# Patient Record
Sex: MALE | Race: WHITE
[De-identification: names, ages, dates, MRNs, and addresses within clinical notes are randomized per-mention and may not be internally consistent; named-entity substitution may affect disease eponyms.]

---

## 2017-04-30 ENCOUNTER — HOSPITAL ENCOUNTER (INPATIENT)
Dept: HOSPITAL 25 - ED | Age: 67
LOS: 4 days | Discharge: NURSING, REHAB OR LONG-TERM CARE | DRG: 65 | End: 2017-05-04
Attending: HOSPITALIST | Admitting: HOSPITALIST
Payer: MEDICARE

## 2017-04-30 DIAGNOSIS — G81.91: ICD-10-CM

## 2017-04-30 DIAGNOSIS — R33.8: ICD-10-CM

## 2017-04-30 DIAGNOSIS — Z79.01: ICD-10-CM

## 2017-04-30 DIAGNOSIS — J90: ICD-10-CM

## 2017-04-30 DIAGNOSIS — Z86.73: ICD-10-CM

## 2017-04-30 DIAGNOSIS — Z79.82: ICD-10-CM

## 2017-04-30 DIAGNOSIS — I65.23: ICD-10-CM

## 2017-04-30 DIAGNOSIS — N18.9: ICD-10-CM

## 2017-04-30 DIAGNOSIS — I50.42: ICD-10-CM

## 2017-04-30 DIAGNOSIS — Z86.14: ICD-10-CM

## 2017-04-30 DIAGNOSIS — Z80.9: ICD-10-CM

## 2017-04-30 DIAGNOSIS — I27.2: ICD-10-CM

## 2017-04-30 DIAGNOSIS — R20.0: ICD-10-CM

## 2017-04-30 DIAGNOSIS — J98.11: ICD-10-CM

## 2017-04-30 DIAGNOSIS — T45.515A: ICD-10-CM

## 2017-04-30 DIAGNOSIS — I48.2: ICD-10-CM

## 2017-04-30 DIAGNOSIS — Z95.810: ICD-10-CM

## 2017-04-30 DIAGNOSIS — N40.1: ICD-10-CM

## 2017-04-30 DIAGNOSIS — I34.0: ICD-10-CM

## 2017-04-30 DIAGNOSIS — N13.4: ICD-10-CM

## 2017-04-30 DIAGNOSIS — N39.0: ICD-10-CM

## 2017-04-30 DIAGNOSIS — R29.704: ICD-10-CM

## 2017-04-30 DIAGNOSIS — R29.810: ICD-10-CM

## 2017-04-30 DIAGNOSIS — R47.81: ICD-10-CM

## 2017-04-30 DIAGNOSIS — N26.1: ICD-10-CM

## 2017-04-30 DIAGNOSIS — N13.30: ICD-10-CM

## 2017-04-30 DIAGNOSIS — I13.0: ICD-10-CM

## 2017-04-30 DIAGNOSIS — K57.90: ICD-10-CM

## 2017-04-30 DIAGNOSIS — B96.1: ICD-10-CM

## 2017-04-30 DIAGNOSIS — I63.9: Primary | ICD-10-CM

## 2017-04-30 DIAGNOSIS — G47.33: ICD-10-CM

## 2017-04-30 DIAGNOSIS — R79.1: ICD-10-CM

## 2017-04-30 DIAGNOSIS — Z91.14: ICD-10-CM

## 2017-04-30 DIAGNOSIS — Z87.891: ICD-10-CM

## 2017-04-30 DIAGNOSIS — E66.2: ICD-10-CM

## 2017-04-30 DIAGNOSIS — B96.4: ICD-10-CM

## 2017-04-30 DIAGNOSIS — D63.1: ICD-10-CM

## 2017-04-30 DIAGNOSIS — M19.90: ICD-10-CM

## 2017-04-30 LAB
ALBUMIN SERPL BCG-MCNC: 3.3 G/DL (ref 3.2–5.2)
ALP SERPL-CCNC: 130 U/L (ref 34–104)
ALT SERPL W P-5'-P-CCNC: 22 U/L (ref 7–52)
ANION GAP SERPL CALC-SCNC: 7 MMOL/L (ref 2–11)
AST SERPL-CCNC: 16 U/L (ref 13–39)
BUN SERPL-MCNC: 86 MG/DL (ref 6–24)
BUN/CREAT SERPL: 15.3 (ref 8–20)
CALCIUM SERPL-MCNC: 8.6 MG/DL (ref 8.6–10.3)
CHLORIDE SERPL-SCNC: 105 MMOL/L (ref 101–111)
CHOLEST SERPL-MCNC: 118 MG/DL
GLOBULIN SER CALC-MCNC: 3.8 G/DL (ref 2–4)
GLUCOSE SERPL-MCNC: 84 MG/DL (ref 70–100)
HCO3 SERPL-SCNC: 27 MMOL/L (ref 22–32)
HCT VFR BLD AUTO: 36 % (ref 42–52)
HDLC SERPL-MCNC: 31.5 MG/DL
HGB BLD-MCNC: 11.5 G/DL (ref 14–18)
MCH RBC QN AUTO: 27 PG (ref 27–31)
MCHC RBC AUTO-ENTMCNC: 32 G/DL (ref 31–36)
MCV RBC AUTO: 85 FL (ref 80–94)
POTASSIUM SERPL-SCNC: 4.8 MMOL/L (ref 3.5–5)
PROT SERPL-MCNC: 7.1 G/DL (ref 6.4–8.9)
RBC # BLD AUTO: 4.23 10^6/UL (ref 4–5.4)
SODIUM SERPL-SCNC: 139 MMOL/L (ref 133–145)
TRIGL SERPL-MCNC: 36 MG/DL
TROPONIN I SERPL-MCNC: 0.01 NG/ML (ref ?–0.04)
WBC # BLD AUTO: 6.7 10^3/UL (ref 3.5–10.8)

## 2017-04-30 PROCEDURE — 81015 MICROSCOPIC EXAM OF URINE: CPT

## 2017-04-30 PROCEDURE — 74176 CT ABD & PELVIS W/O CONTRAST: CPT

## 2017-04-30 PROCEDURE — 93005 ELECTROCARDIOGRAM TRACING: CPT

## 2017-04-30 PROCEDURE — 80048 BASIC METABOLIC PNL TOTAL CA: CPT

## 2017-04-30 PROCEDURE — 85025 COMPLETE CBC W/AUTO DIFF WBC: CPT

## 2017-04-30 PROCEDURE — 85014 HEMATOCRIT: CPT

## 2017-04-30 PROCEDURE — 94660 CPAP INITIATION&MGMT: CPT

## 2017-04-30 PROCEDURE — 84484 ASSAY OF TROPONIN QUANT: CPT

## 2017-04-30 PROCEDURE — 83605 ASSAY OF LACTIC ACID: CPT

## 2017-04-30 PROCEDURE — 85018 HEMOGLOBIN: CPT

## 2017-04-30 PROCEDURE — 86900 BLOOD TYPING SEROLOGIC ABO: CPT

## 2017-04-30 PROCEDURE — 81003 URINALYSIS AUTO W/O SCOPE: CPT

## 2017-04-30 PROCEDURE — 36415 COLL VENOUS BLD VENIPUNCTURE: CPT

## 2017-04-30 PROCEDURE — 86901 BLOOD TYPING SEROLOGIC RH(D): CPT

## 2017-04-30 PROCEDURE — 86850 RBC ANTIBODY SCREEN: CPT

## 2017-04-30 PROCEDURE — 86803 HEPATITIS C AB TEST: CPT

## 2017-04-30 PROCEDURE — 93880 EXTRACRANIAL BILAT STUDY: CPT

## 2017-04-30 PROCEDURE — 71010: CPT

## 2017-04-30 PROCEDURE — 80061 LIPID PANEL: CPT

## 2017-04-30 PROCEDURE — 87186 SC STD MICRODIL/AGAR DIL: CPT

## 2017-04-30 PROCEDURE — 85730 THROMBOPLASTIN TIME PARTIAL: CPT

## 2017-04-30 PROCEDURE — 87641 MR-STAPH DNA AMP PROBE: CPT

## 2017-04-30 PROCEDURE — C8929 TTE W OR WO FOL WCON,DOPPLER: HCPCS

## 2017-04-30 PROCEDURE — 76770 US EXAM ABDO BACK WALL COMP: CPT

## 2017-04-30 PROCEDURE — 70450 CT HEAD/BRAIN W/O DYE: CPT

## 2017-04-30 PROCEDURE — 87086 URINE CULTURE/COLONY COUNT: CPT

## 2017-04-30 PROCEDURE — 85049 AUTOMATED PLATELET COUNT: CPT

## 2017-04-30 PROCEDURE — 85610 PROTHROMBIN TIME: CPT

## 2017-04-30 PROCEDURE — 80053 COMPREHEN METABOLIC PANEL: CPT

## 2017-04-30 PROCEDURE — 82570 ASSAY OF URINE CREATININE: CPT

## 2017-04-30 PROCEDURE — 84300 ASSAY OF URINE SODIUM: CPT

## 2017-04-30 PROCEDURE — 93306 TTE W/DOPPLER COMPLETE: CPT

## 2017-04-30 PROCEDURE — G0378 HOSPITAL OBSERVATION PER HR: HCPCS

## 2017-04-30 PROCEDURE — 94760 N-INVAS EAR/PLS OXIMETRY 1: CPT

## 2017-04-30 PROCEDURE — 94640 AIRWAY INHALATION TREATMENT: CPT

## 2017-04-30 PROCEDURE — 87077 CULTURE AEROBIC IDENTIFY: CPT

## 2017-04-30 RX ADMIN — ASPIRIN SCH MG: 81 TABLET, COATED ORAL at 15:56

## 2017-04-30 RX ADMIN — MOMETASONE FUROATE AND FORMOTEROL FUMARATE DIHYDRATE SCH PUFF: 200; 5 AEROSOL RESPIRATORY (INHALATION) at 20:24

## 2017-04-30 RX ADMIN — IPRATROPIUM BROMIDE AND ALBUTEROL SULFATE SCH NEB: .5; 3 SOLUTION RESPIRATORY (INHALATION) at 20:21

## 2017-04-30 RX ADMIN — TAMSULOSIN HYDROCHLORIDE SCH MG: 0.4 CAPSULE ORAL at 22:41

## 2017-04-30 NOTE — RAD
Indication: CVA. History of cardiac and respiratory disease. Morbid obesity.



Comparison: November 17, 2016 chest radiograph and July 22, 2016 CT.



Technique: Semiupright AP chest 0912 hours



Report: Leftward rotation. Cardiomegaly. Moderate LEFT pleural effusion and significant

atelectasis of the LEFT lung similar to the prior exams. Moderate prominence of

interstitial markings in the RIGHT lung without change. No pneumothorax evident. Prominent

ill-defined central pulmonary vasculature.



IMPRESSION: Interstitial pulmonary edema not excluded. Chronic LEFT pleural effusion and

significant atelectasis of the LEFT lung.

## 2017-04-30 NOTE — RAD
Indication: Acute RIGHT hemiplegia.



Comparison: November 18, 2016 CT



Technique: Noncontrast CT vertex of skull through foramen magnum.



Report: Obliquity and motion artifact degrades image quality. Mild prominence of the

cerebral sulci reflecting atrophy. Unremarkable ventricles and basal cisterns. Negative

for gray matter white matter obscuration, intra or extra-axial hemorrhage, or mass effect.

Unremarkable orbital contents. No suspicious calvarial or skull base lesion. 



Mucous retention cyst or polyp at the floor of the LEFT maxillary sinus. Negative for

paranasal sinus fluid levels. Partial opacification of the mastoid air spaces. Cerumen in

the external auditory canals. Negative for scalp hematoma.



IMPRESSION: No evidence for intracranial hemorrhage or gross stigmata of ischemic infarct.

Mild involutional change.



Results discussed with Dr. Maurice 4/30/2017 9:32 AM EDT

## 2017-04-30 NOTE — RAD
INDICATION: RIGHT flank pain, hematuria. Morbid obesity.



COMPARISON:  October 24, 2013 CT.



TECHNIQUE: Multidetector CT images were obtained from the lung bases to the ischial

tuberosities. Evaluation of the viscera is limited without IV contrast. Multiplanar

reformation.



REPORT: Morbid obesity limits image quality. Cardiomegaly with associated partial

atelectasis of the LEFT lung without change.



Small calcified granuloma at the unenhanced liver. The gallbladder is not visualized.

Moderately severe fatty replacement of the pancreas. Unremarkable spleen.



No CT abnormality of the upper GI or small bowel. Unremarkable infra cecal appendix. Mild

colonic diverticulosis without findings of diverticulitis. Negative for ascites or free

air. Laxity of the ventral abdominal wall. No discrete hernia evident.



Normal adrenal glands. Postsurgical change of probable prostatectomy. Mildly prominent

urinary bladder wall and surrounding fat reticulation/inflammation. Severe LEFT and

moderate RIGHT hydroureter. Moderate RIGHT and moderately severe LEFT renal cortical

atrophy. Negative for dilatation of the calyces. No obstructing ureteral stones. A few

punctate nonobstructing LEFT renal stones. Coarse calcification at the normal volume

prostate. Unremarkable seminal vesicles.



Negative for lymphadenopathy. Mild peripheral atherosclerotic plaque without aneurysm of

the abdominal aorta or iliac arteries. Physiologic distention of the IVC.



No significant change in ring and arc pattern matrix calcification lesion at the RIGHT

femoral neck to intratrochanteric region compared with the October 24, 2013 CT consistent

with a benign enchondroma. Negative for suspicious focal osseous lesions. Negative for

fracture. Lumbar sacral spine degenerative spondylosis and facet joint osteoarthritis.



IMPRESSION: 

1.Cardiomegaly with associated partial atelectasis of the LEFT lung without change.

2. Mild colonic diverticulosis without findings of diverticulitis. Normal appendix

documented.

3. Mild bladder wall thickening and reticulation of the fat surrounding the urinary

bladder; correlate for potential cystitis. And bilateral hydroureter without dilatation of

the renal calyces. Moderate RIGHT and moderately severe LEFT renal cortical atrophy.

## 2017-04-30 NOTE — HP
HISTORY AND PHYSICAL:

 

DATE OF ADMISSION:  04/30/17

 

CHIEF COMPLAINT:  Right-sided weakness.

 

HISTORY OF PRESENT ILLNESS:  The patient is a 67-year-old gentleman who resides 
at [facility], who said last night his whole right side 
felt swollen.  Then, this morning when he woke up at 7:30, he could not lift up 
his right leg or his right arm.  He thinks he had some slurred speech as well.  
He denied any facial droop, but he said he had some trouble finding words.  
Currently, he said the symptoms have resolved.  He had no associated headache 
or blurry vision.  He does ambulate with a walker, but says he did not notice 
any difference in ambulation.  He had no chest pain, no shortness of breath, 
and no palpitations. He said he noticed a week ago, he has an episode where he 
felt like his right arm was superglued to his chest.  He also notes that his 
blood pressure normally runs low, but today it was 148/70, which is actually 
quite high for him.  He had a workup for slurred speech back this November in 
2016.

 

PAST MEDICAL HISTORY:  Significant for obstructive sleep apnea with 
noncompliance, cerebrovascular accident, atrial fibrillation, hypertension, MRSA
, BPH, morbid obesity, history of congestive heart failure.

 

PAST SURGICAL HISTORY:  Significant for defibrillator/pacemaker placement and 
hernia repair.

 

CURRENT MEDICATIONS:  Are as follows:

1.  Dulera 200/5 two puffs inhaled twice daily.

2.  Furosemide 40 mg twice daily.

3.  Ventolin nebulizer 2.5 mg inhaled 3 times a day.

4.  Atrovent nebulizer 0.5 mg 3 times a day.

5.  Cholecalciferol 2000 units daily.

6.  Flomax 0.4 mg at bedtime.

7.  Coumadin 3.5 mg daily.

8.  Tylenol 650 mg every 6 hours as needed.

 

ALLERGIES:  He has no known drug allergies.

 

FAMILY HISTORY:  Mother and father had colon cancer.  Father had lung cancer.

 

SOCIAL HISTORY:  Ex-tobacco, quit _____.  Surrogate decision maker is his 
brother, Nicolas Culver.

 

REVIEW OF SYSTEMS:  A 14-point review of systems was completed with the 
patient. All pertinent positives and negatives are in the history of present 
illness, otherwise it is negative.

 

                               PHYSICAL EXAMINATION

 

GENERAL:  Pleasant gentleman, lying in bed, in no acute distress.

 

VITAL SIGNS:  Temperature 97.9 degrees, heart rate 70 beats per minute, 
respiratory rate 20 breaths per minute, pulse ox 97%, blood pressure 125/45.

 

HEENT:  Normocephalic, atraumatic.  Pupils are equal, round, and reactive to 
light. Moist mucous membranes.

 

NECK:  Supple.  No JVD, bruits, palpable thyroid, or lymphadenopathy.

 

CHEST:  Clear to auscultation and percussion bilaterally.

 

CARDIOVASCULAR:  S1, S2 appreciated.

 

ABDOMEN:  Morbidly obese.  Positive bowel sounds in all 4 quadrants except some 
erythema on his left side of his abdomen, but no warmth or tenderness.

 

EXTREMITIES:  No cyanosis or clubbing.  He has got bilateral edema.

 

NEURO:  Alert and oriented x3.  Moves all extremities.

 

SKIN:  Other than the aforementioned erythema, no other distinct rashes or 
abnormalities.

 

 DIAGNOSTIC STUDIES/LAB DATA:  White count 6.7, hemoglobin 11.5, hematocrit 36, 
his platelets are 223.  Sodium is 139, potassium 4.8, chloride 105, CO2 27, BUN 
86, creatinine 5.61, glucose 84.  His INR is 3.26.  Urinalysis has +1 leukocyte 
esterase.

 

Brain CT was interpreted by Radiology as no evidence for intracranial 
hemorrhage or gross stigmata of ischemic infarct, mild involutional change.

 

Chest x-ray was interpreted by Radiology as interstitial pulmonary edema not 
excluded.  Chronic left pleural effusion and significant atelectasis of the 
left lung.

 

Abdominal pelvic CT:  Cardiomegaly with associated partial atelectasis of the 
left lung without change.  Mild colonic diverticulosis without findings of 
diverticulitis.  Normal appendix documented.  Mild bladder wall thickening and 
reticulation of the fat surrounding the urinary bladder _____ cystitis and 
bilateral hydroureter without dilatation of the renal calyces.  Moderate right 
and severe left renal cortical atrophy.

 

EKG shows V-paced rhythm.

 

ASSESSMENT AND PLAN:

1.  Transient ischemic attack.  It is possible from his symptoms.  I did notice 
on the neurologic exam, he may have a slight pronator drift, but otherwise was 
intact. I will order an MRI of his head and neck and a transthoracic 
echocardiogram with bubble study and do neurological checks q.4 hours.  He is 
already on Coumadin and he is actually supratherapeutic, but I will add an 
aspirin a day to his regimen.  I will check a lipid profile in the morning.  I 
will hold off on a neurological evaluation unless he rules out for a 
cerebrovascular accident.

2.  Acute kidney injury.  Creatinine is normally elevated, but this is 
unusually high.  He seems to be dehydrated.  We will hold his furosemide and 
place him on IV fluids and recheck in the a.m.

3.  Atrial fibrillation.  Hold Coumadin with an elevated INR.  He has a 
pacemaker and his heart rate is adequately controlled.

4.  Chronic obstructive pulmonary disease.  Continue inhaler.

5.  Hydroureter, etc., on CT.  The patient should follow up with Urology.  Does 
not need to be seen by them through this admission.

6.  Benign prostatic hyperplasia.  Continue Flomax.

7.  FEN.  N.p.o. awaiting swallow evaluation.

8.  DVT prophylaxis.  He is on Coumadin.

9.  The patient is a full code.

 

TIME SPENT:  Over 80 minutes was spent on this H and P, more than 45 minutes of 
which was spent in direct face-to-face contact with the patient in evaluation, 
physical exam, counseling, and coordination of care.

 

CC:  Dr. Cherry* 

 

[id number]/CPS #: [id number]

NICOLAS

## 2017-05-01 LAB
ANION GAP SERPL CALC-SCNC: 9 MMOL/L (ref 2–11)
BUN SERPL-MCNC: 88 MG/DL (ref 6–24)
BUN/CREAT SERPL: 15.4 (ref 8–20)
CALCIUM SERPL-MCNC: 8.3 MG/DL (ref 8.6–10.3)
CHLORIDE SERPL-SCNC: 106 MMOL/L (ref 101–111)
CHOLEST SERPL-MCNC: 98 MG/DL
GLUCOSE SERPL-MCNC: 96 MG/DL (ref 70–100)
HCO3 SERPL-SCNC: 25 MMOL/L (ref 22–32)
HDLC SERPL-MCNC: 24.5 MG/DL
POTASSIUM SERPL-SCNC: 4.3 MMOL/L (ref 3.5–5)
SODIUM SERPL-SCNC: 140 MMOL/L (ref 133–145)
TRIGL SERPL-MCNC: 51 MG/DL

## 2017-05-01 PROCEDURE — 5A09357 ASSISTANCE WITH RESPIRATORY VENTILATION, LESS THAN 24 CONSECUTIVE HOURS, CONTINUOUS POSITIVE AIRWAY PRESSURE: ICD-10-PCS | Performed by: HOSPITALIST

## 2017-05-01 RX ADMIN — IPRATROPIUM BROMIDE AND ALBUTEROL SULFATE SCH NEB: .5; 3 SOLUTION RESPIRATORY (INHALATION) at 20:12

## 2017-05-01 RX ADMIN — SODIUM CHLORIDE SCH MLS/HR: 900 IRRIGANT IRRIGATION at 21:11

## 2017-05-01 RX ADMIN — IPRATROPIUM BROMIDE AND ALBUTEROL SULFATE SCH NEB: .5; 3 SOLUTION RESPIRATORY (INHALATION) at 08:59

## 2017-05-01 RX ADMIN — ASPIRIN SCH MG: 81 TABLET, COATED ORAL at 08:53

## 2017-05-01 RX ADMIN — MOMETASONE FUROATE AND FORMOTEROL FUMARATE DIHYDRATE SCH: 200; 5 AEROSOL RESPIRATORY (INHALATION) at 20:17

## 2017-05-01 RX ADMIN — TAMSULOSIN HYDROCHLORIDE SCH MG: 0.4 CAPSULE ORAL at 21:08

## 2017-05-01 RX ADMIN — MOMETASONE FUROATE AND FORMOTEROL FUMARATE DIHYDRATE SCH PUFF: 200; 5 AEROSOL RESPIRATORY (INHALATION) at 08:59

## 2017-05-01 RX ADMIN — IPRATROPIUM BROMIDE AND ALBUTEROL SULFATE SCH NEB: .5; 3 SOLUTION RESPIRATORY (INHALATION) at 13:20

## 2017-05-01 RX ADMIN — Medication SCH UNITS: at 08:52

## 2017-05-01 RX ADMIN — SODIUM CHLORIDE SCH MLS/HR: 900 IRRIGANT IRRIGATION at 11:09

## 2017-05-01 NOTE — RAD
HISTORY: Stroke workup



COMPARISONS: November 18, 2016



The study is limited by patient body habitus.





TECHNIQUE: Multiple transverse and longitudinal ultrasound images were obtained of the

carotid and vertebral arteries bilaterally, using grayscale, color Doppler, and spectral

Doppler imaging.



FINDINGS:



Measurement of carotid stenosis is based on flow velocity parameters that correlate the

residual internal carotid artery diameter with [employer] Symptomatic Carotid

Endarterectomy Trial (NASCET)-based stenosis levels.



RIGHT:

Intima: There is diffuse intimal thickening.



Velocities:

Right internal carotid artery maximum peak systolic velocity: 148 cm/s

Right common carotid artery maximum peak systolic velocity: 138 cm/s

Right internal carotid artery/common carotid artery ratio: 1.6



Waveforms: There is no spectral broadening.



Right Vertebral: The right vertebral artery flow is antegrade.



LEFT:

Intima: There is diffuse intimal thickening.



Velocities:

Left internal carotid artery maximum peak systolic velocity: 139 cm/s

Left common carotid artery maximum peak systolic velocity: 161 cm/s

Left internal carotid artery/common carotid artery ratio: 1



Waveforms: There is no spectral broadening.



Left Vertebral: The left vertebral artery flow is antegrade.



OTHER FINDINGS: None.



IMPRESSION: 

1.  LIMITED STUDY.

2.  MILDLY ELEVATED PEAK SYSTOLIC VELOCITIES OF THE INTERNAL CAROTID ARTERIES BILATERALLY,

CONSISTENT WITH BILATERAL INTERNAL CAROTID ARTERY STENOSIS BETWEEN 50% AND 69% BY NASCET

CRITERIA.

3.  GIVEN THE LIMITATIONS OF STUDY, AND THE FINDINGS OF BILATERAL INTERNAL CAROTID ARTERY

STENOSIS, CONSIDER CORRELATION WITH CROSS SECTIONAL IMAGING, INCLUDING CT ANGIOGRAPHY OR

MR ANGIOGRAPHY OF THE NECK



CPT II Codes: 3100F

## 2017-05-01 NOTE — PN
Subjective


Date of Service: 05/01/17


Interval History: 





Patient seen this morning. Says he has had recurrent symptoms over the past 

week or two. Says he previously had RUE weakness and could not move his arm for 

1-2 minutes, this occurred 1.5 weeks ago. His current symptoms began two nights 

ago, feels that he is not back to baseline, still feels like he is having 

persistent weakness, worse than his baseline from prior CVA. Reports non-

compliance with his nightly BiPAP, was not wearing it the night his symptoms 

began


Family History: Unchanged from Admission


Social History: Unchanged from Admission


Past Medical History: Unchanged from Admission





Objective


Active Medications: 








Albuterol/Ipratropium (Duoneb (Albuterol 2.5 Mg/Ipratropium 0.5 Mg))  1 neb INH 

TID TISHA


Aspirin (Aspirin Ec Low Dose*)  81 mg PO DAILY TISHA


Cholecalciferol (Vitamin D Tab*)  2,000 units PO DAILY TISHA


Sodium Chloride (Ns 0.9% 1000 Ml*)  1,000 mls @ 100 mls/hr IV PER RATE TISHA


Mometasone Furoate/Formoterol Fumar (Dulera 200/5 Mdi*)  2 puff INH BID TISHA


Morphine Sulfate (Morphine Inj (Syringe)*)  4 mg IV Q4H PRN


Pharmacy Profile Note (Coumadin Daily Reminder*)  0 note FOLLOW UP 1700 TISHA


Tamsulosin HCl (Flomax Cap*)  0.4 mg PO BEDTIME TISHA





 Vital Signs











  04/30/17 04/30/17 04/30/17





  11:30 13:14 16:11


 


Temperature 97.9 F 97.5 F 97.7 F


 


Pulse Rate 70 68 69


 


Respiratory 20 26 26





Rate   


 


Blood Pressure 125/45 131/62 122/55





(mmHg)   


 


O2 Sat by Pulse 97 93 95





Oximetry   














  04/30/17 04/30/17 05/01/17





  19:33 20:25 00:20


 


Temperature 98.6 F  97.8 F


 


Pulse Rate 71 72 72


 


Respiratory 22  20





Rate   


 


Blood Pressure 141/64  114/56





(mmHg)   


 


O2 Sat by Pulse 91 98 92





Oximetry   














  05/01/17 05/01/17 05/01/17





  04:28 07:48 08:00


 


Temperature 97.4 F 97.5 F 


 


Pulse Rate 70 70 


 


Respiratory 28 24 





Rate   


 


Blood Pressure 104/49 116/53 





(mmHg)   


 


O2 Sat by Pulse 96 90 90





Oximetry   














  05/01/17





  09:34


 


Temperature 


 


Pulse Rate 79


 


Respiratory 18





Rate 


 


Blood Pressure 





(mmHg) 


 


O2 Sat by Pulse 98





Oximetry 











Oxygen Devices in Use Now: Nasal Cannula


Appearance: Super-morbidly obese,  M, laying in bed in NAD


Eyes: No Scleral Icterus


Ears/Nose/Mouth/Throat: Mucous Membranes Moist


Neck: NL Appearance and Movements; NL JVP


Respiratory: Symmetrical Chest Expansion and Respiratory Effort, Clear to 

Auscultation


Cardiovascular: NL Sounds; No Murmurs; No JVD, - - IRIR


Abdominal: - - Obese, soft, NTND, BS+


Lymphatic: No Cervical Adenopathy


Extremities: No Edema


Skin: No Rash or Ulcers


Neurological: Alert and Oriented x 3, - - 4/5 strength in RLE, 4+/5 strength in 

RUE, diminished sensation on R side 


Result Diagrams: 


 04/30/17 09:20





 05/01/17 05:34


Microbiology and Other Data: 


 Microbiology











 04/30/17 13:00 Nasal Screen MRSA (PCR)([facility]) - Final





 Nasal    Mrsa Negative














Assess/Plan/Problems-Billing


Assessment: 


R sided weakness, SHAWNA on CKD, hydroureter in a 68 yo M with hx of CVA, AFib on 

coumadin, KIM, BPH, chronic systolic CHF (EF 35-40%)








- Patient Problems


(1) Weakness


Current Visit: Yes   Comment: R sided weakness. ?TIA/CVA. Has had these 

symptoms in the past with other medical problems, ?related to renal failure. 

MRI unable to be done, CTA contraindicated with renal failure. Will get carotid 

US. Echo pending. Continue ASA for now. LDL at goal. Coumadin supratherapeutic. 

Will ask Neurology to evaluate.    





(2) SHAWNA (acute kidney injury)


Current Visit: Yes   Comment: on CKD. BUN/Creatinine elevated, ?due to 

malfunctioning alexandre which has been replaced. Hydroureter noted on CT scan. 

Patient did not get IVF overnight, will start now and monitor renal function. 

Get renal US. Continue flomax for BPH. Holding Lasix.   





(3) Atrial fibrillation


Current Visit: No   Comment: 


Heart rate adequately controlled.  


Chronic AF -- previous cardioembolic stroke -- 11/15/2016


Coumadin is supratherapeutic, holding for now. Monitor INR


   





(4) KIM (obstructive sleep apnea)


Current Visit: No   Comment: BiPAP 16/8 when asleep, needs to be more compliant 

at [facility] with this therapy   





(5) DVT prophylaxis


Current Visit: No   Comment: On coumadin

## 2017-05-01 NOTE — RAD
INDICATION: Hydroureter, follow-up.



COMPARISON:  Comparison is made with a prior CT of the abdomen and pelvis from April 30, 2017.



TECHNIQUE:  Multiple real-time images of the kidneys and urinary bladder were obtained.

The exam is limited due to the patient's body habitus.



FINDINGS: The left kidney was not visualized. The right kidney measured 10.8 x 5.4 x 5.6

cm. No focal abnormality or hydronephrosis is seen.



The urinary bladder was not visualized. 



IMPRESSION:  LIMITED STUDY, THE LEFT KIDNEY AND URINARY BLADDER WERE NOT VISUALIZED. THE

RIGHT KIDNEY APPEARS TO BE WITHIN NORMAL LIMITS.

## 2017-05-01 NOTE — CONS
NEUROLOGY CONSULTATION:

 

DATE OF CONSULTATION:  05/01/17

 

LOCATION:  He is an inpatient in room 438.

 

REFERRING PHYSICIAN:  Dr. Ottoniel Coy.

 

CHIEF COMPLAINT:  Right-sided weakness.

 

HISTORY OF PRESENT ILLNESS:  Puneet Culver is a 67-year-old right-handed man who 
presented the day of admission with recurrent right-sided weakness.  He says 
the night before, he had some weakness in his right side and then the next 
morning when he woke up, he could not raise his right arm and leg.  He has had 
at least 2 if not multiple episodes of transient right-sided weakness, which 
would last days to a week.  He has had negative CAT scans including the last 
night when he came in.  He does not have any difficulty with language, but 
feels his right arm and leg are weak and numb.  He does not notice any change 
in his speech.  He is on chronic anticoagulation for chronic atrial 
fibrillation and also has a pacemaker and defibrillator.  His INR at 
presentation yesterday was 3.26.  He has a history of hypertension, coronary 
artery disease, and severe obesity alveolar hypoventilation syndrome.

 

PAST MEDICAL HISTORY:  Notable for hospitalization for over a month in [place]
, what sounds like aspiration pneumonia after surgery for defibrillator and 
pacer, sleep apnea with noncompliance, morbid obesity, history of congestive 
heart failure.

 

MEDICATIONS:  At [facility] where he was when he came in are:

1.  Furosemide 40 mg p.o. b.i.d.

2.  Dulera 2 puffs b.i.d.

3.  Ventolin nebulizer 2.5 mg t.i.d.

4.  Atrovent nebulizer 0.5 mg t.i.d.

5.  Flomax 0.4 mg p.o. daily.

6.  Coumadin 3.5 mg p.o. daily.

 

ALLERGIES:  He does not have any drug allergies.

 

REVIEW OF SYSTEMS:  Notable for severe leg and back pain limiting mobility.  He 
says he uses a walker and can walk about the length of his hospital room and 
after that has to use a wheelchair.  This apparently is chronic.  He denies 
headaches. He had an episode of double vision late last year.  He was having 
chest pain last year doing physical therapy, but nothing recently.  No recent 
faints.  No history of seizures.  In spite of his obesity, there is no history 
of diabetes.  No recent unusual stressors.  He denies abdominal problems.

 

PHYSICAL EXAMINATION:  He is morbidly obese.  Temperature 98.6 temporarily, 
blood pressure 127/72, heart rate 70s and regular, respiratory rate 24.  Oxygen 
saturation is 98% on 4 L per nasal canula.  Lung reveals diffuse wheezes.  Head 
is atraumatic.  Heart tones are very distant.  I do not hear murmurs.  The 
carotid pulses are not felt either, but I do not hear any bruits over loud 
breath sound. Oral mucosa is moist, dentition is poor, there is no oral trauma.

 

Neurologically, pupils react equally from 3.5 to 2 mm.  Funduscopic exam 
reveals sharp discs and silver wiring.  Eye movements and visual fields are 
normal.  There is no ptosis.  Facial musculature is symmetric.  Facial 
sensation to pin and light touch is reported as diminished on the right side.  
Speech is mildly dysarthric. Hearing is intact.

 

Motor exam reveals fairly good strength on the left arm, grade 4 left hip 
flexor weakness.  Also has great difficulty positioning in bed due to his 
severe obesity. He raises the right leg stiffly off the bed and holds it about 
5 inches off the bed and says he cannot lift it any higher.  He is able to show 
pretty good strength in the upper extremities bilaterally, but has a lot of 
myoclonus and tremor.

 

Finger taps are clumsy bilaterally.  He has bilateral upper extremity myoclonus.

 

LABORATORY DATA:  Includes a CT of the brain, which reveals a lot of artifact, 
but no evidence of acute or chronic infarctions.  Carotid Doppler study reveals 
bilateral atherosclerotic disease at the carotid bifurcations with estimations 
of 50% to 69% internal carotid stenosis bilaterally.  Laboratory data is 
notable for CBC notable for hemoglobin of 11.5 and otherwise unremarkable.  
Guaiacs are notable for INR of 3.77 this morning, it was 3.26 yesterday when he 
presented in the emergency room.  Chemistry is notable for elevation in his 
creatinine up to 5.72, it was last 3.43 on 04/17/17 and so this was significant 
increase.  BUN is also elevated at 88.

 

IMPRESSION:  Is that of a possible left hemisphere stroke.  However, his exam 
looks somewhat functional.  He clearly has severe metabolic problems in terms 
of his pulmonary encephalopathy and major vascular risk factors.  However, he 
is already fully anticoagulated and he does not have carotid disease that would 
benefit from surgery.  Aspirin has been added to his Coumadin, which I think is 
reasonable at least for the short term.  Hopefully, he will just resolve 
spontaneously and the actual diagnosis will be conversion disorder and I do not 
recommend any other particular antiplatelet or medical interventions at this 
point in time as he fairly maxed out in that regard.

 

I will follow him along with you.

 

 [id number]/CPS #: [id number]

NICOLAS

## 2017-05-01 NOTE — ECHO
Patient:      YOLANDA WEAVER

[facility] Rec#:     [id number]            :          1950          

Date:         2017            Age:          67y                 

Account#:     [id number]          Height:       187.96 cm / 74.0 in

Accession#:   [id number]           Weight:       198.67 kg / 437.9 lbs

Sex:          M                     BSA:          3.03

Room#:        439                   

Admit Date#:  2017          

Type:         Inpatient

 

Referring:    Scott Mckee MD

Reading:      Liban Ly MD

CC:           Qutaybeh Maghaydah, MD

______________________________________________________________________

 

Transthoracic Echocardiogram

 

Indication:

TIA

BP:           104/49

HR:           82

Rhythm:       Paced

 

Findings     

History:

Morbid obesity,KIM with noncompliance,prior CVA,a-fib,s/p AICD ,CHF. 

 

Technical Comments:

The study is technically limited due to patient body habitus.  The study

was technically limited due to the patient's inability to lay in the

left lateral decubitus position.  

 

Left Ventricle:

The left ventricle is not well visualized. There is diffuse global

hypokinesis of the left ventricle. There is mildly decreased left

ventricular systolic function. The estimated ejection fraction is

45-50%.  

 

Left Atrium:

The left atrium is not well visualized. 

 

Right Ventricle:

A pacemaker wire is visualized in the right ventricle. 

 

Right Atrium:

The right atrium is not well visualized.  Transesophageal echo of 2016

included an agiatated saline study which was negative. A pacemaker wire

is visualized in the right atrium. 

 

Aortic Valve:

The aortic valve structure is not well visualized. There is no evidence

of aortic regurgitation. There is no evidence of aortic stenosis. 

 

Mitral Valve:

The mitral valve leaflets are mildly thickened. There is moderate to

severe mitral regurgitation.  The mitral regurgitant jet is laterally

directed. There is no evidence of mitral stenosis. 

 

Tricuspid Valve:

The tricuspid valve structure is not well visualized. There is mild to

moderate tricuspid regurgitation. There is evidence of moderate to

severe pulmonary hypertension. There is no tricuspid stenosis. 

 

Pulmonic Valve:

The pulmonic valve structure is not well visualized. 

 

Pericardium:

A pericardial fat pad is visualized. 

 

Aorta:

There is moderate dilatation of the ascending aorta. There is moderate

dilatation of the aortic arch. There is moderate dilatation of the

aortic root. 

 

Pulmonary Artery:

The main pulmonary artery is not well visualized. 

 

Venous:

The venous system is not well visualized. 

 

Contrast:

Definity was used to optimize study.   6 ml. Definity used for

endocardial border definition. Intravenous contrast was used to enhance

endocardial border definition. 

 

Conclusions

The study is technically limited due to patient body habitus. 

There is diffuse global hypokinesis of the left ventricle.

There is mildly decreased left ventricular systolic function.

The estimated ejection fraction is 45-50%. 

A pacemaker wire is visualized in the right atrium.

The right atrium is not well visualized.  Transesophageal echo of 2016

included an agiatated saline study which was negative.

There is no evidence of aortic regurgitation.

There is moderate to severe mitral regurgitation. 

The mitral regurgitant jet is laterally directed.

There is mild to moderate tricuspid regurgitation.

There is mild to moderate tricuspid regurgitation.

There is evidence of moderate to severe pulmonary hypertension.

There is moderate dilatation of the ascending aorta.

 

Measurements     

Name                    Value         Normal Range            

Aortic Annulus          2.7 cm        (1.4 - 2.6)             

Ao root diameter (2D)   4 cm          (2.1 - 3.5)             

Ascending Ao            4.2 cm        (2.1 - 3.4)             

Aortic arch             4 cm          (1.8 - 3.4)             

 

Name                    Value         Normal Range            

MV E-wave Vmax          1.3 m/sec     -                        

MV deceleration time    168 msec      -                        

MV E:A ratio            1 ratio       -                        

 

Name                    Value         Normal Range            

AV Vmax                 1.4 m/sec     -                        

AV VTI                  35.9 cm       -                        

AV peak gradient        7.97 mmHg     -                        

AV mean gradient        3.81 mmHg     -                        

LVOT Vmax               1 m/sec       -                        

LVOT VTI                16.8 cm       -                        

LVOT peak gradient      3.85 mmHg     -                        

LVOT mean gradient      1.77 mmHg     -                        

 

Name                    Value         Normal Range            

MR Vmax                 5.4 m/sec     -                        

MR VTI                  160 cm        -                        

 

Name                    Value         Normal Range            

TR Vmax                 3.6 m/sec     -                        

TR peak gradient        52 mmHg       -                        

RAP                     8 mmHg        -                        

RVSP                    60 mmHg       -                        

 

Electronically signed by: Liban Ly MD on 2017 16:30:35

## 2017-05-02 LAB
ANION GAP SERPL CALC-SCNC: (no result) MMOL/L (ref 2–11)
BUN SERPL-MCNC: 87 MG/DL (ref 6–24)
BUN/CREAT SERPL: 16.3 (ref 8–20)
CALCIUM SERPL-MCNC: 8.3 MG/DL (ref 8.6–10.3)
CHLORIDE SERPL-SCNC: 104 MMOL/L (ref 101–111)
GLUCOSE SERPL-MCNC: 91 MG/DL (ref 70–100)
HCO3 SERPL-SCNC: 25 MMOL/L (ref 22–32)
POTASSIUM SERPL-SCNC: (no result) MMOL/L (ref 3.5–5)
SODIUM SERPL-SCNC: 135 MMOL/L (ref 133–145)
SODIUM UR-SCNC: 30 MMOL/L

## 2017-05-02 RX ADMIN — IPRATROPIUM BROMIDE AND ALBUTEROL SULFATE SCH NEB: .5; 3 SOLUTION RESPIRATORY (INHALATION) at 14:01

## 2017-05-02 RX ADMIN — IPRATROPIUM BROMIDE AND ALBUTEROL SULFATE SCH NEB: .5; 3 SOLUTION RESPIRATORY (INHALATION) at 09:24

## 2017-05-02 RX ADMIN — ASPIRIN SCH MG: 81 TABLET, COATED ORAL at 09:20

## 2017-05-02 RX ADMIN — SODIUM CHLORIDE SCH MLS/HR: 900 IRRIGANT IRRIGATION at 09:22

## 2017-05-02 RX ADMIN — CEFTRIAXONE SODIUM SCH MLS/HR: 1 INJECTION, POWDER, FOR SOLUTION INTRAMUSCULAR; INTRAVENOUS at 09:28

## 2017-05-02 RX ADMIN — TAMSULOSIN HYDROCHLORIDE SCH MG: 0.4 CAPSULE ORAL at 20:53

## 2017-05-02 RX ADMIN — IPRATROPIUM BROMIDE AND ALBUTEROL SULFATE SCH NEB: .5; 3 SOLUTION RESPIRATORY (INHALATION) at 20:22

## 2017-05-02 RX ADMIN — MOMETASONE FUROATE AND FORMOTEROL FUMARATE DIHYDRATE SCH PUFF: 200; 5 AEROSOL RESPIRATORY (INHALATION) at 20:22

## 2017-05-02 RX ADMIN — Medication SCH UNITS: at 09:20

## 2017-05-02 RX ADMIN — SODIUM CHLORIDE SCH MLS/HR: 900 IRRIGANT IRRIGATION at 21:17

## 2017-05-02 RX ADMIN — MOMETASONE FUROATE AND FORMOTEROL FUMARATE DIHYDRATE SCH PUFF: 200; 5 AEROSOL RESPIRATORY (INHALATION) at 09:24

## 2017-05-02 NOTE — CONS
NEUROLOGY FOLLOWUP NOTE:

 

DATE OF FOLLOWUP:  05/02/17

 

LOCATION:  He is an inpatient in room 439.

 

HOSPITALIST:  Dr. Ottoniel Coy.

 

CHIEF COMPLAINT:  Right-sided weakness.

 

INTERVAL HISTORY:  Since yesterday, Mr. Culver feels there has been some 
improvement.  He can lift his right leg up better.  His right arm is still heavy
, but he can move it better as well.  He has not noticed any new symptoms.  No 
headache or facial numbness or difficulty with speech.

 

MEDICATIONS:  Reviewed and he remains on Coumadin, but it is being held as his 
INR remains elevated.  He is on:

1.  Flomax 0.4 mg p.o. q.h.s.

2.  Ceftriaxone 1000 mg IV q.24 hours.

3.  Aspirin 81 mg p.o. q. day.

 

PHYSICAL EXAM:  He is morbidly obese.  Temperature 97.6 orally, blood pressure 
118/59, heart rate 70.

 

Neurologic Exam:  Facial musculature is symmetric. Eye movements are normal and 
visual fields are full to confrontation.

 

He has variable strength in the right upper extremity with at least resistive 
strength in all muscle groups.  He has a downward drift of the right arm 
without pronation.  He can move the right leg up off the bed and offers 
variable resistance.  He has bilateral myoclonus and asterixes in the limbs.

 

LABORATORY DATA:  Reviewed and his chemistries today notable for creatinine 
remaining elevated at 5.33, down slightly from yesterday when it was 5.72.  BUN 
remains elevated at 87; it was 88 yesterday. 



IMPRESSION:  Possible left hemisphere stroke.  He shows some improvement.  He 
is already anticoagulated and does not have surgical carotid disease.  His 
lipid profile is excellent.  His blood pressure control is acceptable.  I do 
not recommend any changes at this point, which is continued physical therapies 
and re- anticoagulating him once his INR gets under 3.

 

 [id number]/[facility] #: [id number]

[facility] CenterBRANDI

## 2017-05-02 NOTE — PN
Subjective


Date of Service: 05/02/17


Interval History: 





Patient seen this morning. Feels maybe his RLE is a bit stronger. Says he did 

have some urinary retention in days leading up to admission in addition to some 

blood in the urine and what he reports was positive UCx. Used home BiPAP 

overnight. 


Family History: Unchanged from Admission


Social History: Unchanged from Admission


Past Medical History: Unchanged from Admission





Objective


Active Medications: 








Acetaminophen (Tylenol Tab*)  650 mg PO Q6H PRN


Albuterol/Ipratropium (Duoneb (Albuterol 2.5 Mg/Ipratropium 0.5 Mg))  1 neb INH 

TID TISHA


Aspirin (Aspirin Ec Low Dose*)  81 mg PO DAILY TISHA


Cholecalciferol (Vitamin D Tab*)  2,000 units PO DAILY TISHA


Sodium Chloride (Ns 0.9% 1000 Ml*)  1,000 mls @ 100 mls/hr IV PER RATE TISHA


Ceftriaxone Sodium 1,000 mg/ (Sodium Chloride)  50 mls @ 200 mls/hr IVPB Q24H 

[facility]


Mometasone Furoate/Formoterol Fumar (Dulera 200/5 Mdi*)  2 puff INH BID TISHA


Pharmacy Profile Note (Coumadin Daily Reminder*)  0 note FOLLOW UP 1700 [facility]


Tamsulosin HCl (Flomax Cap*)  0.4 mg PO BEDTIME [facility]





 Vital Signs











  05/01/17 05/01/17 05/01/17





  13:43 15:23 19:11


 


Temperature  98.6 F 98.0 F


 


Pulse Rate 78 70 72


 


Respiratory 18 24 24





Rate   


 


Blood Pressure  127/72 126/59





(mmHg)   


 


O2 Sat by Pulse 97 98 92





Oximetry   














  05/01/17 05/02/17 05/02/17





  23:52 04:15 07:25


 


Temperature 97.6 F 97.4 F 98.1 F


 


Pulse Rate 72 77 72


 


Respiratory 20 20 20





Rate   


 


Blood Pressure 124/75 96/76 113/52





(mmHg)   


 


O2 Sat by Pulse 96 93 88





Oximetry   











Oxygen Devices in Use Now: Nasal Cannula


Appearance: Elderly, obese,  M, laying in bed in NAD


Eyes: No Scleral Icterus


Ears/Nose/Mouth/Throat: Mucous Membranes Moist


Neck: NL Appearance and Movements; NL JVP


Respiratory: Symmetrical Chest Expansion and Respiratory Effort, Clear to 

Auscultation


Cardiovascular: NL Sounds; No Murmurs; No JVD, RRR


Abdominal: - - Obese, soft, NTND, BS+


Lymphatic: No Cervical Adenopathy


Extremities: No Edema


Skin: No Rash or Ulcers


Neurological: Alert and Oriented x 3, - - Some R sided weakness, RLE>RUE


Lines/Tubes/Other Access: Clean, Dry and Intact Alexandre


Result Diagrams: 


 04/30/17 09:20





 05/02/17 06:40


Microbiology and Other Data: 


 








Assess/Plan/Problems-Billing


Assessment: 


R sided weakness, SHAWNA on CKD, hydroureter in a 66 yo M with hx of CVA, AFib on 

coumadin, KIM, BPH, chronic systolic CHF (EF 35-40%)








- Patient Problems


(1) Weakness


Current Visit: Yes   Comment: Appreciate Neurology assistance. R sided 

weakness. ?TIA/CVA. Has had these symptoms in the past with other medical 

problems, ?related to renal failure, UTI, conversion. MRI unable to be done, 

CTA contraindicated with renal failure. Carotid US and Echo done. Continue ASA 

for now. LDL at goal. Coumadin supratherapeutic.   





(2) SHAWNA (acute kidney injury)


Current Visit: Yes   Comment: on CKD. Slight improvement from yesterday. BUN/

Creatinine elevated, ?due to UTI. With urinary retention, hematuria and now 

positive UCx will go ahead and treat for UTI. May be some contribution from 

malfunctioning alexandre which has been replaced. Hydroureter noted on initial CT 

scan. Repeat renal US not helpful due to habitus. Continue IVF, check FeNa. 

Continue flomax for BPH. Holding Lasix.   





(3) Atrial fibrillation


Current Visit: No   Comment: 


Heart rate adequately controlled.  


Chronic AF -- previous cardioembolic stroke -- 11/15/2016


Coumadin is supratherapeutic, holding for now. Monitor INR


   





(4) KIM (obstructive sleep apnea)


Current Visit: No   Comment: BiPAP 16/8 when asleep, needs to be more compliant 

at [facility] with this therapy   





(5) DVT prophylaxis


Current Visit: No   Comment: On coumadin   


Status and Disposition: 





Inpatient for continued IVF, renal monitoring. Potential discharge 5/3

## 2017-05-03 LAB
ANION GAP SERPL CALC-SCNC: 3 MMOL/L (ref 2–11)
BUN SERPL-MCNC: 80 MG/DL (ref 6–24)
BUN/CREAT SERPL: 15.4 (ref 8–20)
CALCIUM SERPL-MCNC: 8.2 MG/DL (ref 8.6–10.3)
CHLORIDE SERPL-SCNC: 109 MMOL/L (ref 101–111)
GLUCOSE SERPL-MCNC: 106 MG/DL (ref 70–100)
HCO3 SERPL-SCNC: 27 MMOL/L (ref 22–32)
HCT VFR BLD AUTO: 30 % (ref 42–52)
HGB BLD-MCNC: 9.7 G/DL (ref 14–18)
POTASSIUM SERPL-SCNC: 4.4 MMOL/L (ref 3.5–5)
SODIUM SERPL-SCNC: 139 MMOL/L (ref 133–145)

## 2017-05-03 RX ADMIN — SODIUM CHLORIDE SCH MLS/HR: 900 IRRIGANT IRRIGATION at 09:02

## 2017-05-03 RX ADMIN — ASPIRIN SCH MG: 81 TABLET, COATED ORAL at 09:01

## 2017-05-03 RX ADMIN — TAMSULOSIN HYDROCHLORIDE SCH MG: 0.4 CAPSULE ORAL at 20:10

## 2017-05-03 RX ADMIN — CEFTRIAXONE SODIUM SCH MLS/HR: 1 INJECTION, POWDER, FOR SOLUTION INTRAMUSCULAR; INTRAVENOUS at 09:02

## 2017-05-03 RX ADMIN — MOMETASONE FUROATE AND FORMOTEROL FUMARATE DIHYDRATE SCH PUFF: 200; 5 AEROSOL RESPIRATORY (INHALATION) at 09:23

## 2017-05-03 RX ADMIN — MOMETASONE FUROATE AND FORMOTEROL FUMARATE DIHYDRATE SCH PUFF: 200; 5 AEROSOL RESPIRATORY (INHALATION) at 20:23

## 2017-05-03 RX ADMIN — Medication SCH UNITS: at 09:01

## 2017-05-03 NOTE — ED
Sabine FONTAINE Claudia, scribed for Florentin Maurice MD on 04/30/17 at 0902 .





Neurological HPI





- HPI Summary


HPI Summary: 





67 year old male presents to the ED via EMS from [facility]. Per 

EMS pt has right arm weakness/numbness, facial droop, slurred speech since 0730 

this am. Pt notes this am at 7:30 he had difficulty lifting his right arm and 

right leg as well as having impaired speech. Pt denies HA,CP, respiratory 

distress, numbness/tingling. Pt does note lighter sensation to his right arm in 

comparison to the left. Pt does not that the Sx are beginning to resolve. Pt 

does not think he received his am medications but he is unsure. Pt also admits 

to right lower back pain since a week or so. He also admits to hematuria 1 week 

ago spontaneously resolved. No PMHx of kidney stones. 


 PMHx of TIA 


 








- History of Current Complaint


Stated Complaint: STROKE


Hx Obtained From: Patient


Onset/Duration: Started minutes ago - SUDDEN ONSET 7:30AM TODAY, Still Present


Timing: Constant


Neurological Deficit Location: RUE, RLE


Character: Motor Weakness - RIGHT ARM WEAKNESS, Impaired Speech


Aggravating: Nothing


Alleviating: Nothing


Associated Signs and Symptoms: Positive: Weakness - RIGHT ARM NUMBNESS/WEAKNESS

, Impaired Speech.  Negative: Headache





- Additional Pertinent History


Primary Care Physician: [id number]





- Allergy/Home Medications


Allergies/Adverse Reactions: 


 Allergies











Allergy/AdvReac Type Severity Reaction Status Date / Time


 


No Known Allergies Allergy   Verified 11/20/15 08:23














PMH/Surg Hx/FS Hx/Imm Hx


Previously Healthy: Yes


Cardiovascular History: Reports: Hx Congestive Heart Failure, Hx Hypertension, 

Hx Pacemaker/ICD


Respiratory History: Reports: Hx Sleep Apnea


 History: Reports: Hx Benign Prostatic Hyperplasia, Hx Chronic Renal Failure, 

Hx Kidney Infection, Other  Problems/Disorders - history MRSA in urine


Musculoskeletal History: Reports: Hx Arthritis, Other Musculoskeletal History - 

morbid obesity


Sensory History: Reports: Hx Contacts or Glasses - for reading


   Denies: Hx Hearing Aid


Opthamlomology History: Reports: Hx Contacts or Glasses - for reading


Neurological History: Reports: Hx CVA


Psychiatric History: 


   Denies: Hx Panic Disorder





- Surgical History


Surgery Procedure, Year, and Place: HERNIA REPAIR, AICD/PACER


Hx Anesthesia Reactions: No





- Immunization History


Date of Tetanus Vaccine: up to date


Date of Influenza Vaccine: 2016


Infectious Disease History: Reports: Hx of Known/Suspected MRSA


   Denies: Hx Clostridium Difficile





- Family History


Family History: FHx of CA.  No FHx of CVA





- Social History


Occupation: Retired


Lives: Assisted Living


Alcohol Use: None


Hx Substance Use: No


Substance Use Type: Reports: None


Hx Tobacco Use: No


Smoking Status (MU): Former Smoker


Type: Cigarettes


Amount Used/How Often: 1 pack per week


Length of Time of Smoking/Using Tobacco: 1 year


Have You Smoked in the Last Year: No





Review of Systems


Constitutional: Negative


Negative: Fever, Chills


Eyes: Negative


Negative: Erythema


ENT: Negative


Negative: Sore Throat


Cardiovascular: Negative


Negative: Chest Pain


Respiratory: Negative


Negative: Shortness Of Breath, Cough


Gastrointestinal: Negative


Negative: Abdominal Pain, Vomiting, Nausea


Genitourinary: Negative


Negative: dysuria, hematuria


Musculoskeletal: Negative - NO LEG SWELLING 


Negative: Myalgia


Skin: Negative


Negative: Rash


Neurological: Negative - NO DIZZINESS 


Psychological: Normal


All Other Systems Reviewed And Are Negative: No





Physical Exam





- Summary


Physical Exam Summary: 





Constitutional: Well-developed, Well-nourished, Alert. (-) Distressed


Skin: Warm, Dry


HENT: Eyes: Conjunctiva normal


Neck: Musculoskeletal ROM normal neck. (-) JVD, (-) Stridor, (-) Tracheal 

deviation


Cardio: Rhythm regular, rate normal, Heart sounds normal; Intact distal pulses; 

The pedal pulses are 2+ and symmetric. Radial pulses are 2+ and symmetric. (-) 

Murmur


Pulmonary/Chest wall: Effort normal. (-) Respiratory distress, (-) Wheezes, (-) 

Rales


Abd: Soft. (-) Tenderness,  (-) Distension, (-) Guarding, (-) Rebound


Musculoskeletal: (-) Edema


Lymph: (-) Cervical adenopathy


Neuro: Alert, Oriented x3, Strength normal, Cranial nerves II-XII are grossly 

intact. (-) Dysmetria, (-) Nystagmus, (-


Ataxia by finger to nose testing, (-) Sensory deficit.


right pronator drift


righ  strength weakness


diminished sensation right side with light touch  


no facial asymmetry


Psych: Mood and affect Normal








Triage Information Reviewed: Yes


Vital Signs Reviewed: Yes





Diagnostics





- Laboratory


Result Diagrams: 


 04/30/17 09:20





 04/30/17 09:20


Lab Statement: Any lab studies that have been ordered have been reviewed, and 

results considered in the medical decision making process.





- Radiology


  ** CXR


Xray Interpretation: No Acute Changes - SEVERE CARDIOMEGALY


Radiology Interpretation Completed By: ED Physician





- CT


  ** BRAIN CT


CT Interpretation: No Acute Changes - NO OBVIOUS BLEED, MOTION ARTIFACT


CT Interpretation Completed By: ED Physician





  ** ABD/PELVIS


CT Interpretation: Positive (See Comments) - 1.Cardiomegaly with associated 

partial atelectasis of the LEFT lung without change. 2. Mild colonic 

diverticulosis without findings of diverticulitis. Normal appendix documented. 

3. Mild bladder wall thickening and reticulation of the fat surrounding the 

urinary bladder; correlate for potential cystitis. And bilateral hydroureter 

without dilatation of the renal calyces. Moderate RIGHT and moderately severe 

LEFT renal cortical atrophy.


CT Interpretation Completed By: Radiologist





- EKG


  ** 10:11


Cardiac Rate: NL - 76 beats/min


EKG Interpretation: Ventricular Paced Rhythm, No STEMI





NIH Scale





- NIH Scale


Level of Consciousness: Alert/Keenly Responsive


Ask Patient the Month and His/Her Age: Both Correct


Ask Pt to Open/Close Eyes and /Release Non-Paretic Hand: Both Correctly


Best Gaze (Only Horizontal Eye Movement): Normal


Visual Field Testing: No Visual Loss


Facial Paresis-Pt to Smile & Close Eyes or Grimace Symmetry: Normal/Symmetrical


Motor Function - Right Arm: Drifts LT 10 seconds


Motor Function - Left Arm: No Drift-Holds 10 Seconds


Motor Function - Right Leg: Effort Against Gravity


Motor Function - Left Leg: No Drift-Holds 10 Seconds


Limb Ataxia-Must be out of Proportion to Weakness Present: Absent


Sensory (Use Pinprick to Test Arms/Legs/Trunk/Face): Normal


Best Language (Describe Picture, Name Items): No Aphasia


Dysarthria (Read Several Words): Slurs Some Words


Extinction and Inattention: No Abnormality


Total Score: 4





Course/Dx





- Course


Assessment/Plan: Pt will be admitted with TIA, urinary retention, renal failure 

and pyelonephritis.





- Diagnoses


Provider Diagnoses: 


 Renal failure, Urinary retention, TIA (transient ischemic attack), 

Pyelonephritis





During the Visit The Following Alert/Code Occurred: Code Grey - 8:49am





- Physician Notifications


Discussed Care of Patient With: Discussed care of pt with Dr. Leyva. 9:15.  

Discussed care of pt with Dr. Mckee.


Time Discussed With Above Provider: 10:29


Instructed by Provider To: Admit As Inpatient





Discharge





- Discharge Plan


Condition: Stable


Disposition: ADMITTED TO [facility]


Patient Education Materials:  Transient Ischemic Attack (ED), Urinary Retention 

in Men (ED)


Referrals: 


Maghaydah,Qutaybeh, MD [Primary Care Provider] - 





The documentation as recorded by the Sabine padilla Claudia accurately 

reflects the service I personally performed and the decisions made by me, Florentin Maurice MD.

## 2017-05-03 NOTE — PN
Subjective


Date of Service: 05/03/17


Interval History: 


HOSPITALIST PROGRESS NOTE


Patient seen and examined at bedside.


He offers no new complaints today. Denies chest pain, dyspnea. Feels his 

weakness is improving.


Family History: Unchanged from Admission


Social History: Unchanged from Admission


Past Medical History: Unchanged from Admission





Objective


Active Medications: 








Acetaminophen (Tylenol Tab*)  650 mg PO Q6H PRN


   PRN Reason: PAIN


   Last Admin: 05/01/17 12:52 Dose:  650 mg


Albuterol/Ipratropium (Duoneb (Albuterol 2.5 Mg/Ipratropium 0.5 Mg))  1 neb INH 

Q4H PRN


   PRN Reason: SOB/WHEEZING


Aspirin (Aspirin Ec Low Dose*)  81 mg PO DAILY Replaced by [facility]


   Last Admin: 05/03/17 09:01 Dose:  81 mg


Cholecalciferol (Vitamin D Tab*)  2,000 units PO DAILY Replaced by [facility]


   Last Admin: 05/03/17 09:01 Dose:  2,000 units


Sodium Chloride (Ns 0.9% 1000 Ml*)  1,000 mls @ 100 mls/hr IV PER RATE Replaced by [facility]


   Last Admin: 05/03/17 09:02 Dose:  100 mls/hr


Ceftriaxone Sodium 1,000 mg/ (Sodium Chloride)  50 mls @ 200 mls/hr IVPB Q24H 

Replaced by [facility]


   Last Admin: 05/03/17 09:02 Dose:  200 mls/hr


Mometasone Furoate/Formoterol Fumar (Dulera 200/5 Mdi*)  2 puff INH BID Replaced by [facility]


   Last Admin: 05/03/17 09:23 Dose:  2 puff


Tamsulosin HCl (Flomax Cap*)  0.4 mg PO BEDTIME Replaced by [facility]


   Last Admin: 05/02/17 20:53 Dose:  0.4 mg


Warfarin Sodium (Coumadin Tab(*))  3 mg PO DAILY@1700 Replaced by [facility]


   PRN Reason: Protocol








Vital Signs











  05/03/17 05/03/17 05/03/17





  09:25 11:10 13:40


 


Temperature  97.6 F 97.8 F


 


Pulse Rate 22 70 73


 


Respiratory 77 20 22





Rate   


 


Blood Pressure  109/60 121/49





(mmHg)   


 


O2 Sat by Pulse 96 96 94





Oximetry   











Oxygen Devices in Use Now: Nasal Cannula


Appearance: Elderly morbid obese male lying in bed in NAD.


Eyes: No Scleral Icterus


Ears/Nose/Mouth/Throat: Mucous Membranes Moist


Neck: Trachea Midline


Respiratory: Symmetrical Chest Expansion and Respiratory Effort, Clear to 

Auscultation


Cardiovascular: RRR - Normal S1 and S2


Abdominal: NL Sounds; No Tenderness; No Distention - obese


Neurological: Alert and Oriented x 3, - - Mild right hemiparesis


Lines/Tubes/Other Access: Clean, Dry and Intact Peripheral IV


Nutrition: Taking PO's


Result Diagrams: 


 05/03/17 04:51





 05/03/17 04:51





Assess/Plan/Problems-Billing


Assessment: 


Mr. Culver is a 68yo M with PMH of KIM (non-compliant with CPAP), prior CVA, Afib

, HTN, BPH, morbid obesity with BMI 54, CHF, who presented with R sided weakness

, SHAWNA on CKD, and hydroureter.





- Patient Problems


(1) Left sided cerebral hemisphere cerebrovascular accident (CVA)


Comment: - Neurology input appreciated - possible left hemisphere stroke - 

recommended no new changes to his regimen - continue anticoagulation.


- Echo and carotid doppler reviewed.


- Continue Aspirin.   





(2) SHAWNA (acute kidney injury)


Comment: - on CKD. 


- Creatinine continues to trend down.


- With urinary retention, hematuria and UCx growing Proteus and decision was 

made to start Ceftriaxone.


- May be some contribution from malfunctioning alexandre which has been replaced on 

04/30. - Hydroureter noted on initial CT scan. Repeat renal US not helpful due 

to habitus. Will repeat CT without contrast.   





(3) Atrial fibrillation


Comment: - Heart rate adequately controlled.  


- Chronic AF with previous cardioembolic stroke in 11/15/2016.


- Continue Warfarin and monitor INR.


   





(4) KIM (obstructive sleep apnea)


Comment: - BiPAP 16/8.   





(5) DVT prophylaxis


Comment: - Warfarin.   





(6) Full code status





Status and Disposition: 


Anticipate d/c on 05/04 if renal function continues to improve and CT shows 

resolution of hydro.

## 2017-05-03 NOTE — RAD
INDICATION:  Hydronephrosis.



COMPARISON:  Comparison is made with a prior CT of the abdomen and pelvis from April 30, 2017.



TECHNIQUE: A CT scan of the abdomen and pelvis was performed without intravenous or oral

contrast. Contiguous axial sections were obtained from the lung bases through the

symphysis pubis. Images were reconstructed in the coronal and sagittal planes. The exam is

extremely limited due to the patient's body habitus.



FINDINGS:  There is mild dependent bilateral lower lobe subsegmental atelectasis.  No

pleural effusion is present.



The liver and spleen are moderately enlarged without significant focal abnormality on this

noncontrast study. The gallbladder and pancreas are not well-defined.



The kidneys are small in size with bilateral cortical atrophy. There has been interval

resolution of right hydronephrosis and interval decrease in left hydronephrosis. There is

a catheter within the urinary bladder. 



The aorta is normal in caliber with mild calcific plaque present.



No significant enlarged retroperitoneal lymph nodes are seen.



The bowel is not well-defined. The stomach, small and large bowel appear nondistended.

There are diverticuli scattered throughout the colon.



No free intraperitoneal air or fluid is seen.



No fracture is seen.



IMPRESSION:  

1. EXTREMELY LIMITED STUDY.

2. BILATERAL RENAL CORTICAL ATROPHY.  INTERVAL RESOLUTION OF RIGHT HYDRONEPHROSIS AND

INTERVAL IMPROVEMENT IN LEFT HYDRONEPHROSIS. THE RESIDUAL LEFT URETERAL DILATATION MAY

REPRESENT A CHRONIC FINDING.                                                              

                                                                                          

      3. MODERATE HEPATOSPLENOMEGALY.

## 2017-05-04 VITALS — DIASTOLIC BLOOD PRESSURE: 53 MMHG | SYSTOLIC BLOOD PRESSURE: 109 MMHG

## 2017-05-04 LAB
ANION GAP SERPL CALC-SCNC: 5 MMOL/L (ref 2–11)
BUN SERPL-MCNC: 75 MG/DL (ref 6–24)
BUN/CREAT SERPL: 15.4 (ref 8–20)
CALCIUM SERPL-MCNC: 8.3 MG/DL (ref 8.6–10.3)
CHLORIDE SERPL-SCNC: 110 MMOL/L (ref 101–111)
GLUCOSE SERPL-MCNC: 89 MG/DL (ref 70–100)
HCO3 SERPL-SCNC: 23 MMOL/L (ref 22–32)
HCT VFR BLD AUTO: 31 % (ref 42–52)
HGB BLD-MCNC: 9.6 G/DL (ref 14–18)
MCH RBC QN AUTO: 27 PG (ref 27–31)
MCHC RBC AUTO-ENTMCNC: 32 G/DL (ref 31–36)
MCV RBC AUTO: 85 FL (ref 80–94)
POTASSIUM SERPL-SCNC: 4.3 MMOL/L (ref 3.5–5)
RBC # BLD AUTO: 3.59 10^6/UL (ref 4–5.4)
SODIUM SERPL-SCNC: 138 MMOL/L (ref 133–145)
WBC # BLD AUTO: 6.7 10^3/UL (ref 3.5–10.8)

## 2017-05-04 RX ADMIN — CEFTRIAXONE SODIUM SCH MLS/HR: 1 INJECTION, POWDER, FOR SOLUTION INTRAMUSCULAR; INTRAVENOUS at 09:33

## 2017-05-04 RX ADMIN — Medication SCH UNITS: at 09:33

## 2017-05-04 RX ADMIN — MOMETASONE FUROATE AND FORMOTEROL FUMARATE DIHYDRATE SCH PUFF: 200; 5 AEROSOL RESPIRATORY (INHALATION) at 08:15

## 2017-05-04 RX ADMIN — ASPIRIN SCH MG: 81 TABLET, COATED ORAL at 09:33

## 2017-05-04 NOTE — DS
CC:  Dr. Leyla Cherry, [facility]

 

DATE OF ADMISSION:  04/30/2017.

 

DATE OF DISCHARGE:  05/04/2017.

 

DISCHARGE DIAGNOSES:

1.  Probable left hemisphere CVA.

2.  Acute kidney injury, likely postrenal.

3.  Urinary retention, likely secondary to benign prostatic hypertrophy.

4.  Proteus/klebsiella urinary tract injection, not Vallecillo catheter related, 
present on admission.

5.  Chronic anemia, suspect anemia of renal disease.

 

SECONDARY DIAGNOSES:

1.  Morbid obesity with a BMI of 54.

2.  Obstructive sleep apnea, noncompliant with CPAP.

3.  History of prior CVA.

4.  Atrial fibrillation, on anticoagulation with Warfarin.

5.  Hypertension.

6.  History of prior MRSA infection.

7.  Benign prostatic hypertrophy.

8.  Diastolic congestive heart failure.

 

MEDICATIONS:

1.  Acetaminophen 650 mg p.o. q.6 hours prn pain or fever.

2.  Albuterol 2.5 mg nebulized t.i.d.

3.  Vitamin D 2000 units p.o. daily.

4.  Atrovent 0.5 mg nebulized t.i.d.

5.  Dulera 200/5 two puffs inhaled b.i.d.

6.  Tamsulosin 0.4 mg p.o. at bedtime.

 

Medication change:  Warfarin dose was changed to 3 mg p.o. daily.

 

New medications:

1.  Aspirin 81 mg p.o. daily.

2.  Augmentin 375 mg p.o. b.i.d. for 14 days.

 

HOSPITAL COURSE:  Mr. Culver is a 67-year-old male with a past medical history 
stated above who was referred from [facility] on April 30th with complaints of 
right side weakness.  It was described that when he woke up he could not lift 
up his right leg or arm, and the patient also felt that he had some slurred 
speech.  The patient was admitted under the impression of a possible transient 
ischemic attack for further work-up.  He was also found to be in acute renal 
failure with a creatinine of 5.6 from a baseline around 3.  For more details 
about his presentation, I refer you to his history and physical.

 

An initial CT of the brain showed no evidence for intracranial hemorrhage or 
gross stigmata of ischemic infarct.  Chest x-ray showed interstitial pulmonary 
edema with chronic left pleural effusion and significant atelectasis at the 
left lung.

 

The patient was seen in consultation by Neurology (Dr. Koo) and his 
impression was that the patient had a possible left hemisphere stroke.  However
, he felt that his exam looked somewhat functional.  The patient clearly has 
severe metabolic problems in terms of his major vascular risk factors; however, 
he is already fully anticoagulated.  He felt that aspirin in addition to his 
Warfarin was reasonable, at least for the short term.  He recommended a carotid 
ultrasound that showed mildly elevated peak systolic velocities of the ICA 
bilaterally consistent with bilateral internal carotid artery stenosis between 
50 and 69 percent.  This was a limited study and Radiology suggested 
considering CTA or MRA of the neck, but the patient's renal function precluded 
those studies and Dr. Koo felt that he did not have carotid disease that 
would benefit from surgery.  Transthoracic echocardiogram was performed and it 
showed diffuse global hypokinesis of the left ventricle with an ejection 
fraction of 45 to 50 percent, pacemaker wire visualized in the right atrium, 
moderate to severe MR, moderate to severe pulmonary hypertension.  A 
transesophageal echo done in 2016 included an agitated saline study which was 
negative.

 

Dr. Koo felt that it was possible the patient had a left hemisphere stroke 
or that his symptoms could be associated with conversion disorder.  I also 
think that maybe the patient's urinary tract infection exacerbated deficits 
from a prior CVA.  Dr. Koo's recommendation was to continue the management 
with aspirin and Warfarin.  The patient's lipid profile was excellent with an 
LDL of 63, so statins are not indicated.  The patient's blood pressure control 
is acceptable and the final recommendation was to continue physical therapy and 
anticoagulation with Warfarin.

 

The patient was found to have urinary retention. A CT of the abdomen and pelvis 
was performed without contrast and it showed cardiomegaly with partial left 
atelectasis, mild colonic diverticulosis, mild bladder wall thickening and 
reticulation of the fat surrounding the urinary bladder, correlate for 
potential cystitis.  There was bilateral hydroureter without dilatation of the 
renal calyces.  Moderate right and moderate severe left renal cortical atrophy.

 

A Vallecillo catheter was placed.  After the Vallecillo catheter was placed, the patient'
s creatinine continued to trend down and on the day of discharge it is down to 
4.8. Urine culture grew proteus mirabilis and klebsiella pneumoniae, and the 
patient was treated with IV Ceftriaxone and he is being switched to Augmentin 
on discharge.  I believe his urinary retention is likely secondary to BPH and 
the patient is already on Tamsulosin.  He is being discharged with the Vallecillo 
catheter, but I believe after his UTI is treated he should follow-up with 
Urology before having the catheter removed to decide what is going to be the 
next management.  As it is possible he had a CVA at this time, surgical 
procedures are contraindicated for at least 90 days.

 

The patient had improvement of his symptoms.  He feels that his right 
hemiparesis is close to his baseline and he was thought to be stable for 
discharge.  A follow- up abdomen and pelvis CT was performed.  It was an 
extremely limited study with bilateral renal cortical atrophy, but it showed 
interval resolution of the right hydronephrosis and interval improvement in the 
left hydronephrosis. The residual left ureteral dilatation may represent a 
chronic finding.

 

The patient is medically stable to be discharged back to [facility] today to 
continue rehabilitation with Physical Therapy and Occupational Therapy.  He 
should have his BMP and INR checked tomorrow and then they should be monitored 
accordingly to his PCP routine.  As stated above, he should follow-up with 
Urology after he completes his UTI treatment before the Vallecillo catheter is 
removed as I am afraid he can develop urinary retention again.

 

PHYSICAL EXAMINATION:  General:  The patient is a morbidly obese, elderly male, 
lying in bed in no acute distress.  Vital Signs:  Temperature 98.5, heart rate 
70, respiratory rate 20, oxygen saturation 97 percent on 4 liters, blood 
pressure 109/53.  CVS:  Normal S1, S2.  Regular rate and rhythm.  Chest:  
Breath sounds bilaterally decreased in bases.  Abdomen:  Obese, soft.  Bowel 
sounds are present. Neuro:  He is alert, awake and oriented times three with 
mild right hemiparesis.

 

DIET:  Heart-healthy diet.

 

ACTIVITY:  As tolerated.

 

DISPOSITION:  To home.

 

STATUS WHILE IN THE HOSPITAL:  Inpatient.

 

Please keep in mind this is a summarized version of this patient's hospital 
stay. If you need more information, please feel free to call me at [phone]
[phone] or please obtain the full medical records.

 

Approximately 50 minutes were spent to complete this discharge.

 

 [id number]/CPS #: [id number]

NICOLAS

## 2017-06-21 ENCOUNTER — HOSPITAL ENCOUNTER (INPATIENT)
Dept: HOSPITAL 25 - ED | Age: 67
LOS: 2 days | Discharge: NURSING, REHAB OR LONG-TERM CARE | DRG: 291 | End: 2017-06-23
Attending: INTERNAL MEDICINE | Admitting: HOSPITALIST
Payer: MEDICARE

## 2017-06-21 DIAGNOSIS — Z79.82: ICD-10-CM

## 2017-06-21 DIAGNOSIS — G47.33: ICD-10-CM

## 2017-06-21 DIAGNOSIS — J44.9: ICD-10-CM

## 2017-06-21 DIAGNOSIS — Z95.810: ICD-10-CM

## 2017-06-21 DIAGNOSIS — Z80.0: ICD-10-CM

## 2017-06-21 DIAGNOSIS — Z79.01: ICD-10-CM

## 2017-06-21 DIAGNOSIS — Z86.73: ICD-10-CM

## 2017-06-21 DIAGNOSIS — J96.10: ICD-10-CM

## 2017-06-21 DIAGNOSIS — Z80.1: ICD-10-CM

## 2017-06-21 DIAGNOSIS — I50.43: ICD-10-CM

## 2017-06-21 DIAGNOSIS — I48.91: ICD-10-CM

## 2017-06-21 DIAGNOSIS — E66.01: ICD-10-CM

## 2017-06-21 DIAGNOSIS — I13.0: Primary | ICD-10-CM

## 2017-06-21 DIAGNOSIS — M19.90: ICD-10-CM

## 2017-06-21 DIAGNOSIS — Z86.14: ICD-10-CM

## 2017-06-21 DIAGNOSIS — Z87.891: ICD-10-CM

## 2017-06-21 DIAGNOSIS — N40.0: ICD-10-CM

## 2017-06-21 DIAGNOSIS — N18.3: ICD-10-CM

## 2017-06-21 DIAGNOSIS — Z82.3: ICD-10-CM

## 2017-06-21 LAB
ALBUMIN SERPL BCG-MCNC: 3.4 G/DL (ref 3.2–5.2)
ALP SERPL-CCNC: 87 U/L (ref 34–104)
ALT SERPL W P-5'-P-CCNC: 7 U/L (ref 7–52)
ANION GAP SERPL CALC-SCNC: 5 MMOL/L (ref 2–11)
AST SERPL-CCNC: 12 U/L (ref 13–39)
BUN SERPL-MCNC: 38 MG/DL (ref 6–24)
BUN/CREAT SERPL: 13.3 (ref 8–20)
CALCIUM SERPL-MCNC: 9 MG/DL (ref 8.6–10.3)
CHLORIDE SERPL-SCNC: 104 MMOL/L (ref 101–111)
GLOBULIN SER CALC-MCNC: 3.7 G/DL (ref 2–4)
GLUCOSE SERPL-MCNC: 90 MG/DL (ref 70–100)
HCO3 SERPL-SCNC: 29 MMOL/L (ref 22–32)
HCT VFR BLD AUTO: 37 % (ref 42–52)
HGB BLD-MCNC: 11.7 G/DL (ref 14–18)
MCH RBC QN AUTO: 25 PG (ref 27–31)
MCHC RBC AUTO-ENTMCNC: 31 G/DL (ref 31–36)
MCV RBC AUTO: 81 FL (ref 80–94)
POTASSIUM SERPL-SCNC: 4.2 MMOL/L (ref 3.5–5)
PROT SERPL-MCNC: 7.1 G/DL (ref 6.4–8.9)
RBC # BLD AUTO: 4.62 10^6/UL (ref 4–5.4)
SODIUM SERPL-SCNC: 138 MMOL/L (ref 133–145)
TROPONIN I SERPL-MCNC: 0.01 NG/ML (ref ?–0.04)
WBC # BLD AUTO: 6.5 10^3/UL (ref 3.5–10.8)

## 2017-06-21 PROCEDURE — 80048 BASIC METABOLIC PNL TOTAL CA: CPT

## 2017-06-21 PROCEDURE — 84145 PROCALCITONIN (PCT): CPT

## 2017-06-21 PROCEDURE — 80053 COMPREHEN METABOLIC PANEL: CPT

## 2017-06-21 PROCEDURE — 5A09357 ASSISTANCE WITH RESPIRATORY VENTILATION, LESS THAN 24 CONSECUTIVE HOURS, CONTINUOUS POSITIVE AIRWAY PRESSURE: ICD-10-PCS | Performed by: INTERNAL MEDICINE

## 2017-06-21 PROCEDURE — 94760 N-INVAS EAR/PLS OXIMETRY 1: CPT

## 2017-06-21 PROCEDURE — G0378 HOSPITAL OBSERVATION PER HR: HCPCS

## 2017-06-21 PROCEDURE — 84484 ASSAY OF TROPONIN QUANT: CPT

## 2017-06-21 PROCEDURE — 83605 ASSAY OF LACTIC ACID: CPT

## 2017-06-21 PROCEDURE — 93005 ELECTROCARDIOGRAM TRACING: CPT

## 2017-06-21 PROCEDURE — 94660 CPAP INITIATION&MGMT: CPT

## 2017-06-21 PROCEDURE — 83880 ASSAY OF NATRIURETIC PEPTIDE: CPT

## 2017-06-21 PROCEDURE — 86140 C-REACTIVE PROTEIN: CPT

## 2017-06-21 PROCEDURE — 87040 BLOOD CULTURE FOR BACTERIA: CPT

## 2017-06-21 PROCEDURE — 71010: CPT

## 2017-06-21 PROCEDURE — 85025 COMPLETE CBC W/AUTO DIFF WBC: CPT

## 2017-06-21 PROCEDURE — 36415 COLL VENOUS BLD VENIPUNCTURE: CPT

## 2017-06-21 PROCEDURE — 85610 PROTHROMBIN TIME: CPT

## 2017-06-21 PROCEDURE — 94640 AIRWAY INHALATION TREATMENT: CPT

## 2017-06-21 RX ADMIN — TAMSULOSIN HYDROCHLORIDE SCH MG: 0.4 CAPSULE ORAL at 20:40

## 2017-06-21 RX ADMIN — MOMETASONE FUROATE AND FORMOTEROL FUMARATE DIHYDRATE SCH PUFF: 200; 5 AEROSOL RESPIRATORY (INHALATION) at 19:17

## 2017-06-21 RX ADMIN — SENNOSIDES SCH TAB: 8.6 TABLET, FILM COATED ORAL at 19:00

## 2017-06-21 RX ADMIN — DOCUSATE SODIUM SCH MG: 100 CAPSULE, LIQUID FILLED ORAL at 19:00

## 2017-06-21 RX ADMIN — IPRATROPIUM BROMIDE AND ALBUTEROL SULFATE SCH NEB: .5; 3 SOLUTION RESPIRATORY (INHALATION) at 19:17

## 2017-06-21 RX ADMIN — MAGNESIUM HYDROXIDE PRN ML: 400 SUSPENSION ORAL at 19:00

## 2017-06-21 RX ADMIN — IPRATROPIUM BROMIDE AND ALBUTEROL SULFATE SCH: .5; 3 SOLUTION RESPIRATORY (INHALATION) at 23:03

## 2017-06-21 NOTE — HP
CC:  Dr. Cherry, [facility] *

 

HISTORY AND PHYSICAL:

 

DATE OF ADMISSION:  06/21/17

 

PRIMARY CARE PROVIDER:  Dr. Cherry.

 

ATTENDING PHYSICIAN WHILE IN THE HOSPITAL:  Dr. Garcia * (report dictated by 
Brooke Hunt NP).

 

CHIEF COMPLAINT:  Shortness of breath.

 

HISTORY OF PRESENT ILLNESS:  Mr. Culver is a 67-year-old male patient who 
carries a history of TIA, CVA, AFib, KIM, CHF which is diastolic.  The last 
documented EF from February of this year is noted to be 45% to 50%.  He also 
has a history of hypertension, MRSA, and BPH.  He comes in to the ER today 
stating that over the last week he was recently treated with pneumonia.  He 
just finished his last dose yesterday.  He was feeling better.  He denied 
having anymore cough, fevers, or chills.  He says he had an x-ray done at 
[facility], which was concerning for a pneumonia.  I do not have that report 
here.  He finished his Levaquin; however, today, he woke up, he took his CPAP 
off this morning and he felt like he could not get his air out, he said, that 
was making him short of breath.  He denied having any chest pain.  He denied 
having any pain with inspiration.  He denied having any recent swelling of the 
legs or calf pain.  He does state that he has gained 20 to 30 pounds in the 
last 2 to 3 months.  He says that he cannot lie flat.  He noticed that he was 
really short of breath first thing this morning.  He alerted nursing staff at 
[facility].  They tried giving him 2 nebulizers with no help, and after this, 
because he was not improving, they sent him over to the ER to be evaluated. He 
denied any fevers, denied any chills.  Denies having any throat pain or neck 
pain.  Denies any recent choking.  He says that he again has no calf or leg 
pain or leg tenderness.  He was evaluated here by Dr. Madison.  Chest x-ray showed 
possible pulmonary edema and hospitalist service was asked to evaluate for 
admission.  He says he has been taking his medications as prescribed and he had 
been sticking to a low-salt diet.

 

PAST MEDICAL HISTORY:  Significant for:

1.  TIA.

2.  CVA.

3.  AFib.

4.  KIM.

5.  CHF.

6.  Hypertension.

7.  MRSA.

8.  BPH.

 

PAST SURGICAL HISTORY:

1.  He has had a defibrillator pacemaker placement.

2.  He has had a hernia repair.

 

HOME MEDICATIONS:  According to the list provided include:

1.  Warfarin 4 mg p.o. daily.

2.  Flomax 0.4 mg p.o. at bedtime.

3.  Senna 1 tablet p.o. daily.

4.  Dulera 2 puffs inhaled b.i.d.

5.  Milk of magnesia 30 cc p.o. daily.

6.  Levaquin 500 mg at bedtime but he just finished the last dose.

7.  Atrovent 0.5 mg inhaled t.i.d.

8.  Lasix 20 mg daily as needed.

9.  Vitamin D 2000 units p.o. daily.

10.  Aspirin 81 mg daily.

11.  Tylenol 650 mg every 6 hours as needed.

 

ALLERGIES TO MEDICATIONS:  Include no known drug allergies.

 

FAMILY HISTORY:  His mother had a history of colon cancer.  Father had a 
history of lung cancer.

 

SOCIAL HISTORY:  He is a former smoker.  He lives at [facility].  Surrogate 
decision maker is his brother.

 

REVIEW OF SYSTEMS:  There is no documented fever.  He does admit to having a 20
- to 30-pound weight gain.  He denies having any double vision.  There was no 
ear discharge.  He denies having any rhinorrhea.  No sore throat.  No thyroid 
enlargement.  He denies having any chest pain.  There was some orthopnea.  No 
nocturnal dyspnea.  No lightheadedness.  No dysuria.  No frequency.  There was 
no loss of consciousness.  Review of 14 systems completed, all others negative.

 

                               PHYSICAL EXAMINATION

 

GENERAL:  At this time, Mr. Culver is a 67-year-old male patient.  He is 
chronically ill appearing.  He does appear to be morbidly obese.  He does not 
appear to be in any acute respiratory distress.  He is talking full sentences.  
He is awake and he is alert.

 

VITAL SIGNS:  Blood pressure 121/61 with a pulse of 68, respirations were noted 
to be 24, O2 sat was 100%.  When I count the respirations now, he appears to be 
right around 20 breaths a minute.  He was on 6 L.  I did titrate him down to 4 
L of oxygen, and he is satting around 100%.  He normally wears between 2 and 3 
L of oxygen a day, 4 L at night.

 

HEENT:  Head atraumatic.  Eyes:  EOMs intact.  Sclerae anicteric.  Throat:  
Oral mucosa appears to be moist.  No oropharyngeal erythema.

 

NECK:  Supple.

 

LUNGS:  He did have some expiratory wheeze noted in the lower lobes 
bilaterally. No rhonchi or rales noted.

 

HEART:  Sounds S1, S2.  Regular rate and rhythm.  No murmurs, rubs, or gallops.

 

ABDOMEN:  Soft, flat, nontender.  Bowel sounds present.

 

EXTREMITIES:  Pulses were 2+ throughout.  He is able to move all 4 extremities 
with 5/5 strength.

 

NEUROLOGIC:  He is awake, he is alert, and he is oriented x3.  His tongue is 
midline.  His  are equal.  He had no gross focal neurological deficits.

 

SKIN:  Intact.

 

 DIAGNOSTIC STUDIES/LAB DATA:  The labs today revealed a WBC of 6.5, RBC of 4.62
, hemoglobin 11.7, hematocrit of 37, platelet count of 205.  INR was 3.13.  The 
sodium was 138, potassium was 4.2, chloride of 104, bicarb 29, BUN 38, 
creatinine of 2.85, glucose 90, lactic 1.1, calcium 9.0.  Total bili 0.7, AST 12
, ALT 7, alk phos 87.  Troponin 0.01.  CRP of 25.  Albumin of 3.4.

 

He did have a chest x-ray obtained today which revealed a limited exam 
suggestive of pulmonary vascular congestion and interstitial edema.  When I 
looked at the film again, to me very limited exam.  I do not see any pleural 
effusions or infiltrates. Certainly, I do see some vascular congestion on this 
film possibly, it is reviewed to with a previous x-ray.  Again, today's film is 
of poor quality.

 

He did have an EKG obtained today which showed a ventricular paced rhythm, no 
other analysis at this point.  Last EF from April showed an EF of 45% to 50%.  
Old medical records reviewed.

 

ASSESSMENT AND PLAN:  Mr. Culver is a 67-year-old male patient with multiple 
medical problems, coming into the ER today with complaints of shortness of 
breath particularly when he tries to exhale.  He will be admitted under 
observation status for:

 

1.  Shortness of breath:  Etiology is unclear.  Certainly, he does have a 
wheeze on exam and certainly his x-ray does point towards some interstitial 
edema.  The BNP is the lowest it has ever been, however.  My plan at this point 
is to go ahead and give him some Bumex to see how he responds at this point.  I 
will get a chest CT to better define his lungs.  I do not think he has any 
active pneumonia.  Now, he is not coughing.  He has no fever.  The white count 
is stable.  Likely monitor him.  I am going to give him nebs around the clock.  
I am going to hold off on steroids. Continue Dulera as he normally takes and we 
will see how he responds.  We will place him on telemetry.  We will monitor his 
troponins and check daily weights.

2.  History of cerebrovascular accident and transient ischemic attack:  
Continue with secondary prevention.  He is on an aspirin.  We will continue his 
warfarin.

3.  Atrial fibrillation:  Continue his warfarin and he is paced and is rate 
controlled.

4.  Obstructive sleep apnea:  I did order a CPAP.

5.  History of congestive heart failure:  Again, we are going to diurese him.  
We will check weights and monitor him closely.

6.  Hypertension:  Continue current medical regimen.

7.  History of methicillin-resistant Staphylococcus aureus:  I will order 
isolation and contact precautions.

8.  DVT prophylaxis:  INR is 3.13.  So, we will skip tonight's Coumadin dose. 
Repeat the INR tomorrow and we will continue Coumadin when able.

9.  Benign prostatic hypertrophy:  Continue with current medical regimen.

10.  Code status:  Full code.

11.  Fluids, electrolytes, and nutrition:  He can have a heart healthy diet.

 

TIME SPENT:  Time spent on the admission was 60 minutes, greater than half the 
time was spent face-to-face with the patient obtaining my history and physical, 
other half time spent going over the plan of care with the patient and 
implementing plan of care.  I did discuss the plan of care with my attending, 
Dr. Garcia; she is in agreement.

 

____________________________________ BROOKE HUNT NP

 

[id number]/CPS #: [id number]

NICOLAS

## 2017-06-21 NOTE — ED
Miguel FONTAINE Salem, scribed for Christiano Madison MD on 06/21/17 at 1248 .





Shortness of Breath





- HPI Summary


HPI Summary: 





Patient is a 68 y/o M who presents to the ED with sudden onset SOB since 0500 

this morning. He reports a mild cough, but denies pedal edema, CP, fever, or 

chills. He states that he received two breathing treatments at his nursing home 

PTA. Pt states that he has a hx of SOB, A Fib, CVA, and PNA, but denies 

cardiomegaly or COPD. Pt takes Coumadin, and has a pacemaker and defibrillator. 

NKDA. 





- History of Current Complaint


Chief Complaint: EDRespiratoryDistress


Time Seen by Provider: 06/21/17 11:47


Hx Obtained From: Patient


Onset/Duration: Sudden Onset, Lasting Hours, Still Present


Timing: Constant


Current Severity: Moderate


Dyspnea At: Rest


Aggrevating Factors: Nothing


Alleviating Factors: Nothing


Associated Signs & Symptoms: Cough (Productive)


Related History: Obesity





- Allergy/Home Medications


Allergies/Adverse Reactions: 


 Allergies











Allergy/AdvReac Type Severity Reaction Status Date / Time


 


No Known Allergies Allergy   Verified 11/20/15 08:23











Home Medications: 


 Home Medications





Furosemide TAB* [Lasix TAB*] 20 mg PO DAILY PRN 06/21/17 [History Confirmed 06/ 21/17]


Levofloxacin TAB* [Levaquin TAB*] 500 mg PO BEDTIME 06/21/17 [History Confirmed 

06/21/17]


Magnesium Hydroxide LIQ* [Milk of Magnesia LIQ*] 30 ml PO DAILY PRN 06/21/17 [

History Confirmed 06/21/17]


Senna/Docusate (NF) [Sennokot-S] 1 tab PO DAILY 06/21/17 [History Confirmed 06/ 21/17]


Warfarin TAB(*) [Coumadin TAB(*)] 4 mg PO 1700 06/21/17 [History Confirmed 06/21 /17]











PMH/Surg Hx/FS Hx/Imm Hx


Cardiovascular History: Reports: Hx Congestive Heart Failure, Hx Hypertension, 

Hx Pacemaker/ICD


Respiratory History: Reports: Hx Sleep Apnea


   Denies: Hx Asthma, Hx Chronic Obstructive Pulmonary Disease (COPD)


 History: Reports: Hx Benign Prostatic Hyperplasia, Hx Chronic Renal Failure, 

Hx Kidney Infection, Other  Problems/Disorders - history MRSA in urine


Musculoskeletal History: Reports: Hx Arthritis, Other Musculoskeletal History - 

morbid obesity


Sensory History: Reports: Hx Contacts or Glasses - for reading


   Denies: Hx Hearing Aid


Opthamlomology History: Reports: Hx Contacts or Glasses - for reading


Neurological History: Reports: Hx CVA


Psychiatric History: 


   Denies: Hx Panic Disorder





- Surgical History


Surgery Procedure, Year, and Place: HERNIA REPAIR, AICD/PACER


Hx Anesthesia Reactions: No





- Immunization History


Date of Tetanus Vaccine: up to date


Date of Influenza Vaccine: 2016


Infectious Disease History: No


Infectious Disease History: Reports: Hx of Known/Suspected MRSA


   Denies: Hx Clostridium Difficile, Traveled Outside the [place] in Last 30 Days





- Family History


Family History: FHx of CA.  No FHx of CVA





- Social History


Alcohol Use: None


Hx Substance Use: No


Substance Use Type: Reports: None


Hx Tobacco Use: No


Smoking Status (MU): Former Smoker


Type: Cigarettes


Amount Used/How Often: 1 pack per week


Length of Time of Smoking/Using Tobacco: 1 year


Have You Smoked in the Last Year: No





Review of Systems


Negative: Fever, Chills


Negative: Chest Pain


Positive: Shortness Of Breath, Cough


Negative: Edema


All Other Systems Reviewed And Are Negative: Yes





Physical Exam





- Summary


Physical Exam Summary: 








The patient is well-nourished and in no acute pain. Mild respiratory distress. 

Morbidly obese. Alert. Pleasant. 





The skin is warm and dry and skin color reflects adequate perfusion. Decreased 

skin turgor. 





HEENT:  The head is normocephalic and atraumatic. The pupils are equal and 

reactive. Injection in left eye with possible drainage. DMM. 





Neck is supple with full range of motion and non-tender. There are no carotid 

bruits.  There is no neck vein distension.





Respiratory: Chest is non-tender.  Crackles in bases and inspiratory wheezing.





Cardiovascular: Hear is regular rate and rhythm.  There is no murmur or rub 

auscultated.   There is no peripheral edema and pulses are symmetrical and 

equal.





Abdomen: The abdomen is soft and non-tender.  There are normal bowel sounds 

heard in all four quadrants and there is no organomegaly palpated.





Musculoskeletal: There is no back pain noted.  Extremities are non-tender with 

full range of motion.  There is good capillary refill.  Limited mobility. 





Neurological: Patient is alert and oriented to person, place and time.  The 

patient has symmetrical motor strength in all four extremities.  





Psychiatric: The patient has an appropriate affect and does not exhibit any 

anxiety or depression. 


Triage Information Reviewed: Yes


Vital Signs On Initial Exam: 


 Initial Vitals











BP


 


 97/29 


 


 06/21/17 11:45











Vital Signs Reviewed: Yes





- [place] Coma Scale


Coma Scale Total: 15





Diagnostics





- Vital Signs


 Vital Signs











  Temp Pulse Resp BP Pulse Ox


 


 06/21/17 12:28   75  18   100


 


 06/21/17 12:14      100


 


 06/21/17 12:00   70  18   100


 


 06/21/17 11:53   71  19  132/68  99


 


 06/21/17 11:46  97.6 F  70  15  132/68  100


 


 06/21/17 11:45     97/29 














- Laboratory


Lab Results: 


 Lab Results











  06/21/17 06/21/17 06/21/17 Range/Units





  12:58 12:58 12:58 


 


WBC  6.5    (3.5-10.8)  10^3/ul


 


RBC  4.62    (4.0-5.4)  10^6/ul


 


Hgb  11.7 L    (14.0-18.0)  g/dl


 


Hct  37 L    (42-52)  %


 


MCV  81    (80-94)  fL


 


MCH  25 L    (27-31)  pg


 


MCHC  31    (31-36)  g/dl


 


RDW  15    (10.5-15)  %


 


Plt Count  205    (150-450)  10^3/ul


 


MPV  8    (7.4-10.4)  um3


 


Neut % (Auto)  80.8    (38-83)  %


 


Lymph % (Auto)  11.3 L    (25-47)  %


 


Mono % (Auto)  4.0    (1-9)  %


 


Eos % (Auto)  2.5    (0-6)  %


 


Baso % (Auto)  1.4    (0-2)  %


 


Absolute Neuts (auto)  5.3    (1.5-7.7)  10^3/ul


 


Absolute Lymphs (auto)  0.7 L    (1.0-4.8)  10^3/ul


 


Absolute Monos (auto)  0.3    (0-0.8)  10^3/ul


 


Absolute Eos (auto)  0.2    (0-0.6)  10^3/ul


 


Absolute Basos (auto)  0.1    (0-0.2)  10^3/ul


 


Absolute Nucleated RBC  0.02    10^3/ul


 


Nucleated RBC %  0.3    


 


INR (Anticoag Therapy)     (0.89-1.11)  


 


Sodium   138   (133-145)  mmol/L


 


Potassium   4.2   (3.5-5.0)  mmol/L


 


Chloride   104   (101-111)  mmol/L


 


Carbon Dioxide   29   (22-32)  mmol/L


 


Anion Gap   5   (2-11)  mmol/L


 


BUN   38 H   (6-24)  mg/dL


 


Creatinine   2.85 H   (0.67-1.17)  mg/dL


 


Est GFR ( Amer)   28.5   (>60)  


 


Est GFR (Non-Af Amer)   22.2   (>60)  


 


BUN/Creatinine Ratio   13.3   (8-20)  


 


Glucose   90   ()  mg/dL


 


Lactic Acid    1.1  (0.5-2.0)  mmol/L


 


Calcium   9.0   (8.6-10.3)  mg/dL


 


Total Bilirubin   0.70   (0.2-1.0)  mg/dL


 


AST   12 L   (13-39)  U/L


 


ALT   7   (7-52)  U/L


 


Alkaline Phosphatase   87   ()  U/L


 


Troponin I   0.01   (<0.04)  ng/mL


 


C-Reactive Protein   25.97 H   (< 5.00)  mg/L


 


B-Natriuretic Peptide    ( - 100) pg/mL


 


Total Protein   7.1   (6.4-8.9)  g/dL


 


Albumin   3.4   (3.2-5.2)  g/dL


 


Globulin   3.7   (2-4)  g/dL


 


Albumin/Globulin Ratio   0.9 L   (1-3)  


 


Procalcitonin     (<0.6)  ng/mL














  06/21/17 06/21/17 06/21/17 Range/Units





  12:58 12:58 12:58 


 


WBC     (3.5-10.8)  10^3/ul


 


RBC     (4.0-5.4)  10^6/ul


 


Hgb     (14.0-18.0)  g/dl


 


Hct     (42-52)  %


 


MCV     (80-94)  fL


 


MCH     (27-31)  pg


 


MCHC     (31-36)  g/dl


 


RDW     (10.5-15)  %


 


Plt Count     (150-450)  10^3/ul


 


MPV     (7.4-10.4)  um3


 


Neut % (Auto)     (38-83)  %


 


Lymph % (Auto)     (25-47)  %


 


Mono % (Auto)     (1-9)  %


 


Eos % (Auto)     (0-6)  %


 


Baso % (Auto)     (0-2)  %


 


Absolute Neuts (auto)     (1.5-7.7)  10^3/ul


 


Absolute Lymphs (auto)     (1.0-4.8)  10^3/ul


 


Absolute Monos (auto)     (0-0.8)  10^3/ul


 


Absolute Eos (auto)     (0-0.6)  10^3/ul


 


Absolute Basos (auto)     (0-0.2)  10^3/ul


 


Absolute Nucleated RBC     10^3/ul


 


Nucleated RBC %     


 


INR (Anticoag Therapy)  3.13 H    (0.89-1.11)  


 


Sodium     (133-145)  mmol/L


 


Potassium     (3.5-5.0)  mmol/L


 


Chloride     (101-111)  mmol/L


 


Carbon Dioxide     (22-32)  mmol/L


 


Anion Gap     (2-11)  mmol/L


 


BUN     (6-24)  mg/dL


 


Creatinine     (0.67-1.17)  mg/dL


 


Est GFR ( Amer)     (>60)  


 


Est GFR (Non-Af Amer)     (>60)  


 


BUN/Creatinine Ratio     (8-20)  


 


Glucose     ()  mg/dL


 


Lactic Acid     (0.5-2.0)  mmol/L


 


Calcium     (8.6-10.3)  mg/dL


 


Total Bilirubin     (0.2-1.0)  mg/dL


 


AST     (13-39)  U/L


 


ALT     (7-52)  U/L


 


Alkaline Phosphatase     ()  U/L


 


Troponin I     (<0.04)  ng/mL


 


C-Reactive Protein     (< 5.00)  mg/L


 


B-Natriuretic Peptide   84  ( - 100) pg/mL


 


Total Protein     (6.4-8.9)  g/dL


 


Albumin     (3.2-5.2)  g/dL


 


Globulin     (2-4)  g/dL


 


Albumin/Globulin Ratio     (1-3)  


 


Procalcitonin    0.1  (<0.6)  ng/mL











Result Diagrams: 


 06/21/17 12:58





 06/21/17 12:58


Lab Statement: Any lab studies that have been ordered have been reviewed, and 

results considered in the medical decision making process.





- Radiology


  ** CXR


Radiology Interpretation Completed By: Radiologist - IMPRESSION:  Limited exam 

with suggestion of pulmonary vascular congestion and interstitial edema.





- EKG


  ** 1222


EKG Interpretation: Paced rhythm @ 70 bpm. 





Course/Dx





- Course


Course Of Treatment: 68 y/o M presents with sudden onset SOB since 0500 this 

morning. He reports a mild cough, but denies pedal edema, CP, fever, or chills. 

He states that he received two breathing treatments at his nursing home PTA. He 

received fluids and Duoneb in ED course. EKG is paced. CXR shows, per radiology

, Limited exam with suggestion of pulmonary vascular congestion and 

interstitial edema. Discussed case with Dr. Moe. Will admit.





- Diagnoses


Differential Diagnosis/HQI/PQRI: Positive: Bronchitis, CHF, MI, Pneumonia, 

Pulmonary Edema


Provider Diagnoses: 


 CHF (congestive heart failure), Acute dyspnea, A-fib, Renal failure








- Physician Notifications


Discussed Care of Patient With: Keren Moe


Time Discussed With Above Provider: 14:24


Instructed by Provider To: Admit As Inpatient


Admit/Transition Orders Completed By ED Provider: Yes





- Critical Care Time


Critical Care Time: 30-74 min - 30 minutes





Discharge





- Discharge Plan


Condition: Stable


Disposition: ADMITTED TO [facility]





The documentation as recorded by the Miguel padilla Salem accurately reflects 

the service I personally performed and the decisions made by me, Christiano Madison MD.

## 2017-06-21 NOTE — RAD
Indication: Shortness of breath, pneumonia, CHF.



Comparison: May 03, 2017 CT abdomen. April 30, 2017 chest radiograph.



Technique: Semiupright AP portable chest 1245 hours



Report: Morbid obesity and leftward rotation limits image quality.



Suggestion of cardiomegaly without change. Pacemaker lead extends the level of the RIGHT

ventricle. Probable associated LEFT basilar atelectasis. Prominent ill-defined central

pulmonary vasculature. Diffuse prominence of the interstitial markings. Small LEFT pleural

effusion not excluded. Negative for pneumothorax.



IMPRESSION:  Limited exam with suggestion of pulmonary vascular congestion and

interstitial edema.

## 2017-06-22 LAB
ANION GAP SERPL CALC-SCNC: 6 MMOL/L (ref 2–11)
BUN SERPL-MCNC: 41 MG/DL (ref 6–24)
BUN/CREAT SERPL: 13.9 (ref 8–20)
CALCIUM SERPL-MCNC: 8.7 MG/DL (ref 8.6–10.3)
CHLORIDE SERPL-SCNC: 104 MMOL/L (ref 101–111)
GLUCOSE SERPL-MCNC: 82 MG/DL (ref 70–100)
HCO3 SERPL-SCNC: 26 MMOL/L (ref 22–32)
HCT VFR BLD AUTO: 34 % (ref 42–52)
HGB BLD-MCNC: 10.7 G/DL (ref 14–18)
MCH RBC QN AUTO: 26 PG (ref 27–31)
MCHC RBC AUTO-ENTMCNC: 32 G/DL (ref 31–36)
MCV RBC AUTO: 80 FL (ref 80–94)
POTASSIUM SERPL-SCNC: 4.2 MMOL/L (ref 3.5–5)
RBC # BLD AUTO: 4.21 10^6/UL (ref 4–5.4)
SODIUM SERPL-SCNC: 136 MMOL/L (ref 133–145)
WBC # BLD AUTO: 6.2 10^3/UL (ref 3.5–10.8)

## 2017-06-22 RX ADMIN — DOCUSATE SODIUM SCH MG: 100 CAPSULE, LIQUID FILLED ORAL at 08:39

## 2017-06-22 RX ADMIN — FUROSEMIDE SCH MG: 10 INJECTION, SOLUTION INTRAMUSCULAR; INTRAVENOUS at 16:38

## 2017-06-22 RX ADMIN — TAMSULOSIN HYDROCHLORIDE SCH MG: 0.4 CAPSULE ORAL at 20:47

## 2017-06-22 RX ADMIN — MOMETASONE FUROATE AND FORMOTEROL FUMARATE DIHYDRATE SCH: 200; 5 AEROSOL RESPIRATORY (INHALATION) at 20:26

## 2017-06-22 RX ADMIN — IPRATROPIUM BROMIDE AND ALBUTEROL SULFATE SCH: .5; 3 SOLUTION RESPIRATORY (INHALATION) at 09:00

## 2017-06-22 RX ADMIN — MAGNESIUM HYDROXIDE PRN ML: 400 SUSPENSION ORAL at 20:47

## 2017-06-22 RX ADMIN — IPRATROPIUM BROMIDE AND ALBUTEROL SULFATE SCH: .5; 3 SOLUTION RESPIRATORY (INHALATION) at 03:20

## 2017-06-22 RX ADMIN — MOMETASONE FUROATE AND FORMOTEROL FUMARATE DIHYDRATE SCH: 200; 5 AEROSOL RESPIRATORY (INHALATION) at 09:07

## 2017-06-22 RX ADMIN — SENNOSIDES SCH TAB: 8.6 TABLET, FILM COATED ORAL at 08:39

## 2017-06-22 RX ADMIN — ASPIRIN SCH MG: 81 TABLET, COATED ORAL at 08:39

## 2017-06-22 NOTE — PN
Subjective


Date of Service: 06/22/17


Interval History: 





pt feels a little better today. Still SOB.





Objective


Active Medications: 








Acetaminophen (Tylenol Tab*)  650 mg PO Q4H PRN


   PRN Reason: FEVER/PAIN


Albuterol (Ventolin 2.5 Mg/3 Ml Neb.Sol*)  2.5 mg INH Q2H PRN


   PRN Reason: SOB/WHEEZING


   Last Admin: 06/22/17 10:10 Dose:  2.5 mg


Aspirin (Aspirin Ec Low Dose*)  81 mg PO DAILY [facility]


   Last Admin: 06/22/17 08:39 Dose:  81 mg


Docusate Sodium (Colace Cap*)  100 mg PO DAILY [facility]


   Last Admin: 06/22/17 08:39 Dose:  100 mg


Furosemide (Lasix Iv*)  40 mg IV SLOW PU 0800,1700 [facility]


Magnesium Hydroxide (Milk Of Magnesia Liq*)  30 ml PO DAILY PRN


   PRN Reason: CONSTIPATION


   Last Admin: 06/21/17 19:00 Dose:  30 ml


Mometasone Furoate/Formoterol Fumar (Dulera 200/5 Mdi*)  2 puff INH BID [facility]


   Last Admin: 06/22/17 09:07 Dose:  Not Given


Ondansetron HCl (Zofran Inj*)  4 mg IV Q6H PRN


   PRN Reason: NAUSEA


Senna (Senokot Tab*)  2 tab PO DAILY [facility]


   Last Admin: 06/22/17 08:39 Dose:  2 tab


Tamsulosin HCl (Flomax Cap*)  0.4 mg PO BEDTIME [facility]


   Last Admin: 06/21/17 20:40 Dose:  0.4 mg


Warfarin Sodium (Coumadin Tab(*))  4 mg PO 1700 [facility]


   PRN Reason: Protocol








 Vital Signs











  06/21/17 06/21/17 06/21/17





  15:00 15:30 15:46


 


Temperature   97.6 F


 


Pulse Rate 70 70 76


 


Respiratory 19 21 18





Rate   


 


Blood Pressure 123/79 121/73 142/110





(mmHg)   


 


O2 Sat by Pulse 100 100 98





Oximetry   














  06/21/17 06/21/17 06/21/17





  19:17 19:23 19:47


 


Temperature   97.8 F


 


Pulse Rate 78  73


 


Respiratory   24





Rate   


 


Blood Pressure   119/61





(mmHg)   


 


O2 Sat by Pulse 96 97 97





Oximetry   














  06/21/17 06/21/17 06/22/17





  19:50 23:35 03:09


 


Temperature 97.8 F 98.3 F 97.1 F


 


Pulse Rate 73 69 69


 


Respiratory 24 20 20





Rate   


 


Blood Pressure 119/61 110/51 92/46





(mmHg)   


 


O2 Sat by Pulse 97 97 98





Oximetry   














  06/22/17





  07:55


 


Temperature 97.8 F


 


Pulse Rate 70


 


Respiratory 18





Rate 


 


Blood Pressure 106/62





(mmHg) 


 


O2 Sat by Pulse 97





Oximetry 











Oxygen Devices in Use Now: Nasal Cannula - at 3 L


Appearance: 66 yo M in nAD, aAOx3


Eyes: No Scleral Icterus, PERRLA


Ears/Nose/Mouth/Throat: NL Teeth, Lips, Gums, Mucous Membranes Moist


Neck: NL Appearance and Movements; NL JVP, Trachea Midline


Respiratory: Symmetrical Chest Expansion and Respiratory Effort, Clear to 

Auscultation


Cardiovascular: NL Sounds; No Murmurs; No JVD, RRR


Abdominal: NL Sounds; No Tenderness; No Distention


Lymphatic: No Cervical Adenopathy


Extremities: No Clubbing, Cyanosis, - - trace pedal edema b/l


Skin: No Rash or Ulcers, No Nodules or Sclerosis


Neurological: Alert and Oriented x 3, NL Muscle Strength and Tone


Result Diagrams: 


 06/22/17 06:29





 06/22/17 06:29


Additional Lab and Data: 


 Lab Results











  06/21/17 06/21/17 06/21/17 Range/Units





  12:58 12:58 12:58 


 


WBC  6.5    (3.5-10.8)  10^3/ul


 


RBC  4.62    (4.0-5.4)  10^6/ul


 


Hgb  11.7 L    (14.0-18.0)  g/dl


 


Hct  37 L    (42-52)  %


 


MCV  81    (80-94)  fL


 


MCH  25 L    (27-31)  pg


 


MCHC  31    (31-36)  g/dl


 


RDW  15    (10.5-15)  %


 


Plt Count  205    (150-450)  10^3/ul


 


MPV  8    (7.4-10.4)  um3


 


Neut % (Auto)  80.8    (38-83)  %


 


Lymph % (Auto)  11.3 L    (25-47)  %


 


Mono % (Auto)  4.0    (1-9)  %


 


Eos % (Auto)  2.5    (0-6)  %


 


Baso % (Auto)  1.4    (0-2)  %


 


Absolute Neuts (auto)  5.3    (1.5-7.7)  10^3/ul


 


Absolute Lymphs (auto)  0.7 L    (1.0-4.8)  10^3/ul


 


Absolute Monos (auto)  0.3    (0-0.8)  10^3/ul


 


Absolute Eos (auto)  0.2    (0-0.6)  10^3/ul


 


Absolute Basos (auto)  0.1    (0-0.2)  10^3/ul


 


Absolute Nucleated RBC  0.02    10^3/ul


 


Nucleated RBC %  0.3    


 


INR (Anticoag Therapy)     (0.89-1.11)  


 


Sodium   138   (133-145)  mmol/L


 


Potassium   4.2   (3.5-5.0)  mmol/L


 


Chloride   104   (101-111)  mmol/L


 


Carbon Dioxide   29   (22-32)  mmol/L


 


Anion Gap   5   (2-11)  mmol/L


 


BUN   38 H   (6-24)  mg/dL


 


Creatinine   2.85 H   (0.67-1.17)  mg/dL


 


Est GFR ( Amer)   28.5   (>60)  


 


Est GFR (Non-Af Amer)   22.2   (>60)  


 


BUN/Creatinine Ratio   13.3   (8-20)  


 


Glucose   90   ()  mg/dL


 


Lactic Acid    1.1  (0.5-2.0)  mmol/L


 


Calcium   9.0   (8.6-10.3)  mg/dL


 


Total Bilirubin   0.70   (0.2-1.0)  mg/dL


 


AST   12 L   (13-39)  U/L


 


ALT   7   (7-52)  U/L


 


Alkaline Phosphatase   87   ()  U/L


 


Troponin I   0.01   (<0.04)  ng/mL


 


C-Reactive Protein   25.97 H   (< 5.00)  mg/L


 


B-Natriuretic Peptide    ( - 100) pg/mL


 


Total Protein   7.1   (6.4-8.9)  g/dL


 


Albumin   3.4   (3.2-5.2)  g/dL


 


Globulin   3.7   (2-4)  g/dL


 


Albumin/Globulin Ratio   0.9 L   (1-3)  


 


Procalcitonin     (<0.6)  ng/mL














  06/21/17 06/21/17 06/21/17 Range/Units





  12:58 12:58 12:58 


 


WBC     (3.5-10.8)  10^3/ul


 


RBC     (4.0-5.4)  10^6/ul


 


Hgb     (14.0-18.0)  g/dl


 


Hct     (42-52)  %


 


MCV     (80-94)  fL


 


MCH     (27-31)  pg


 


MCHC     (31-36)  g/dl


 


RDW     (10.5-15)  %


 


Plt Count     (150-450)  10^3/ul


 


MPV     (7.4-10.4)  um3


 


Neut % (Auto)     (38-83)  %


 


Lymph % (Auto)     (25-47)  %


 


Mono % (Auto)     (1-9)  %


 


Eos % (Auto)     (0-6)  %


 


Baso % (Auto)     (0-2)  %


 


Absolute Neuts (auto)     (1.5-7.7)  10^3/ul


 


Absolute Lymphs (auto)     (1.0-4.8)  10^3/ul


 


Absolute Monos (auto)     (0-0.8)  10^3/ul


 


Absolute Eos (auto)     (0-0.6)  10^3/ul


 


Absolute Basos (auto)     (0-0.2)  10^3/ul


 


Absolute Nucleated RBC     10^3/ul


 


Nucleated RBC %     


 


INR (Anticoag Therapy)  3.13 H    (0.89-1.11)  


 


Sodium     (133-145)  mmol/L


 


Potassium     (3.5-5.0)  mmol/L


 


Chloride     (101-111)  mmol/L


 


Carbon Dioxide     (22-32)  mmol/L


 


Anion Gap     (2-11)  mmol/L


 


BUN     (6-24)  mg/dL


 


Creatinine     (0.67-1.17)  mg/dL


 


Est GFR ( Amer)     (>60)  


 


Est GFR (Non-Af Amer)     (>60)  


 


BUN/Creatinine Ratio     (8-20)  


 


Glucose     ()  mg/dL


 


Lactic Acid     (0.5-2.0)  mmol/L


 


Calcium     (8.6-10.3)  mg/dL


 


Total Bilirubin     (0.2-1.0)  mg/dL


 


AST     (13-39)  U/L


 


ALT     (7-52)  U/L


 


Alkaline Phosphatase     ()  U/L


 


Troponin I     (<0.04)  ng/mL


 


C-Reactive Protein     (< 5.00)  mg/L


 


B-Natriuretic Peptide   84  ( - 100) pg/mL


 


Total Protein     (6.4-8.9)  g/dL


 


Albumin     (3.2-5.2)  g/dL


 


Globulin     (2-4)  g/dL


 


Albumin/Globulin Ratio     (1-3)  


 


Procalcitonin    0.1  (<0.6)  ng/mL














Assess/Plan/Problems-Billing


Assessment: 66 yo M with h/o obesity, KIM, COPD, a. fib, pacer, CVA ( on BIPAP 

at night, 2 L 02 Nc during the day) was tx with Cipro and then with Levaquin (

from 6/16 to 6/22), also on Lasix prn SOB,(but got only a couple of doses on 6/ 16/17)











- Patient Problems


(1) Acute on chronic systolic (congestive) heart failure


Comment: Echo 7/16 = EF was 45%


Continue lasix


Oxygen continuously 


   





(2) Chronic respiratory failure


Comment: on 02 at 2 L at NH, today still on 3 L   





(3) CKD (chronic kidney disease) stage 3, GFR 30-59 ml/min


Comment: 


Creatinine stable. 


Continue to monitor.


    





(4) KIM (obstructive sleep apnea)


Comment: - BiPAP 16/8.   





(5) Atrial fibrillation


Comment: Heart rate adequately controlled.


In a paced rhythm on tlem  


Chronic AF with previous cardioembolic stroke in 11/15/2016.





   





(6) DVT prophylaxis


Comment: INR at 3.4, coumadin held on 67/21/17   


Status and Disposition: 


OBV placed on inpatient due to need for further diuresis

## 2017-06-23 VITALS — SYSTOLIC BLOOD PRESSURE: 137 MMHG | DIASTOLIC BLOOD PRESSURE: 61 MMHG

## 2017-06-23 LAB
ANION GAP SERPL CALC-SCNC: 5 MMOL/L (ref 2–11)
BUN SERPL-MCNC: 45 MG/DL (ref 6–24)
BUN/CREAT SERPL: 14.3 (ref 8–20)
CALCIUM SERPL-MCNC: 8.6 MG/DL (ref 8.6–10.3)
CHLORIDE SERPL-SCNC: 101 MMOL/L (ref 101–111)
GLUCOSE SERPL-MCNC: 98 MG/DL (ref 70–100)
HCO3 SERPL-SCNC: 32 MMOL/L (ref 22–32)
POTASSIUM SERPL-SCNC: 3.9 MMOL/L (ref 3.5–5)
SODIUM SERPL-SCNC: 138 MMOL/L (ref 133–145)

## 2017-06-23 RX ADMIN — MOMETASONE FUROATE AND FORMOTEROL FUMARATE DIHYDRATE SCH PUFF: 200; 5 AEROSOL RESPIRATORY (INHALATION) at 07:55

## 2017-06-23 RX ADMIN — ASPIRIN SCH MG: 81 TABLET, COATED ORAL at 09:50

## 2017-06-23 RX ADMIN — DOCUSATE SODIUM SCH MG: 100 CAPSULE, LIQUID FILLED ORAL at 09:50

## 2017-06-23 RX ADMIN — SENNOSIDES SCH TAB: 8.6 TABLET, FILM COATED ORAL at 09:50

## 2017-06-23 RX ADMIN — FUROSEMIDE SCH MG: 10 INJECTION, SOLUTION INTRAMUSCULAR; INTRAVENOUS at 09:50

## 2017-06-23 NOTE — DS
DATE OF ADMISSION:  06/21/2017.

 

DATE OF DISCHARGE:  06/23/2017.

 

PRIMARY DIAGNOSIS:  Acute systolic heart failure exacerbation.

 

SECONDARY DIAGNOSES:

1.  History of CVA.

2.  Atrial fibrillation.

3.  Obstructive sleep apnea.

4.  Hypertension.

5.  Benign prostatic hypertrophy.

6.  Recent history of urinary retention.

 

MEDICATIONS ON DISCHARGE:

1.  Milk of Magnesia 30 ml daily as needed.

2.  Acetaminophen 650 mg four times daily as needed for pain or fever.

3.  Senna/Docusate one tab daily.

4.  Coumadin 4 mg in the evening.

5.  Flomax 0.4 mg at bedtime.

6.  Dulera 200/5 MDI two puffs twice daily.

7.  Cholecalciferol 2000 units daily.

8.  Ipratropium nebulizer inhaled three times a day as needed for shortness of 
breath or wheeze.

9.  Aspirin 81 mg daily.

10. Potassium Chloride 20 mEq daily.

11. Furosemide 20 mEq daily.

 

PERTINENT IMAGING PERFORMED DURING HOSPITAL STAY:  Chest x-ray:  Impression: 
Limited exam with suggestion of pulmonary vasculature congestion and 
interstitial edema.

 

PERTINENT LABORATORY DATA:  Creatinine on presentation 2.8, on discharge 3.1.  
INR on the day of discharge 2.1.

 

HISTORY OF PRESENT ILLNESS AND HOSPITAL COURSE:  This is a 67-year-old man with 
a past medical history as outlined in the history of present illness on the day 
of admission, recently hospital stay in May after a TIA or stroke, known 
systolic ejection fraction of 45 to 50 percent, who presented to the hospital 
after waking up early in the morning acutely short of breath.  It sounded very 
similar to paroxysmal nocturnal dyspnea.  He received two nebulizers at 
[facility] and was transferred to [facility].  The patient reports he 
has gained 20 to 30 pounds over the course of the last two to three months.  
Review of weights from [facility] dating back to the end of April 
indicated a discharge weight of 192 kg on April 30th.  His presentation weight 
to [facility] on the 21st of June was 208 kg, however decreased to 
199 on June 22nd.  On the day of discharge he was 201.4 kg.  Suspect his dry 
weight is around 200 kg and the 208 was an outlier.  Additionally, the patient 
had a weight on April 30th that was 198 kg, which may indicate that 192 was 
also an outlier.  In either case, history for CHF exacerbation with IV Lasix 
with vigorous diuresis.  No postvoid residual checked on the day of discharge.  
The patient was taking Lasix prn and not taken for several prior to discharge.  
He will be discharged on standing Lasix 40 mg daily.  On the day of discharge, 
he was on his home oxygen 2 liters with saturation in the high 90s, 97 to 98.  
He had no shortness of breath.

 

AT FOLLOW-UP, PLEASE:

1.  Follow-up BMP for continued stability of creatinine on increased dose of 
Lasix.

2.  Follow INR as you do usually.

3.  Adjust Lasix up or down based on volume status.

4.  No other specific labs or vitals that need follow-up.

 

REASONS TO RETURN TO THE HOSPITAL:  Including, but not limited to recurrent or 
worsening symptoms, chest pain, shortness of breath, nausea, vomiting, 
lightheadedness, loss of consciousness, near loss of consciousness, inability 
to obtain or tolerate his medications, or bleeding from any source were 
discussed with the patient, he acknowledged understanding.

 

Greater than 45 minutes were spent on the discharge of this patient, greater 
than half were spent face-to-face with the patient.

 

 [id number]/[facility] #: [id number]

NICOLAS

## 2018-02-27 ENCOUNTER — HOSPITAL ENCOUNTER (EMERGENCY)
Dept: HOSPITAL 25 - ED | Age: 68
Discharge: ANOTHER HOSPITAL | End: 2018-02-27
Payer: MEDICARE

## 2018-02-27 VITALS — SYSTOLIC BLOOD PRESSURE: 139 MMHG | DIASTOLIC BLOOD PRESSURE: 82 MMHG

## 2018-02-27 DIAGNOSIS — J18.9: ICD-10-CM

## 2018-02-27 DIAGNOSIS — E66.01: ICD-10-CM

## 2018-02-27 DIAGNOSIS — R29.710: ICD-10-CM

## 2018-02-27 DIAGNOSIS — R29.810: ICD-10-CM

## 2018-02-27 DIAGNOSIS — Z87.891: ICD-10-CM

## 2018-02-27 DIAGNOSIS — R47.81: ICD-10-CM

## 2018-02-27 DIAGNOSIS — I63.9: ICD-10-CM

## 2018-02-27 DIAGNOSIS — G81.91: Primary | ICD-10-CM

## 2018-02-27 LAB
BASOPHILS # BLD AUTO: 0.1 10^3/UL (ref 0–0.2)
EOSINOPHIL # BLD AUTO: 0.2 10^3/UL (ref 0–0.6)
HCT VFR BLD AUTO: 38 % (ref 42–52)
HGB BLD-MCNC: 11.7 G/DL (ref 14–18)
INR PPP/BLD: 2.15 (ref 0.77–1.02)
LYMPHOCYTES # BLD AUTO: 0.7 10^3/UL (ref 1–4.8)
MCH RBC QN AUTO: 26 PG (ref 27–31)
MCHC RBC AUTO-ENTMCNC: 31 G/DL (ref 31–36)
MCV RBC AUTO: 82 FL (ref 80–94)
MONOCYTES # BLD AUTO: 0.4 10^3/UL (ref 0–0.8)
NEUTROPHILS # BLD AUTO: 4.5 10^3/UL (ref 1.5–7.7)
NRBC # BLD AUTO: 0 10^3/UL
NRBC BLD QL AUTO: 0.1
PLATELET # BLD AUTO: 177 10^3/UL (ref 150–450)
RBC # BLD AUTO: 4.57 10^6/UL (ref 4–5.4)
WBC # BLD AUTO: 5.8 10^3/UL (ref 3.5–10.8)

## 2018-02-27 PROCEDURE — 70450 CT HEAD/BRAIN W/O DYE: CPT

## 2018-02-27 PROCEDURE — 85610 PROTHROMBIN TIME: CPT

## 2018-02-27 PROCEDURE — 80061 LIPID PANEL: CPT

## 2018-02-27 PROCEDURE — 85025 COMPLETE CBC W/AUTO DIFF WBC: CPT

## 2018-02-27 PROCEDURE — 96365 THER/PROPH/DIAG IV INF INIT: CPT

## 2018-02-27 PROCEDURE — 80053 COMPREHEN METABOLIC PANEL: CPT

## 2018-02-27 PROCEDURE — 93005 ELECTROCARDIOGRAM TRACING: CPT

## 2018-02-27 PROCEDURE — 71045 X-RAY EXAM CHEST 1 VIEW: CPT

## 2018-02-27 PROCEDURE — 36415 COLL VENOUS BLD VENIPUNCTURE: CPT

## 2018-02-27 PROCEDURE — 99285 EMERGENCY DEPT VISIT HI MDM: CPT

## 2018-02-27 PROCEDURE — 84484 ASSAY OF TROPONIN QUANT: CPT

## 2018-02-27 PROCEDURE — 83605 ASSAY OF LACTIC ACID: CPT

## 2018-02-27 PROCEDURE — 85730 THROMBOPLASTIN TIME PARTIAL: CPT

## 2018-02-27 NOTE — RAD
HISTORY: Neurological changes, code grey



COMPARISONS: June 21, 2017



VIEWS: 2: frontal portable view of the chest at 3:31 AM. The study is technically limited.

The left costophrenic angle is not well visualized. The patient is obliqued to the left.



FINDINGS:

LINES AND TUBES: A left-sided pacemaker is noted.

CARDIOMEDIASTINAL SILHOUETTE: The cardiac silhouette is enlarged. The cardiomediastinal

silhouette is otherwise normal for portable technique.

PLEURA: There is a moderate left pleural effusion.

LUNG PARENCHYMA: There is a diffuse reticular pattern with indistinct pulmonary vessels.

ABDOMEN: The upper abdomen is clear. There is no subphrenic gas.

BONES AND SOFT TISSUES: No bone or soft tissue abnormalities are noted.



IMPRESSION: 

1.  LIMITED STUDY.

2.  CARDIOMEGALY.

3.  PULMONARY INTERSTITIAL EDEMA.

4.  LEFT PLEURAL EFFUSION.

## 2018-02-27 NOTE — ED
Mikala FONTAINE Rebecca, scribed for Puja Flowers MD on 02/27/18 at 0302 .





Neurological HPI





- HPI Summary


HPI Summary: 


Pt is a 69 y/o M BIBA who presents to ED due to R-sided weakness. EMS report 

that his last seen normal is unknown, as last shift change at his nursing home 

was at 2200 and he was not checked on during the change. At 0130 during rounds, 

staff noticed that while talking to the pt, he was not responding, prompting 

the EMS call. EMS report his baseline is talkative, alert, oriented, non-

ambulatory and bed-bound. Per EMS, while en route to [facility] he was able to squeeze 

their fingers though his right hand  was weaker than the left and he was 

unable to lift his right arm. Additionally note R-sided facial droop. Sx 

aggravated and alleviated by nothing. When asked, pt is unsure of onset of 

symptoms, though he states he has eaten in the last few hours. States his right 

side has been week for "a couple days." 











- History of Current Complaint


Chief Complaint: EDNeurologicalDeficit


Stated Complaint: STROKE LIKE SYMPTOMS


Time Seen by Provider: 02/27/18 02:38


Hx Obtained From: Patient, EMS


Hx From Patient Unobtainable Due To: Other - Decreased responsiveness


Onset/Duration: Still Present


Character: Weak, Responsiveness - decreased


Aggravating: Nothing


Alleviating: Nothing


Associated Signs and Symptoms: Positive: Weakness





- Additional Pertinent History


Primary Care Physician: [id number]





- Allergy/Home Medications


Allergies/Adverse Reactions: 


 Allergies











Allergy/AdvReac Type Severity Reaction Status Date / Time


 


No Known Allergies Allergy   Verified 11/20/15 08:23














PMH/Surg Hx/FS Hx/Imm Hx


Cardiovascular History: Reports: Hx Congestive Heart Failure, Hx Hypertension, 

Hx Pacemaker/ICD


Respiratory History: Reports: Hx Sleep Apnea


   Denies: Hx Asthma, Hx Chronic Obstructive Pulmonary Disease (COPD)


 History: Reports: Hx Benign Prostatic Hyperplasia, Hx Chronic Renal Failure, 

Hx Kidney Infection, Other  Problems/Disorders - history MRSA in urine


Musculoskeletal History: Reports: Hx Arthritis, Other Musculoskeletal History - 

morbid obesity


Sensory History: 


   Denies: Hx Contacts or Glasses, Hx Hearing Aid


Opthamlomology History: 


   Denies: Hx Contacts or Glasses


Neurological History: Reports: Hx CVA


Psychiatric History: 


   Denies: Hx Panic Disorder





- Surgical History


Surgery Procedure, Year, and Place: HERNIA REPAIR, AICD/PACER


Hx Anesthesia Reactions: No





- Immunization History


Date of Tetanus Vaccine: up to date


Date of Influenza Vaccine: 2016


Infectious Disease History: Reports: Hx of Known/Suspected MRSA - mrsa neg as 

of f4/30/17


   Denies: Hx Clostridium Difficile





- Family History


Family History: FHx of CA.  No FHx of CVA





- Social History


Alcohol Use: None


Hx Substance Use: No


Substance Use Type: Reports: None


Hx Tobacco Use: No


Smoking Status (MU): Former Smoker


Type: Cigarettes


Amount Used/How Often: 1 pack per week


Length of Time of Smoking/Using Tobacco: 1 year


Have You Smoked in the Last Year: No





Review of Systems


Positive: Other - decreased responsiveness


Neurological: Other - R-sided facial droop


Positive: Weakness - R-sided


All Other Systems Reviewed And Are Negative: Yes





Physical Exam





- Summary


Physical Exam Summary: 


VITAL SIGNS: Reviewed.


GENERAL: ~Patient is a morbidly obese male who is lying comfortable in the 

stretcher. Patient is not in any acute respiratory distress.


HEAD AND FACE: No signs of trauma. No ecchymosis, hematomas or skull 

depressions. No sinus tenderness. Right facial droop. 


EYES: PERRLA, EOMI x 2, No injected conjunctiva, no nystagmus.


EARS: Hearing grossly intact. Ear canals and tympanic membranes are within 

normal limits.


MOUTH: Oropharynx within normal limits. 


NECK: Supple, trachea is midline, no adenopathy, no JVD, no carotid bruit, no c-

spine tenderness, neck with full ROM.


CHEST: Heart sounds are distant, no tenderness at palpation


LUNGS: Clear to auscultation bilaterally. No wheezing or crackles.


CVS: Regular rate and rhythm, heart sounds are distant, no murmurs or gallops 

appreciated.


ABDOMEN: Soft, non-tender. No signs of distention. No rebound no guarding, and 

no masses palpated. Bowel sounds are normal.


EXTREMITIES: FROM in all major joints, no edema, no cyanosis or clubbing. RUE 

is 1/5, RLE is 1-2/5. 


NEURO: Alert and oriented x 3. Slurred speech. Does not remember when he last 

felt normal. Right facial droop. 


SKIN: Dry and warm


GCS: 15


Triage Information Reviewed: Yes


Vital Signs Reviewed: Yes





Diagnostics





- Laboratory


Lab Statement: Any lab studies that have been ordered have been reviewed, and 

results considered in the medical decision making process.





- Radiology


  ** CXR


Xray Interpretation: Positive (See Comments) - Poor quality CXR, possible right 

lower lobe infiltrate


Radiology Interpretation Completed By: ED Physician





- CT


  ** Brain CT


CT Interpretation Completed By: Radiologist - Fluid in the left mastoid air 

cells suggests inflammation. ED physician reviewed this radiology report.





- EKG


  ** 0308


Cardiac Rate: NL - 75 bpm


EKG Interpretation: Paced rhythm at 75 bpm with an underlying rhythm of A Fib





NIH Scale





- NIH Scale


Level of Consciousness: Alert/Keenly Responsive


Ask Patient the Month and His/Her Age: Both Correct


Ask Pt to Open/Close Eyes and /Release Non-Paretic Hand: Both Correctly


Best Gaze (Only Horizontal Eye Movement): Normal


Visual Field Testing: No Visual Loss


Facial Paresis-Pt to Smile & Close Eyes or Grimace Symmetry: Complete Paralysis


Motor Function - Right Arm: No Effort Against Gravity


Motor Function - Left Arm: No Drift-Holds 10 Seconds


Motor Function - Right Leg: No Effort Against Gravity


Motor Function - Left Leg: No Drift-Holds 10 Seconds


Limb Ataxia-Must be out of Proportion to Weakness Present: Absent


Sensory (Use Pinprick to Test Arms/Legs/Trunk/Face): Normal


Best Language (Describe Picture, Name Items): No Aphasia


Dysarthria (Read Several Words): Slurs Some Words


Extinction and Inattention: No Abnormality


Total Score: 10





Course/Dx





- Course


Assessment/Plan: Pt is a 69 y/o M BIBA who presents to ED due to R-sided 

weakness. EMS report that his last seen normal is unknown, as last shift change 

at his nursing home was at 2200 and he was not checked on during the change. At 

0130 during rounds, staff noticed that while talking to the pt, he was not 

responding, prompting the EMS call. EMS report his baseline is talkative, alert

, oriented, non-ambulatory and bed-bound. Per EMS, while en route to [facility] he was 

able to squeeze their fingers though his right hand  was weaker than the 

left and he was unable to lift his right arm. Additionally note R-sided facial 

droop. Sx aggravated and alleviated by nothing. When asked, pt is unsure of 

onset of symptoms, though he states he has eaten in the last few hours. States 

his right side has been week for "a couple days." Code grey called at 0236. 

Brain CT reveals fluid in the left mastoid air cells suggests inflammation. EKG 

is paced rhythm. CXR reveals possible right lower lobe infiltrate.  Discussed 

care with [facility], requesting to speak with the neurologist on 

call. Transfer center states that they will call back. Discussed with the 

radiologists on call at 0324, disclosing the Brain CT results.Discussed care of 

pt with Dr. Champion from [facility] at 0335 who recommended he get to [facility] 

ED for a possible embelectomy via helicopter if possible. Pt will be 

transferred to [facility] with Dx of left CVA, PNA and morbid obesity.





- Diagnoses


Provider Diagnoses: 


 Left-sided cerebrovascular accident (CVA), PNA (pneumonia), Morbid obesity





During the Visit The Following Alert/Code Occurred: Code Grey - 0236





- Physician Notifications


Discussed Care Of Patient With: [facility]


Time Discussed With Above Provider: 03:11


Instructed by Provider To: Other - Requesting to speak with the neurologist on 

call. Transfer center states that they will call back. Discussed with the 

radiologists on call at 0324, disclosing the Brain CT results.Discussed care of 

pt with Dr. Champion from [facility] at 0335 who recommended he get to [facility] 

ED for a possible embelectomy a.s.a.p.





- Critical Care Time


Critical Care Time: 30-74 min - 45 minutes





Discharge





- Discharge Plan


Condition: Critical


Disposition: TRANS HIGHER LVL OF CARE FAC


Referrals: 


Maghaydah,Qutaybeh, MD [Primary Care Provider] - 





The documentation as recorded by the Mikala padilla Rebecca accurately 

reflects the service I personally performed and the decisions made by me, Puja Flowers MD.

## 2018-02-27 NOTE — RAD
HISTORY: Neurological changes



COMPARISONS: April 30, 2017



TECHNIQUE: Multiple contiguous axial CT scans were obtained of the head without 

intravenous contrast. 



FINDINGS: 

The study is technically limited secondary patient body habitus and is also limited by

patient motion artifact.



HEMORRHAGE/INFARCT: There is no hemorrhage or acute infarct.

MASSES/SHIFT: There is no mass or shift.



EXTRA-AXIAL SPACES: There are no extra-axial fluid collections.

SULCI AND VENTRICLES: The sulci and ventricles are normal in size and position for the

patient's stated age.



CEREBRUM: There are no focal parenchymal abnormalities.

BRAINSTEM: There are no focal parenchymal abnormalities.

CEREBELLUM: There are no focal parenchymal abnormalities.



VESSELS: The vessels are grossly normal.

PARANASAL SINUSES: The paranasal sinuses are clear. There is left mastoid effusion.

ORBITS: The orbits are unremarkable.

BONES AND SOFT TISSUE: No bone or soft tissue abnormalities are noted.



OTHER: None



IMPRESSION: 

LIMITED STUDY. WITHIN THE LIMITATIONS OF STUDY, THERE IS NO ACUTE INTRACRANIAL PATHOLOGY.

LEFT MASTOID EFFUSION.



PRELIMINARY FINDINGS WERE DISCUSSED WITH DR. YANG BY DR. OSORIO AT APPROXIMATELY 3:24 AM

ON FEBRUARY 27, 2018..

## 2018-03-28 ENCOUNTER — HOSPITAL ENCOUNTER (INPATIENT)
Dept: HOSPITAL 25 - ED | Age: 68
LOS: 2 days | Discharge: NURSING, REHAB OR LONG-TERM CARE | DRG: 291 | End: 2018-03-30
Attending: INTERNAL MEDICINE | Admitting: HOSPITALIST
Payer: MEDICARE

## 2018-03-28 DIAGNOSIS — N17.9: ICD-10-CM

## 2018-03-28 DIAGNOSIS — Z80.1: ICD-10-CM

## 2018-03-28 DIAGNOSIS — I50.23: Primary | ICD-10-CM

## 2018-03-28 DIAGNOSIS — Z79.1: ICD-10-CM

## 2018-03-28 DIAGNOSIS — Z80.0: ICD-10-CM

## 2018-03-28 DIAGNOSIS — Z79.891: ICD-10-CM

## 2018-03-28 DIAGNOSIS — Z79.82: ICD-10-CM

## 2018-03-28 DIAGNOSIS — Z87.891: ICD-10-CM

## 2018-03-28 DIAGNOSIS — Z95.810: ICD-10-CM

## 2018-03-28 DIAGNOSIS — R33.8: ICD-10-CM

## 2018-03-28 DIAGNOSIS — N18.9: ICD-10-CM

## 2018-03-28 DIAGNOSIS — E66.01: ICD-10-CM

## 2018-03-28 DIAGNOSIS — I48.91: ICD-10-CM

## 2018-03-28 DIAGNOSIS — J96.02: ICD-10-CM

## 2018-03-28 DIAGNOSIS — Z86.73: ICD-10-CM

## 2018-03-28 DIAGNOSIS — J44.9: ICD-10-CM

## 2018-03-28 DIAGNOSIS — Z79.899: ICD-10-CM

## 2018-03-28 DIAGNOSIS — G47.33: ICD-10-CM

## 2018-03-28 DIAGNOSIS — Z79.01: ICD-10-CM

## 2018-03-28 DIAGNOSIS — Z99.81: ICD-10-CM

## 2018-03-28 DIAGNOSIS — I13.0: ICD-10-CM

## 2018-03-28 LAB
BASOPHILS # BLD AUTO: 0 10^3/UL (ref 0–0.2)
EOSINOPHIL # BLD AUTO: 0.1 10^3/UL (ref 0–0.6)
HCT VFR BLD AUTO: 34 % (ref 42–52)
HGB BLD-MCNC: 10.4 G/DL (ref 14–18)
INR PPP/BLD: 3.81 (ref 0.77–1.02)
LYMPHOCYTES # BLD AUTO: 0.6 10^3/UL (ref 1–4.8)
MCH RBC QN AUTO: 26 PG (ref 27–31)
MCHC RBC AUTO-ENTMCNC: 31 G/DL (ref 31–36)
MCV RBC AUTO: 83 FL (ref 80–94)
MONOCYTES # BLD AUTO: 0.4 10^3/UL (ref 0–0.8)
NEUTROPHILS # BLD AUTO: 6.2 10^3/UL (ref 1.5–7.7)
NRBC # BLD AUTO: 0 10^3/UL
NRBC BLD QL AUTO: 0.3
PLATELET # BLD AUTO: 148 10^3/UL (ref 150–450)
RBC # BLD AUTO: 4.06 10^6/UL (ref 4–5.4)
WBC # BLD AUTO: 7.3 10^3/UL (ref 3.5–10.8)

## 2018-03-28 PROCEDURE — 85730 THROMBOPLASTIN TIME PARTIAL: CPT

## 2018-03-28 PROCEDURE — 36415 COLL VENOUS BLD VENIPUNCTURE: CPT

## 2018-03-28 PROCEDURE — 36600 WITHDRAWAL OF ARTERIAL BLOOD: CPT

## 2018-03-28 PROCEDURE — 94760 N-INVAS EAR/PLS OXIMETRY 1: CPT

## 2018-03-28 PROCEDURE — 80053 COMPREHEN METABOLIC PANEL: CPT

## 2018-03-28 PROCEDURE — 87040 BLOOD CULTURE FOR BACTERIA: CPT

## 2018-03-28 PROCEDURE — 94640 AIRWAY INHALATION TREATMENT: CPT

## 2018-03-28 PROCEDURE — 99285 EMERGENCY DEPT VISIT HI MDM: CPT

## 2018-03-28 PROCEDURE — 93005 ELECTROCARDIOGRAM TRACING: CPT

## 2018-03-28 PROCEDURE — 82550 ASSAY OF CK (CPK): CPT

## 2018-03-28 PROCEDURE — 81015 MICROSCOPIC EXAM OF URINE: CPT

## 2018-03-28 PROCEDURE — 81003 URINALYSIS AUTO W/O SCOPE: CPT

## 2018-03-28 PROCEDURE — 71045 X-RAY EXAM CHEST 1 VIEW: CPT

## 2018-03-28 PROCEDURE — 84300 ASSAY OF URINE SODIUM: CPT

## 2018-03-28 PROCEDURE — 82570 ASSAY OF URINE CREATININE: CPT

## 2018-03-28 PROCEDURE — C8929 TTE W OR WO FOL WCON,DOPPLER: HCPCS

## 2018-03-28 PROCEDURE — 80048 BASIC METABOLIC PNL TOTAL CA: CPT

## 2018-03-28 PROCEDURE — 94002 VENT MGMT INPAT INIT DAY: CPT

## 2018-03-28 PROCEDURE — 82803 BLOOD GASES ANY COMBINATION: CPT

## 2018-03-28 PROCEDURE — 83605 ASSAY OF LACTIC ACID: CPT

## 2018-03-28 PROCEDURE — 87086 URINE CULTURE/COLONY COUNT: CPT

## 2018-03-28 PROCEDURE — 85610 PROTHROMBIN TIME: CPT

## 2018-03-28 PROCEDURE — 87641 MR-STAPH DNA AMP PROBE: CPT

## 2018-03-28 PROCEDURE — 83880 ASSAY OF NATRIURETIC PEPTIDE: CPT

## 2018-03-28 PROCEDURE — 93306 TTE W/DOPPLER COMPLETE: CPT

## 2018-03-28 PROCEDURE — 85025 COMPLETE CBC W/AUTO DIFF WBC: CPT

## 2018-03-28 PROCEDURE — 87502 INFLUENZA DNA AMP PROBE: CPT

## 2018-03-28 PROCEDURE — 82553 CREATINE MB FRACTION: CPT

## 2018-03-28 PROCEDURE — 84484 ASSAY OF TROPONIN QUANT: CPT

## 2018-03-28 PROCEDURE — 94660 CPAP INITIATION&MGMT: CPT

## 2018-03-28 RX ADMIN — OXYCODONE HYDROCHLORIDE PRN MG: 5 CAPSULE ORAL at 23:42

## 2018-03-28 RX ADMIN — TAMSULOSIN HYDROCHLORIDE SCH MG: 0.4 CAPSULE ORAL at 22:23

## 2018-03-28 RX ADMIN — MOMETASONE FUROATE AND FORMOTEROL FUMARATE DIHYDRATE SCH: 200; 5 AEROSOL RESPIRATORY (INHALATION) at 19:34

## 2018-03-28 NOTE — HP
CC:  Dr. Scott; Dr. Rae; [facility] *

 

HISTORY AND PHYSICAL:

 

DATE OF ADMISSION:  03/28/18

 

PRIMARY CARE PROVIDER:  [facility].

 

ATTENDING PHYSICIAN WHILE IN THE HOSPITAL:  Carmela York DO * (report 
dictated by Bryce Hunt NP).

 

CHIEF COMPLAINT:

1.  Shortness of breath.

2.  "My BiPAP mask won't fit."

 

HISTORY OF PRESENT ILLNESS:  Mr. Culver is a 68-year-old male patient with 
multiple medical problems.  He has a history of TIA, CVA, AFib, KIM, CHF, 
hypertension, MRSA, and history of BPH.  He comes into our ER today stating 
that the last couple of days, he has had progressive worsening shortness of 
breath.  He has also noted that he has been having difficulty with urination, 
particularly having inability to relieve himself.  He does admit to having 
progressive worsening shortness of breath.  He noticed that his BiPAP was 
getting tighter.  He noticed that over the last several days, he needed to use 
his BiPAP more frequently.  He denies having any cough.  He denies bringing up 
any mucopurulent type sputum.  He denies having any fevers or chills.  He 
states he has gained about 15 pounds in the last week to week and a half.  He 
states that he has not had any chest pain or abdominal discomfort.  There has 
been no nausea or vomiting.  He denies having any recent change in his 
medications, but there was concern because today it was noted that he was 
appearing more sleepy, he was more short of breath, he was having trouble 
maintaining conversation according to the patient.  He states that [facility] 
staff was concerned because they only brought him over for physical therapy, 
but he really was not interacting, so he came over to the hospital today, it 
was noted that he appeared to be in heart failure, he was requiring BiPAP and 
we were asked to evaluate for admission.

 

PAST MEDICAL HISTORY:  Significant for:

1.  TIA.

2.  CVA.

3.  AFib.

4.  KIM.

5.  CHF.

6.  Hypertension.

7.  History of MRSA.

8.  History of BPH.

 

PAST SURGICAL HISTORY:

1.  He has had a defibrillator pacemaker placement.

2.  He has had a hernia repair.

 

MEDICATIONS:  Home meds according to [facility] notes include:

1.  Oxycodone 5 mg every 6 hours as needed.

2.  Atrovent 0.5 mg inhaled every 6 hours as needed.

3.  Acetaminophen 650 mg every 8 hours as needed.

4.  Tylenol 650 mg at bedtime.

5.  Afrin 2 sprays both nares daily.

6.  Flomax 0.4 mg p.o. daily.

7.  Senna 1 tablet p.o. daily.

8.  Advair 1 puff inhaled b.i.d.

9.  Vitamin D 2000 units p.o. daily.

10.  Aspirin 81 mg daily.

11.  Oxycodone 5 mg daily in the morning.

12.  Demadex 10 mg p.o. daily.

13.  The patient was on Coumadin 4 mg p.o. daily, but this has been held in the 
nursing home, his INR today is 3.8.

 

ALLERGIES TO MEDICATIONS:  Include no known drug allergies.

 

FAMILY HISTORY:  Mother had colon cancer.  Father had lung cancer.

 

SOCIAL HISTORY:  He is a former smoker.  He lives at [facility].  He does not 
drink alcohol.  Surrogate decision maker is his brother.

 

REVIEW OF SYSTEMS:  There is no documented fever.  He does admit there was 
significant weight change.  He denies having any double vision.  There is no 
ear discharge.  He denies having any rhinorrhea.  There is no sore throat, no 
thyroid enlargement.  He denied having any chest pain.  He does admit to having 
orthopnea. He does admit to weight gain.  He does admit to dyspnea.  Denies 
having any nausea or vomiting.  No dysuria.  No frequency.  There was no 
seizure.  Denies having any loss of consciousness, no pruritus and no skin 
ulcerations.  Review of 14 systems was completed, all others negative.

 

                               PHYSICAL EXAMINATION

 

GENERAL:  At this time, Mr. Culver is a 68-year-old male patient.  He is 
morbidly obese.  He is sitting in the ED stretcher.  He does not appear to be 
in any acute respiratory distress now.  He says he is feeling better.

 

VITAL SIGNS:  Blood pressure 107/51, pulse 70, respirations 15, O2 saturation 
is 100%, temperature 97.9.

 

HEENT:  Head:  Atraumatic, normocephalic.  Eyes:  EOMs are intact.  Sclerae 
anicteric and not pale.  Throat:  Oral mucosa appears to be moist.  No 
oropharyngeal erythema.

 

NECK:  Supple.

 

LUNGS:  He did have crackle in the bases.  He had wheezing as well in the 
bases. He had equal diaphragmatic expansion.

 

HEART:  Sounds S1, S2.  Regular rate and rhythm.

 

ABDOMEN:  Obese.  It was soft, flat, nontender.  Bowel sounds were present.

 

EXTREMITIES:  Pulses were 2+ throughout.  He is able to move all 4 extremities 
with 5/5 strength.

 

NEUROLOGIC:  He is awake.  He is alert.  He is now oriented x3.  His tongue is 
midline.   are equal.  He had no gross focal deficits.

 

SKIN:  Intact.

 

 DIAGNOSTIC STUDIES/LAB DATA:  WBC of 7.3, RBC of 4.06, hemoglobin 10.4, 
hematocrit of 34, platelet count of 148.  His INR was 3.81, PTT of 44.2.  PH 
was 7.26, repeat was 7.24; pCO2 was 83, repeat was 90.  His bicarb was 31 and 
then it was 31 again. Sodium was 144, potassium of 4.4, chloride 103, bicarb 34
, BUN 64, his creatinine was 3.19.  Looks like his previous creatinines were 
read around 2.3 to 2.2.  His calcium is 8.3, lactate 0.5.  AST was 11, ALT 13, 
albumin of 0.5.  Alk phos 113, CK 23, CK-MB 4.2.  Troponin 0.03.  BNP at 286.  
Albumin is 3.2.

 

The patient did have a chest x-ray obtained today, which showed limited study, 
cardiomegaly, pulmonary interstitial edema.

 

He did have an EKG obtained today, which showed a ventricular paced rhythm at 
rate of 70.  Old medical records were reviewed.

 

ASSESSMENT AND PLAN:  Mr. Culver is a 68-year-old male patient who is morbidly 
obese, coming into the ED today for progressive worsening shortness of breath, 
having a tighter fitting BiPAP mask, weight gain.  We were asked to evaluate 
for admission.  He will be admitted under inpatient status for:

 

1.  Acute hypercarbic respiratory failure.  Again I suspect this is probably 
from a congestive heart failure exacerbation.  My plan would be to go ahead and 
put him on Lasix drip.  We will continue BiPAP now at 18/8.  We will go ahead 
and check weights daily, repeat the echo, cycle his troponins.  I will get BMPs 
every 4 hours and we will continue to follow him closely in the ICU.  I do note 
that the gas got slightly worse in the BiPAP; however, clinically he appears to 
be improving.  He states he is feeling better.  I would like to give the Lasix 
some time to work. This patient would be extremely difficult to intubate.  In 
addition to this, I do not think he quite needed just that he is again 
mentating well.  I did touch base with Dr. Rae on this, he was in agreement.
  We will reserve intubation when absolutely needed.

2.  Urinary retention.  This is probably multifactorial.  I will place a 
consult in to Urology, but at this point there is a catheter in.  When I placed 
the catheter, he had almost 3 L of fluid out.  We will continue to follow him.

3.  Acute renal failure, on chronic renal failure.  This is probably secondary 
to urinary retention.  I will send out for FENa and we will continue to 
monitor.  His BNP is particularly in the setting of Lasix drip.

4.  History of atrial fibrillation.  We will continue his medications as 
prescribed.  He is rate controlled.  We will restart warfarin when his INR just 
below 3.

5.  Obstructive sleep apnea.  Continue with again on his BiPAP.

6.  Hypertension.  Continue with current meds.

7.  History of cerebrovascular accident.  Continue with secondary prevention.

8.  Benign prostatic hypertrophy.  Continue Flomax.

9.  DVT prophylaxis:  Again, INR is 3.8.  We will restart Coumadin when his INR 
drifts back down to normal.

10.  Code status:  Full code.

11.  Fluids, electrolytes, and nutrition.  At this point, he can have a clear 
liquid diet only.

 

TIME SPENT:  On the admission 60 minutes, greater than half the time was spent 
face- to-face with the patient obtaining my history and physical, other half 
the time was spent going over the plan of care with the patient and 
implementing plan of care. I did discuss the plan of care with my attending, 
Dr. York; she is in agreement.

 

 ____________________________________ BRYCE HUNT, UTE

 

[id number]/CPS #: [id number]

NICOLAS

## 2018-03-28 NOTE — RAD
HISTORY: Shortness of breath



COMPARISONS: February 27, 2018



VIEWS: 5: frontal portable view of the chest at of the chest. Evaluation is limited

secondary patient body habitus and positioning.



FINDINGS:

LINES AND TUBES: A left-sided pacemaker is noted.

CARDIOMEDIASTINAL SILHOUETTE: The cardiac silhouette is enlarged. The cardiomediastinal

silhouette is otherwise normal for portable technique.

PLEURA: The costophrenic angles are sharp. No pleural abnormalities are noted.

LUNG PARENCHYMA: There is a diffuse reticular pattern with indistinct pulmonary vessels.

ABDOMEN: The upper abdomen is clear. There is no subphrenic gas.

BONES AND SOFT TISSUES: No bone or soft tissue abnormalities are noted.



IMPRESSION: 

1.  LIMITED STUDY.

2.  CARDIOMEGALY.

3.  PULMONARY INTERSTITIAL EDEMA

## 2018-03-29 LAB
BASOPHILS # BLD AUTO: 0 10^3/UL (ref 0–0.2)
EOSINOPHIL # BLD AUTO: 0 10^3/UL (ref 0–0.6)
HCT VFR BLD AUTO: 32 % (ref 42–52)
HGB BLD-MCNC: 10.3 G/DL (ref 14–18)
INR PPP/BLD: 4.08 (ref 0.77–1.02)
LYMPHOCYTES # BLD AUTO: 0.2 10^3/UL (ref 1–4.8)
MCH RBC QN AUTO: 26 PG (ref 27–31)
MCHC RBC AUTO-ENTMCNC: 32 G/DL (ref 31–36)
MCV RBC AUTO: 81 FL (ref 80–94)
MONOCYTES # BLD AUTO: 0.1 10^3/UL (ref 0–0.8)
NEUTROPHILS # BLD AUTO: 4.9 10^3/UL (ref 1.5–7.7)
NRBC # BLD AUTO: 0 10^3/UL
NRBC BLD QL AUTO: 0.2
PLATELET # BLD AUTO: 149 10^3/UL (ref 150–450)
RBC # BLD AUTO: 3.96 10^6/UL (ref 4–5.4)
WBC # BLD AUTO: 5.2 10^3/UL (ref 3.5–10.8)

## 2018-03-29 RX ADMIN — ASPIRIN SCH MG: 81 TABLET, COATED ORAL at 09:27

## 2018-03-29 RX ADMIN — MOMETASONE FUROATE AND FORMOTEROL FUMARATE DIHYDRATE SCH PUFF: 200; 5 AEROSOL RESPIRATORY (INHALATION) at 07:59

## 2018-03-29 RX ADMIN — OXYCODONE HYDROCHLORIDE PRN MG: 5 CAPSULE ORAL at 05:20

## 2018-03-29 RX ADMIN — MOMETASONE FUROATE AND FORMOTEROL FUMARATE DIHYDRATE SCH PUFF: 200; 5 AEROSOL RESPIRATORY (INHALATION) at 20:56

## 2018-03-29 RX ADMIN — IPRATROPIUM BROMIDE AND ALBUTEROL SULFATE SCH: .5; 3 SOLUTION RESPIRATORY (INHALATION) at 07:59

## 2018-03-29 RX ADMIN — OXYCODONE HYDROCHLORIDE PRN MG: 5 CAPSULE ORAL at 21:11

## 2018-03-29 RX ADMIN — IPRATROPIUM BROMIDE AND ALBUTEROL SULFATE SCH: .5; 3 SOLUTION RESPIRATORY (INHALATION) at 13:27

## 2018-03-29 RX ADMIN — FUROSEMIDE SCH MG: 40 TABLET ORAL at 09:27

## 2018-03-29 RX ADMIN — TAMSULOSIN HYDROCHLORIDE SCH MG: 0.4 CAPSULE ORAL at 21:11

## 2018-03-29 RX ADMIN — IPRATROPIUM BROMIDE AND ALBUTEROL SULFATE SCH: .5; 3 SOLUTION RESPIRATORY (INHALATION) at 01:54

## 2018-03-29 NOTE — ECHO
Patient:      YOLANDA WEAVER

[facility] Rec#:     [id number]            :          1950          

Date:         2018            Age:          68y                 

Account#:     [id number]          Height:       188 cm / 74.0 in

Accession#:   [id number]           Weight:       199 kg / 438.6 lbs

Sex:          M                     BSA:          3

Room#:        438                   

Admit Date#:  2018          

Type:         Inpatient

 

Referring:    Mick Rae MD

Reading:      Francois Rae DO

Sonographer:  Amanda Gloria RN RDCS

______________________________________________________________________

 

Transthoracic Echocardiogram

 

Indication:

Edema

BP:           97/66

HR:           70

Rhythm:       Paced

 

Findings     

History:

HTN, CHF, A. fib, AICD implant, TIA/CVA, KIM, super morbid obesity 

 

Technical Comments:

The study quality is poor.  The study is technically limited due to poor

acoustic windows.  The study is technically limited due to patient body

habitus.  The study was technically limited due to the patient's

inability to lay in the left lateral decubitus position.  

 

Left Ventricle:

The left ventricular chamber size is mildly dilated. Mild concentric

left ventricular hypertrophy is observed. There is mildly decreased left

ventricular systolic function. The estimated ejection fraction is

45-50%.  There is septal flattening of the interventricular septum

consistent with right ventricular volume or pressure overload. and/or

ventricular pacemaker The assessment of diastolic function is

non-diagnostic. 

 

Left Atrium:

The left atrium is not well visualized., it appears dilated. The left

atrium is mild to moderately dilated.  

 

Right Ventricle:

The right ventricle is not well visualized. The right ventricle is

moderately dilated.  The right ventricular global systolic function is

moderately reduced. 

 

Right Atrium:

The right atrium is not well visualized. A pacemaker wire is visualized

in the right atrium. 

 

Aortic Valve:

The aortic valve leaflets are mildly thickened. There is no evidence of

aortic regurgitation. There is no evidence of aortic stenosis. 

 

Mitral Valve:

The mitral valve structure is not well visualized. There is mitral

annular calcification. The mitral valve leaflets are mildly thickened.

Mild subvalvular thickening of the mitral valve is visualized. There is

mild mitral regurgitation.  

 

Tricuspid Valve:

The tricuspid valve structure is not well visualized. 

 

Pulmonic Valve:

The pulmonic valve structure is not well visualized. 

 

Pericardium:

There is no significant pericardial effusion. 

 

Aorta:

There is mild dilatation of the ascending aorta. The aortic arch is not

well visualized.  There is mild dilatation of the aortic root. 

 

Pulmonary Artery:

The main pulmonary artery is not well visualized. 

 

Venous:

The venous system is not well visualized. The inferior vena cava is not

visualized. 

 

Contrast:

Definity was used to optimize study. A total of 5 ml of diluted Definity

was given IV. 

 

Conclusions

The left ventricular chamber size is mildly dilated.

Mild concentric left ventricular hypertrophy is observed.

There is mildly decreased left ventricular systolic function.

The estimated ejection fraction is 45-50%. Even with use of definity

wall motion is not well evaluated for.

There is septal flattening of the interventricular septum consistent

with right ventricular volume and/or pressure overload and/or

ventricular pacemaker

The left atrium is not well visualized., it appears dilated.

The right ventricle is not well visualized.

The right ventricle is moderately dilated. 

The right ventricular global systolic function is moderately reduced.

The right atrium is not well visualized.

A pacemaker wire is visualized in the right atrium.

There is mild mitral regurgitation that may be underestimated in

severity.

The tricuspid valve structure is not well visualized.

Unable to estimate PASP

There is no significant pericardial effusion.

Definity was used to optimize study although it remained a technically

difficult study. 

 

Compared to prior study report from 2017, MR previously moderate to

severe (now mild may be underestimated in severity), prior mild-mod TR

with mod-severe pulm HTN now TR not well visualized.  The right

ventricle size and function was not previously commented on.

 

Measurements     

Name                    Value         Normal Range            

RVIDd (AP) 2D           4.5 cm        (0.9 - 2.6)             

RVDdMajor (2D)          5.7 cm        (2.2 - 4.4)             

IVSd (2D)               1.3 cm        (0.6 - 1)               

LVPWd (2D)              1.2 cm        (0.6 - 1)               

LVIDd (2D)              6.1 cm        (3.6 - 5.4)             

LVIDs (2D)              4.9 cm        -                        

LV FS (2D)              21 %          (25 - 45)               

Aortic Annulus          2.3 cm        (1.4 - 2.6)             

Ao root diameter (2D)   3.8 cm        (2.1 - 3.5)             

Ascending Ao            4 cm          (2.1 - 3.4)             

LA dimension (AP) 2D    5.7 cm        (2.3 - 3.8)             

LAd ISD 4CH             5.3 cm        (2.9 - 5.3)             

LA ISD 4CH W            6 cm          (2.5 - 4.5)             

 

Name                    Value         Normal Range            

MV E-wave Vmax          0.66 m/sec    -                        

MV deceleration time    260 msec      -                        

MV A-wave Vmax          0.72 m/sec    -                        

MV E:A ratio            0.9 ratio     -                        

 

Name                    Value         Normal Range            

AV Vmax                 0.94 m/sec    -                        

AV VTI                  18.8 cm       -                        

AV peak gradient        3.6 mmHg      -                        

AV mean gradient        2.1 mmHg      -                        

LVOT Vmax               0.65 m/sec    -                        

LVOT VTI                13 cm         -                        

LVOT peak gradient      1.7 mmHg      -                        

LVOT mean gradient      1.2 mmHg      -                        

 

Name                    Value         Normal Range            

PV Vmax                 0.85 m/sec    -                        

 

Electronically signed by: Francois Rae DO on 2018 18:55:04

## 2018-03-29 NOTE — PN
Date of Service: 03/29/18


Critical Care Services: 


Morbidly obese male with Hx of hypertension, CHF, Afib, cardiac pacemaker, CVA 

vs TIA, admitted last night with respiratory distress and worsening peripheral 

edema. Started on a furosemide drip and has diuresed about 9 liters since 

admission! No longer complains SOB





Vital Signs: 











Temp Pulse Resp BP SpO2 FiO2


 


98.1 F 71 20 97/66 96 50











Physical Exam: 


Gen:Up in bed and breathing comfortably


Lungs:BS distant. No crackles or wheezes heard.


Cardiac:  No murmurs


Extremities: Trace edema both legs. No cyanosis/





Fluid Balance (Past 24 Hours): 











 03/29/18





 06:59


 


Intake Total 1929


 


Output Total 6400


 


Balance -4471


 


Weight 434 lb 


 


Intake: 


 


  Medicated 


 


    GEN - Furosemide/Lasix 189


 


  Oral 1740


 


Output: 


 


  [place] 6400


 


Other: 


 


  # Bowel Movements 1


 


  Estimated Stool Amount Large





 








Labs: 

















  03/28/18 03/29/18 03/29/18





  19:35 00:15 05:05


 


Sodium  144  142  145


 


Potassium  4.6  4.5  4.2


 


Chloride  103  102  101


 


Carbon Dioxide  33 H  33 H  35 H


 


BUN  65   65   69 


 


Creatinine  3.15   3.23   3.21 


 


Est GFR (Non-Af Amer)  19.7  19.1  19.3


 


Glucose  110 H  218 H  132 H


 


Calcium  8.4 L  8.2 L  8.4 L


 


Troponin I  0.02  0.03 


 


   


 


   


 


   


 


   


 


   


 


   


 


   


 


   


 


   


 


   


 


   














  03/29/18





  05:05


 


WBC  5.2


 


Hgb  10.3 L


 


Hct  32 L


 


Plt Count  149 L


 


 


 


 


 


 


 


 


 


 


 


 


 


 


 


 


 


 


 


 


 


INR (Anticoag Therapy) 4.08





NOTE: Patient on coumadin.





Studies: 


ECG from admission - ventricular paced beats at 70/min





Nutrition: 


Oral diet (low sodium)





Impression: 


Diuresis has resulted in marked clinical improvement since admission. Is 

overanticoagulated with coumadin, but no evidence of abnormal bleeding.





Plan: 


1. D/C furosemide infusion and start oral furosemide at 40 mg daily (has not 

been on furosemide as outpatient)


2. Cardiac ECHO


3. Monitor INR and restart coumadin when indicated.








Critical Care Time: 40 minutes

## 2018-03-30 VITALS — DIASTOLIC BLOOD PRESSURE: 64 MMHG | SYSTOLIC BLOOD PRESSURE: 116 MMHG

## 2018-03-30 LAB — INR PPP/BLD: 3.77 (ref 0.77–1.02)

## 2018-03-30 RX ADMIN — FUROSEMIDE SCH MG: 40 TABLET ORAL at 09:39

## 2018-03-30 RX ADMIN — ASPIRIN SCH MG: 81 TABLET, COATED ORAL at 09:39

## 2018-03-30 RX ADMIN — MOMETASONE FUROATE AND FORMOTEROL FUMARATE DIHYDRATE SCH PUFF: 200; 5 AEROSOL RESPIRATORY (INHALATION) at 07:52

## 2018-03-30 NOTE — DS
TRANSFER SUMMARY:

 

DATE OF ADMISSION:  03/28/18

 

DATE OF TRANSFER:  03/30/18

 

HISTORY:  This is a 68-year-old man who was transferred from [facility] for shortness of breath.  He also complained his BiPAP mask 
would not fit; however, he told me he was using his CPAP or BiPAP every night 
without any problem all night long.  He uses oxygen with it and as well as 
during the day when he uses a nasal cannula.

 

The patient was found to be in congestive heart failure.  The patient stated he 
had gained 15 pounds in 3 days.  He could not really explain this.  He said he 
has weighed weekly.  He did not contradict me when I said I thought he probably 
drank quite a bit of liquids.

 

The patient received intravenous furosemide.  His fluid balance in the course 
of 2 days was over negative 9 L.

 

On the day of discharge, he got furosemide 40 mg in the morning.  I am going to 
change him to torsemide, which is the same drug he took at home, but at a 
higher dose of 40 mg.

 

The patient, I think, has returned to his baseline status.  He was quite 
comfortable.  He had no pedal edema.  His lungs were clear.

 

I recommend he be weighed 3 times a week.  He should have a basic metabolic 
profile every Monday for the next 3 weeks.  His warfarin was held while he was 
here.  His INR seemed to not change very much overall, it was 3.77 on the day 
of discharge. He could have an INR done this coming Monday, April 2nd, as well.
  I would restart his warfarin when his INR is less than 3.

 

FINAL DIAGNOSES:

1.  Acute-on-chronic systolic congestive heart failure.

2.  Morbid obesity.

3.  Chronic obstructive pulmonary disease.

 

DISCHARGE MEDICATIONS:

1.  Albuterol via nebulizer 2.5 mg every 2 hours p.r.n.

2.  Torsemide 40 mg daily.

3.  Tamsulosin 0.4 mg h.s.

4.  Acetaminophen 650 mg h.s.

5.  Ipratropium 0.5 mg every 6 hours p.r.n.

6.  Aspirin 81 mg daily.

7.  Senna/docusate 1 tablet daily.

8.  Oxycodone 5 mg every 6 hours p.r.n.

9.  Miconazole 2% topical p.r.n.

10.  Oxymetazoline 0.5% nasal spray, 2 sprays to both nares daily.

11.  Fluticasone/salmeterol 250/50 one puff b.i.d.

12.  Vitamin D 2000 units daily.

13.  Oxycodone 5 mg daily.

 

The patient is transferred and returned to [facility].

 

 [id number]/CPS #: [id number]

MTDD

## 2018-04-03 NOTE — ED
Devendra FONTAINE Julia, scribed for Derrick Woods on 03/28/18 at 1344 .





Shortness of Breath





- HPI Summary


HPI Summary: 





This patient is a 68 year old M BIBA to [facility] with a chief complaint of SOB and 

chest pain beginning earlier today. Patient reports increase in fluid retention 

and fatigue. He is typically on 3L of 02 via nasal cannula, but EMS increased 

to 5L. Patient has no relief from CPAP machine. Pt has hx of CVA with residual 

right sided weakness.  Pt does not ambulate. 





- History of Current Complaint


Chief Complaint: EDShortnessOfBreath


Time Seen by Provider: 03/28/18 13:25


Hx Obtained From: Patient, EMS


Onset/Duration: Sudden Onset, Lasting Hours


Timing: Constant


Dyspnea At: Rest


Alleviating Factors: Nothing


Associated Signs & Symptoms: Edema - fluid retention and fatigue


Related History: Obesity





- Allergy/Home Medications


Allergies/Adverse Reactions: 


 Allergies











Allergy/AdvReac Type Severity Reaction Status Date / Time


 


No Known Allergies Allergy   Verified 03/28/18 13:25











Home Medications: 


 Home Medications





Acetaminophen TAB* [Tylenol TAB*] 650 mg PO Q8HR PRN 03/28/18 [History 

Confirmed 03/28/18]


Cholecalciferol TAB* [Vitamin D TAB*] 2,000 units PO QAM 03/28/18 [History 

Confirmed 03/28/18]


Fluticasone-Salmeterol 250-50* [Advair Diskus 250-50*] 1 puff INH BID 03/28/18 [

History Confirmed 03/28/18]


Miconazole Nitrate [Athlete's Foot] 2 % TOPICAL BID PRN 03/28/18 [History 

Confirmed 03/28/18]


Oxymetazoline 0.05% NASAL SPR* [Afrin 0.05% NASAL SPRAY*] 2 spray BOTH NARES 

DAILY 03/28/18 [History Confirmed 03/28/18]


oxyCODONE TAB* [Roxycodone TAB 5 mg*] 5 mg PO Q6H PRN 03/28/18 [History 

Confirmed 03/28/18]


oxyCODONE TAB* [Roxycodone TAB 5 mg*] 5 mg PO QAM 03/28/18 [History Confirmed 03 /28/18]











PMH/Surg Hx/FS Hx/Imm Hx


Cardiovascular History: Reports: Hx Congestive Heart Failure, Hx Hypertension, 

Hx Pacemaker/ICD


Respiratory History: Reports: Hx Sleep Apnea


   Denies: Hx Asthma, Hx Chronic Obstructive Pulmonary Disease (COPD)


 History: Reports: Hx Benign Prostatic Hyperplasia, Hx Chronic Renal Failure, 

Hx Kidney Infection, Other  Problems/Disorders - history MRSA in urine


Musculoskeletal History: Reports: Hx Arthritis, Other Musculoskeletal History - 

morbid obesity


Sensory History: 


   Denies: Hx Contacts or Glasses, Hx Hearing Aid


Opthamlomology History: 


   Denies: Hx Contacts or Glasses


Neurological History: Reports: Hx CVA


Psychiatric History: 


   Denies: Hx Panic Disorder





- Surgical History


Surgery Procedure, Year, and Place: HERNIA REPAIR, AICD/PACER


Hx Anesthesia Reactions: No





- Immunization History


Date of Tetanus Vaccine: up to date


Date of Influenza Vaccine: 2016


Infectious Disease History: Yes


Infectious Disease History: Reports: Hx of Known/Suspected MRSA - mrsa neg as 

of f4/30/17


   Denies: Hx Clostridium Difficile, Traveled Outside the [place] in Last 30 Days





- Family History


Family History: FHx of CA.  No FHx of CVA





- Social History


Alcohol Use: None


Hx Substance Use: No


Substance Use Type: Reports: None


Hx Tobacco Use: No


Smoking Status (MU): Former Smoker


Type: Cigarettes


Amount Used/How Often: 1 pack per week


Length of Time of Smoking/Using Tobacco: 1 year


Have You Smoked in the Last Year: No





Review of Systems


Positive: Fatigue


Positive: Chest Pain


Positive: Shortness Of Breath


Positive: Edema - generalized fluid retention


All Other Systems Reviewed And Are Negative: Yes





Physical Exam





- Summary


Physical Exam Summary: 





Appearance: Well appearing, no pain distress, obese


Skin: warm, dry, reflects adequate perfusion


Head/face: normal


Eyes: EOMI, DEANNA


ENT: normal


Neck: supple, non-tender


Respiratory: Mild respiratory failure, poor air entry


Cardiovascular: RRR, pulses symmetrical  


Abdomen: non-tender, soft


Bowel: present


Musculoskeletal: strength/ROM intact, mild pitting edema


Neuro: normal, sensory motor intact, A&Ox3





Triage Information Reviewed: Yes


Vital Signs On Initial Exam: 


 Initial Vitals











Temp Pulse Resp BP Pulse Ox


 


 98.1 F   73   36   162/121   88 


 


 03/28/18 13:19  03/28/18 13:19  03/28/18 13:19  03/28/18 13:19  03/28/18 13:19











Vital Signs Reviewed: Yes





Diagnostics





- Vital Signs


 Vital Signs











  Temp Pulse Resp BP Pulse Ox


 


 03/28/18 13:19  98.1 F  73  36  162/121  88














- Laboratory


Result Diagrams: 


 03/29/18 05:05





 03/30/18 06:34


Lab Statement: Any lab studies that have been ordered have been reviewed, and 

results considered in the medical decision making process.





- Radiology


  ** CXR


Radiology Interpretation Completed By: Radiologist - 1.  LIMITED STUDY. 2.  

CARDIOMEGALY. 3.  PULMONARY INTERSTITIAL EDEMA ED Physician has reviewed this 

report.





- EKG


  ** 1356


Cardiac Rate: NL - at 70BPM


EKG Rhythm: Sinus Rhythm


EKG Interpretation: paced





Course/Dx





- Course


Course Of Treatment: Pt is brought to the ED c/o SOB and chest pain beginning 

earlier today. EKG is unremarkable. CXR reveal cardiomegaly and pulmonary 

edema. ABG reveals elevated CO2. Patient is given 60mg of Lasix prior to CXR 

and 100mg post CXR. Pt is given nebulizer tx and is put on BiPAP. Dr. York 

agrees to admit pt.





- Diagnoses


Differential Diagnosis/HQI/PQRI: Positive: Asthma, Bronchitis, CHF, COPD 

Exacerbation, MI, Pneumonia, Pulmonary Edema, SARS, Other - resp failure


Provider Diagnoses: 


 COPD exacerbation, CHF (congestive heart failure), Renal failure, Respiratory 

failure








- Physician Notifications


Discussed Care of Patient With: Carmela York - hospitalist


Time Discussed With Above Provider: 17:00


Instructed by Provider To: Admit As Inpatient





- Critical Care Time


Critical Care Time: 30-74 min - 60 minutes





Discharge





- Sign-Out/Discharge


Documenting (check all that apply): Discharge - admit





- Discharge Plan


Condition: Improved


Disposition: ADMITTED TO [facility]





- Billing Disposition and Condition


Condition: IMPROVED


Disposition: [facility]





The documentation as recorded by the Devendra padilla Julia accurately reflects 

the service I personally performed and the decisions made by me, Derrick Woods.

## 2018-07-29 ENCOUNTER — HOSPITAL ENCOUNTER (INPATIENT)
Dept: HOSPITAL 25 - ED | Age: 68
LOS: 4 days | Discharge: NURSING, REHAB OR LONG-TERM CARE | DRG: 291 | End: 2018-08-02
Attending: INTERNAL MEDICINE | Admitting: HOSPITALIST
Payer: MEDICARE

## 2018-07-29 DIAGNOSIS — N18.4: ICD-10-CM

## 2018-07-29 DIAGNOSIS — Z87.891: ICD-10-CM

## 2018-07-29 DIAGNOSIS — Z80.0: ICD-10-CM

## 2018-07-29 DIAGNOSIS — R31.9: ICD-10-CM

## 2018-07-29 DIAGNOSIS — J96.12: ICD-10-CM

## 2018-07-29 DIAGNOSIS — Z79.01: ICD-10-CM

## 2018-07-29 DIAGNOSIS — I50.23: ICD-10-CM

## 2018-07-29 DIAGNOSIS — Z86.14: ICD-10-CM

## 2018-07-29 DIAGNOSIS — I69.321: ICD-10-CM

## 2018-07-29 DIAGNOSIS — M25.511: ICD-10-CM

## 2018-07-29 DIAGNOSIS — R20.0: ICD-10-CM

## 2018-07-29 DIAGNOSIS — R47.81: ICD-10-CM

## 2018-07-29 DIAGNOSIS — Z79.82: ICD-10-CM

## 2018-07-29 DIAGNOSIS — I13.0: Primary | ICD-10-CM

## 2018-07-29 DIAGNOSIS — D63.1: ICD-10-CM

## 2018-07-29 DIAGNOSIS — Z82.3: ICD-10-CM

## 2018-07-29 DIAGNOSIS — N40.0: ICD-10-CM

## 2018-07-29 DIAGNOSIS — N39.0: ICD-10-CM

## 2018-07-29 DIAGNOSIS — H91.90: ICD-10-CM

## 2018-07-29 DIAGNOSIS — R29.706: ICD-10-CM

## 2018-07-29 DIAGNOSIS — Z99.81: ICD-10-CM

## 2018-07-29 DIAGNOSIS — Z87.01: ICD-10-CM

## 2018-07-29 DIAGNOSIS — Z87.440: ICD-10-CM

## 2018-07-29 DIAGNOSIS — M17.10: ICD-10-CM

## 2018-07-29 DIAGNOSIS — R33.9: ICD-10-CM

## 2018-07-29 DIAGNOSIS — J96.11: ICD-10-CM

## 2018-07-29 DIAGNOSIS — Z80.1: ICD-10-CM

## 2018-07-29 DIAGNOSIS — I69.351: ICD-10-CM

## 2018-07-29 DIAGNOSIS — E66.2: ICD-10-CM

## 2018-07-29 DIAGNOSIS — I48.91: ICD-10-CM

## 2018-07-29 DIAGNOSIS — Z95.0: ICD-10-CM

## 2018-07-29 LAB
BASOPHILS # BLD AUTO: 0 10^3/UL (ref 0–0.2)
EOSINOPHIL # BLD AUTO: 0.2 10^3/UL (ref 0–0.6)
HCT VFR BLD AUTO: 38 % (ref 42–52)
HGB BLD-MCNC: 12.4 G/DL (ref 14–18)
INR PPP/BLD: 2.18 (ref 0.77–1.02)
LYMPHOCYTES # BLD AUTO: 0.5 10^3/UL (ref 1–4.8)
MCH RBC QN AUTO: 26 PG (ref 27–31)
MCHC RBC AUTO-ENTMCNC: 33 G/DL (ref 31–36)
MCV RBC AUTO: 80 FL (ref 80–94)
MONOCYTES # BLD AUTO: 0.4 10^3/UL (ref 0–0.8)
NEUTROPHILS # BLD AUTO: 5.8 10^3/UL (ref 1.5–7.7)
NRBC # BLD AUTO: 0 10^3/UL
NRBC BLD QL AUTO: 0.1
PLATELET # BLD AUTO: 199 10^3/UL (ref 150–450)
RBC # BLD AUTO: 4.77 10^6/UL (ref 4–5.4)
RBC UR QL AUTO: (no result)
WBC # BLD AUTO: 6.9 10^3/UL (ref 3.5–10.8)
WBC UR QL AUTO: (no result)

## 2018-07-29 PROCEDURE — 85610 PROTHROMBIN TIME: CPT

## 2018-07-29 PROCEDURE — 86901 BLOOD TYPING SEROLOGIC RH(D): CPT

## 2018-07-29 PROCEDURE — 86900 BLOOD TYPING SEROLOGIC ABO: CPT

## 2018-07-29 PROCEDURE — 99283 EMERGENCY DEPT VISIT LOW MDM: CPT

## 2018-07-29 PROCEDURE — 83605 ASSAY OF LACTIC ACID: CPT

## 2018-07-29 PROCEDURE — 85025 COMPLETE CBC W/AUTO DIFF WBC: CPT

## 2018-07-29 PROCEDURE — 81003 URINALYSIS AUTO W/O SCOPE: CPT

## 2018-07-29 PROCEDURE — 85730 THROMBOPLASTIN TIME PARTIAL: CPT

## 2018-07-29 PROCEDURE — 87641 MR-STAPH DNA AMP PROBE: CPT

## 2018-07-29 PROCEDURE — 95819 EEG AWAKE AND ASLEEP: CPT

## 2018-07-29 PROCEDURE — 93306 TTE W/DOPPLER COMPLETE: CPT

## 2018-07-29 PROCEDURE — 94640 AIRWAY INHALATION TREATMENT: CPT

## 2018-07-29 PROCEDURE — 87086 URINE CULTURE/COLONY COUNT: CPT

## 2018-07-29 PROCEDURE — 71045 X-RAY EXAM CHEST 1 VIEW: CPT

## 2018-07-29 PROCEDURE — 83735 ASSAY OF MAGNESIUM: CPT

## 2018-07-29 PROCEDURE — 80061 LIPID PANEL: CPT

## 2018-07-29 PROCEDURE — 94660 CPAP INITIATION&MGMT: CPT

## 2018-07-29 PROCEDURE — 70450 CT HEAD/BRAIN W/O DYE: CPT

## 2018-07-29 PROCEDURE — C8929 TTE W OR WO FOL WCON,DOPPLER: HCPCS

## 2018-07-29 PROCEDURE — 87040 BLOOD CULTURE FOR BACTERIA: CPT

## 2018-07-29 PROCEDURE — 93005 ELECTROCARDIOGRAM TRACING: CPT

## 2018-07-29 PROCEDURE — 80048 BASIC METABOLIC PNL TOTAL CA: CPT

## 2018-07-29 PROCEDURE — 86850 RBC ANTIBODY SCREEN: CPT

## 2018-07-29 PROCEDURE — 84484 ASSAY OF TROPONIN QUANT: CPT

## 2018-07-29 PROCEDURE — 36415 COLL VENOUS BLD VENIPUNCTURE: CPT

## 2018-07-29 PROCEDURE — 81015 MICROSCOPIC EXAM OF URINE: CPT

## 2018-07-29 PROCEDURE — 80053 COMPREHEN METABOLIC PANEL: CPT

## 2018-07-29 RX ADMIN — FUROSEMIDE SCH MG: 10 INJECTION, SOLUTION INTRAMUSCULAR; INTRAVENOUS at 16:00

## 2018-07-29 RX ADMIN — WARFARIN SODIUM SCH MG: 2.5 TABLET ORAL at 20:06

## 2018-07-29 RX ADMIN — TAMSULOSIN HYDROCHLORIDE SCH MG: 0.4 CAPSULE ORAL at 20:07

## 2018-07-29 RX ADMIN — MOMETASONE FUROATE AND FORMOTEROL FUMARATE DIHYDRATE SCH: 200; 5 AEROSOL RESPIRATORY (INHALATION) at 22:27

## 2018-07-29 NOTE — ED
Upper Extremity Pain





- HPI Summary


HPI Summary: 





This is randy Mendenhall documenting for attending Dr. Florentin Maurice MD.


This patient is a 68 year old M presenting to [facility] with a chief complaint of 

numbness in his right arm since earlier this morning. The patient rates the 

pain 6/10 in severity. Patient reports slurred speech, SOB with air tube, 

numbness in his legs and arms, difficulty moving right arm and hand, chest 

tightness, weakness in right side, and increased urinary frequency (every 15 

mins). Pt was feeling normal this morning before he started having symptoms. Pt 

tried CPAP but this made his breathing worse. Pt is always on oxygen while in 

bed. A alexandre catheter was put in place in the [facility]. Pt was feeling better 

during the physical exam than he did upon initial arrival to [facility]. PMHx CVA. No 

PMHx dialysis. SHx lives in [place]. 








- History of Current Complaint


Chief Complaint: EDNeurologicalDeficit


Stated Complaint: POSS CVA


Time Seen by Provider: 07/29/18 11:59


Hx Obtained From: Patient


Mechanism Of Injury: Other - numbness


Onset/Duration: Started Hours Ago - this morning


Timing: Constant


Severity Initially: Severe


Severity Currently: Severe


Pain Location: Arm - right


Character: Unable to Describe - numb





- Allergies/Home Medications


Allergies/Adverse Reactions: 


 Allergies











Allergy/AdvReac Type Severity Reaction Status Date / Time


 


No Known Allergies Allergy   Verified 03/28/18 13:25











Home Medications: 


 Home Medications





Nitrofurantoin Macrocrystal [Nitrofurantoin] 100 mg PO BID 07/29/18 [History 

Confirmed 07/29/18]


Spironolactone TAB* [Aldactone TAB*] 12.5 mg PO DAILY 07/29/18 [History 

Confirmed 07/29/18]


Warfarin TAB(*) [Coumadin TAB(*)] 2.5 mg PO 1700 07/29/18 [History Confirmed 07/ 29/18]











PMH/Surg Hx/FS Hx/Imm Hx


Cardiovascular History: Reports: Hx Congestive Heart Failure, Hx Hypertension, 

Hx Pacemaker/ICD


Respiratory History: Reports: Hx Pneumonia, Hx Pulmonary Edema, Hx Sleep Apnea


   Denies: Hx Asthma, Hx Chronic Obstructive Pulmonary Disease (COPD)


 History: Reports: Hx Benign Prostatic Hyperplasia, Hx Chronic Renal Failure, 

Hx Kidney Infection, Other  Problems/Disorders - history MRSA in urine


Musculoskeletal History: Reports: Hx Arthritis - knee, Other Musculoskeletal 

History - morbid obesity


Sensory History: Reports: Hx Hearing Problem - mild Andreafski


   Denies: Hx Contacts or Glasses, Hx Hearing Aid


Opthamlomology History: 


   Denies: Hx Contacts or Glasses


Neurological History: Reports: Hx CVA


Psychiatric History: 


   Denies: Hx Panic Disorder





- Surgical History


Surgery Procedure, Year, and Place: HERNIA REPAIR, AICD/PACER


Hx Anesthesia Reactions: No





- Immunization History


Date of Tetanus Vaccine: up to date


Date of Influenza Vaccine: 2016


Infectious Disease History: No


Infectious Disease History: Reports: Hx of Known/Suspected MRSA - mrsa neg as 

of f4/30/17


   Denies: Hx Clostridium Difficile, Traveled Outside the [place] in Last 30 Days





- Family History


Known Family History: Positive: Other - Stroke


Family History: FHx of CA.  No FHx of CVA





- Social History


Alcohol Use: None


Hx Substance Use: No


Substance Use Type: Reports: None


Hx Tobacco Use: No


Smoking Status (MU): Former Smoker


Type: Cigarettes


Amount Used/How Often: 1 pack per week


Length of Time of Smoking/Using Tobacco: 1 year


Have You Smoked in the Last Year: No





Review of Systems


Positive: Other - Chest tightness


Positive: Shortness Of Breath


Positive: frequency


Positive: Weakness, Numbness - right arm and leg, Slurred Speech


All Other Systems Reviewed And Are Negative: Yes





Physical Exam





- Summary


Physical Exam Summary: 





Constitutional: Well-developed, Well-nourished, Alert. Pt is morbidly obese. 


Skin: Warm, Dry


HENT: Normocephalic; Atraumatic. Speech is mildly slurred. 


Eyes: Conjunctiva normal


Neck: Musculoskeletal ROM normal neck. 


Cardio: Rhythm regular, rate normal, Heart sounds normal; Intact distal pulses; 

The pedal pulses are 2+ and symmetric. Radial pulses are 2+ and symmetric. 


Pulmonary/Chest wall: Effort normal. 


Abd: Soft.


Musculoskeletal:  is weak. Can just elevate his arm a few degrees off the 

bed. Cannot reach 30 degrees of arm flexion in his right shoulder. RLE effort 

is weak. 


Neuro: Alert, Oriented x3


Psych: Mood and affect Normal


GCS: 15


Triage Information Reviewed: Yes


Vital Signs On Initial Exam: 


 Initial Vitals











Pulse Ox


 


 96 


 


 07/29/18 11:59











Vital Signs Reviewed: Yes





Diagnostics





- Vital Signs


 Vital Signs











  Temp Pulse Resp BP Pulse Ox


 


 07/29/18 13:00   71  17   96


 


 07/29/18 12:25  97.6 F  76  26  115/73  96


 


 07/29/18 12:16   80  25  115/73  94


 


 07/29/18 12:15   84  6   95


 


 07/29/18 11:59      96














- Laboratory


Lab Results: 


 Lab Results











  07/29/18 07/29/18 07/29/18 Range/Units





  12:23 12:31 12:31 


 


WBC   6.9   (3.5-10.8)  10^3/ul


 


RBC   4.77   (4.00-5.40)  10^6/ul


 


Hgb   12.4 L   (14.0-18.0)  g/dl


 


Hct   38 L   (42-52)  %


 


MCV   80   (80-94)  fL


 


MCH   26 L   (27-31)  pg


 


MCHC   33   (31-36)  g/dl


 


RDW   16 H   (10.5-15)  %


 


Plt Count   199   (150-450)  10^3/ul


 


MPV   7.7   (7.4-10.4)  um3


 


Neut % (Auto)   84.5 H   (38-83)  %


 


Lymph % (Auto)   7.2 L   (25-47)  %


 


Mono % (Auto)   5.1   (0-7)  %


 


Eos % (Auto)   2.6   (0-6)  %


 


Baso % (Auto)   0.6   (0-2)  %


 


Absolute Neuts (auto)   5.8   (1.5-7.7)  10^3/ul


 


Absolute Lymphs (auto)   0.5 L   (1.0-4.8)  10^3/ul


 


Absolute Monos (auto)   0.4   (0-0.8)  10^3/ul


 


Absolute Eos (auto)   0.2   (0-0.6)  10^3/ul


 


Absolute Basos (auto)   0   (0-0.2)  10^3/ul


 


Absolute Nucleated RBC   0   10^3/ul


 


Nucleated RBC %   0.1   


 


INR (Anticoag Therapy)    2.18 H  (0.77-1.02)  


 


APTT    36.4 H  (26.0-36.3)  seconds


 


Sodium     (135-145)  mmol/L


 


Potassium     (3.5-5.0)  mmol/L


 


Chloride     (101-111)  mmol/L


 


Carbon Dioxide     (22-32)  mmol/L


 


Anion Gap     (2-11)  mmol/L


 


BUN     (6-24)  mg/dL


 


Creatinine     (0.67-1.17)  mg/dL


 


Est GFR ( Amer)     (>60)  


 


Est GFR (Non-Af Amer)     (>60)  


 


BUN/Creatinine Ratio     (8-20)  


 


Glucose     ()  mg/dL


 


POC Glucose (mg/dL)  117 H    ()  mg/dL


 


Lactic Acid     (0.5-2.0)  mmol/L


 


Calcium     (8.6-10.3)  mg/dL


 


Total Bilirubin     (0.2-1.0)  mg/dL


 


AST     (13-39)  U/L


 


ALT     (7-52)  U/L


 


Alkaline Phosphatase     ()  U/L


 


Troponin I     (<0.04)  ng/mL


 


Total Protein     (6.4-8.9)  g/dL


 


Albumin     (3.2-5.2)  g/dL


 


Globulin     (2-4)  g/dL


 


Albumin/Globulin Ratio     (1-3)  


 


Triglycerides     mg/dL


 


Cholesterol     mg/dL


 


LDL Cholesterol     mg/dL


 


HDL Cholesterol     mg/dL


 


Urine Color     


 


Urine Appearance     


 


Urine pH     (5-9)  


 


Ur Specific Gravity     (1.010-1.030)  


 


Urine Protein     (Negative)  


 


Urine Ketones     (Negative)  


 


Urine Blood     (Negative)  


 


Urine Nitrate     (Negative)  


 


Urine Bilirubin     (Negative)  


 


Urine Urobilinogen     (Negative)  


 


Ur Leukocyte Esterase     (Negative)  


 


Urine WBC (Auto)     (Absent)  


 


Urine RBC (Auto)     (Absent)  


 


Ur Squamous Epith Cells     (Absent)  


 


Urine Bacteria     (Absent)  


 


Urine Glucose     (Negative)  


 


Blood Type     


 


Antibody Screen     














  07/29/18 07/29/18 07/29/18 Range/Units





  12:31 12:31 12:31 


 


WBC     (3.5-10.8)  10^3/ul


 


RBC     (4.00-5.40)  10^6/ul


 


Hgb     (14.0-18.0)  g/dl


 


Hct     (42-52)  %


 


MCV     (80-94)  fL


 


MCH     (27-31)  pg


 


MCHC     (31-36)  g/dl


 


RDW     (10.5-15)  %


 


Plt Count     (150-450)  10^3/ul


 


MPV     (7.4-10.4)  um3


 


Neut % (Auto)     (38-83)  %


 


Lymph % (Auto)     (25-47)  %


 


Mono % (Auto)     (0-7)  %


 


Eos % (Auto)     (0-6)  %


 


Baso % (Auto)     (0-2)  %


 


Absolute Neuts (auto)     (1.5-7.7)  10^3/ul


 


Absolute Lymphs (auto)     (1.0-4.8)  10^3/ul


 


Absolute Monos (auto)     (0-0.8)  10^3/ul


 


Absolute Eos (auto)     (0-0.6)  10^3/ul


 


Absolute Basos (auto)     (0-0.2)  10^3/ul


 


Absolute Nucleated RBC     10^3/ul


 


Nucleated RBC %     


 


INR (Anticoag Therapy)     (0.77-1.02)  


 


APTT     (26.0-36.3)  seconds


 


Sodium  141    (135-145)  mmol/L


 


Potassium  4.0    (3.5-5.0)  mmol/L


 


Chloride  98 L    (101-111)  mmol/L


 


Carbon Dioxide  39 H    (22-32)  mmol/L


 


Anion Gap  4    (2-11)  mmol/L


 


BUN  60 H    (6-24)  mg/dL


 


Creatinine  2.73 H    (0.67-1.17)  mg/dL


 


Est GFR ( Amer)  28.1    (>60)  


 


Est GFR (Non-Af Amer)  23.2    (>60)  


 


BUN/Creatinine Ratio  22.0 H    (8-20)  


 


Glucose  107 H    ()  mg/dL


 


POC Glucose (mg/dL)     ()  mg/dL


 


Lactic Acid   1.0   (0.5-2.0)  mmol/L


 


Calcium  8.7    (8.6-10.3)  mg/dL


 


Total Bilirubin  0.40    (0.2-1.0)  mg/dL


 


AST  9 L    (13-39)  U/L


 


ALT  6 L    (7-52)  U/L


 


Alkaline Phosphatase  84    ()  U/L


 


Troponin I  0.01    (<0.04)  ng/mL


 


Total Protein  6.8    (6.4-8.9)  g/dL


 


Albumin  3.2    (3.2-5.2)  g/dL


 


Globulin  3.6    (2-4)  g/dL


 


Albumin/Globulin Ratio  0.9 L    (1-3)  


 


Triglycerides  107    mg/dL


 


Cholesterol  115    mg/dL


 


LDL Cholesterol  67    mg/dL


 


HDL Cholesterol  27.1    mg/dL


 


Urine Color     


 


Urine Appearance     


 


Urine pH     (5-9)  


 


Ur Specific Gravity     (1.010-1.030)  


 


Urine Protein     (Negative)  


 


Urine Ketones     (Negative)  


 


Urine Blood     (Negative)  


 


Urine Nitrate     (Negative)  


 


Urine Bilirubin     (Negative)  


 


Urine Urobilinogen     (Negative)  


 


Ur Leukocyte Esterase     (Negative)  


 


Urine WBC (Auto)     (Absent)  


 


Urine RBC (Auto)     (Absent)  


 


Ur Squamous Epith Cells     (Absent)  


 


Urine Bacteria     (Absent)  


 


Urine Glucose     (Negative)  


 


Blood Type    O Negative  


 


Antibody Screen    Negative  














  07/29/18 Range/Units





  12:43 


 


WBC   (3.5-10.8)  10^3/ul


 


RBC   (4.00-5.40)  10^6/ul


 


Hgb   (14.0-18.0)  g/dl


 


Hct   (42-52)  %


 


MCV   (80-94)  fL


 


MCH   (27-31)  pg


 


MCHC   (31-36)  g/dl


 


RDW   (10.5-15)  %


 


Plt Count   (150-450)  10^3/ul


 


MPV   (7.4-10.4)  um3


 


Neut % (Auto)   (38-83)  %


 


Lymph % (Auto)   (25-47)  %


 


Mono % (Auto)   (0-7)  %


 


Eos % (Auto)   (0-6)  %


 


Baso % (Auto)   (0-2)  %


 


Absolute Neuts (auto)   (1.5-7.7)  10^3/ul


 


Absolute Lymphs (auto)   (1.0-4.8)  10^3/ul


 


Absolute Monos (auto)   (0-0.8)  10^3/ul


 


Absolute Eos (auto)   (0-0.6)  10^3/ul


 


Absolute Basos (auto)   (0-0.2)  10^3/ul


 


Absolute Nucleated RBC   10^3/ul


 


Nucleated RBC %   


 


INR (Anticoag Therapy)   (0.77-1.02)  


 


APTT   (26.0-36.3)  seconds


 


Sodium   (135-145)  mmol/L


 


Potassium   (3.5-5.0)  mmol/L


 


Chloride   (101-111)  mmol/L


 


Carbon Dioxide   (22-32)  mmol/L


 


Anion Gap   (2-11)  mmol/L


 


BUN   (6-24)  mg/dL


 


Creatinine   (0.67-1.17)  mg/dL


 


Est GFR ( Amer)   (>60)  


 


Est GFR (Non-Af Amer)   (>60)  


 


BUN/Creatinine Ratio   (8-20)  


 


Glucose   ()  mg/dL


 


POC Glucose (mg/dL)   ()  mg/dL


 


Lactic Acid   (0.5-2.0)  mmol/L


 


Calcium   (8.6-10.3)  mg/dL


 


Total Bilirubin   (0.2-1.0)  mg/dL


 


AST   (13-39)  U/L


 


ALT   (7-52)  U/L


 


Alkaline Phosphatase   ()  U/L


 


Troponin I   (<0.04)  ng/mL


 


Total Protein   (6.4-8.9)  g/dL


 


Albumin   (3.2-5.2)  g/dL


 


Globulin   (2-4)  g/dL


 


Albumin/Globulin Ratio   (1-3)  


 


Triglycerides   mg/dL


 


Cholesterol   mg/dL


 


LDL Cholesterol   mg/dL


 


HDL Cholesterol   mg/dL


 


Urine Color  Yellow  


 


Urine Appearance  Cloudy  


 


Urine pH  7.0  (5-9)  


 


Ur Specific Gravity  1.010  (1.010-1.030)  


 


Urine Protein  1+(30 mg/dl) A  (Negative)  


 


Urine Ketones  Negative  (Negative)  


 


Urine Blood  1+ A  (Negative)  


 


Urine Nitrate  Negative  (Negative)  


 


Urine Bilirubin  Negative  (Negative)  


 


Urine Urobilinogen  Negative  (Negative)  


 


Ur Leukocyte Esterase  2+ A  (Negative)  


 


Urine WBC (Auto)  2+(11-20/hpf) A  (Absent)  


 


Urine RBC (Auto)  1+(3-5/hpf) A  (Absent)  


 


Ur Squamous Epith Cells  Present A  (Absent)  


 


Urine Bacteria  Absent  (Absent)  


 


Urine Glucose  Negative  (Negative)  


 


Blood Type   


 


Antibody Screen   











Result Diagrams: 


 07/29/18 12:31





 07/29/18 12:31


Lab Statement: Any lab studies that have been ordered have been reviewed, and 

results considered in the medical decision making process.





- Radiology


  ** CXR


Radiology Interpretation Completed By: Radiologist - IMPRESSION:  NEGATIVE 

EXAMINATION. CARDIOMEGALY WITH SUSPECTED INTERSTITIAL EDEMA AND LEFT-SIDED 

INFILTRATE, ATELECTASIS AND/OR PLEURAL FLUID.  ER Physician has reviewed this 

report.





- CT


  ** Brain


CT Interpretation Completed By: Radiologist - IMPRESSION: NO ACUTE INTRACRANIAL 

FINDINGS. ER Physician reviewed this report





- EKG


  ** 12:28


Cardiac Rate: NL - 74 bpm


EKG Rhythm: Sinus Rhythm


EKG Interpretation: Chronic ST elevation. Paced rhythm.





National Institutes Of Health





- NIH Scale


Level of Consciousness: Alert/Keenly Responsive


Ask Patient the Month and His/Her Age: Both Correct


Ask Pt to Open/Close Eyes and /Release Non-Paretic Hand: Both Correctly


Best Gaze (Only Horizontal Eye Movement): Normal


Visual Field Testing: No Visual Loss


Facial Paresis-Pt to Smile & Close Eyes or Grimace Symmetry: Normal/Symmetrical


Motor Function - Right Arm: Effort Against Gravity


Motor Function - Left Arm: No Drift-Holds 10 Seconds


Motor Function - Right Leg: Effort Against Gravity


Motor Function - Left Leg: No Drift-Holds 10 Seconds


Limb Ataxia-Must be out of Proportion to Weakness Present: Absent


Sensory (Use Pinprick to Test Arms/Legs/Trunk/Face): Pinprick Less on Affected


Best Language (Describe Picture, Name Items): No Aphasia


Dysarthria (Read Several Words): Slurs Some Words


Extinction and Inattention: No Abnormality


Total Score: 6





Re-Evaluation





- Re-Evaluation


  ** First Eval


Re-Evaluation Time: 14:35


Comment: Pt's brother reports that every time the pt has a kidney infection he 

ends up with stroke symptoms





Course/Dx





- Course


Course Of Treatment: This is scribe Fabrice Mendenhall documenting for attending Dr. Florentin Maurice MD.  This patient is a 68 year old M presenting to [facility] with a 

chief complaint of numbness in his right arm since earlier this morning. The 

patient rates the pain 6/10 in severity. Patient reports slurred speech, SOB 

with air tube, numbness in his legs and arms, difficulty moving right arm and 

hand, chest tightness, weakness in right side, and increased urinary frequency (

every 15 mins). Pt was feeling normal this morning before he started having 

symptoms. Pt tried CPAP but this made his breathing worse. Pt is always on 

oxygen while in bed. A alexandre catheter was put in place in [facility]. Pt was 

feeling better during the physical exam than he did upon initial arrival to 

[facility]. PMHx CVA. No PMHx dialysis. SHx lives in [place].  An EKG reveals NSR 74 bpm

, paced rhythm, chronic ST elevation.  CXR reveals, per radiologist, NEGATIVE 

EXAMINATION. CARDIOMEGALY WITH SUSPECTED INTERSTITIAL EDEMA AND.  LEFT-SIDED 

INFILTRATE, ATELECTASIS AND/OR PLEURAL FLUID.  Brain CT reveals IMPRESSION: NO 

ACUTE INTRACRANIAL FINDINGS.  ED physician has reviewed this radiology report.  

In the ED course the pt was given IV fluids, Rocephin, and a alexandre catheter.  

We discussed patient care with Dr. Champion and they recommended no CTA due to 

renal insufficiency, no TPA due to anti-coagulants, and no MRI due to 

pacemaker. NIH 7, per Dr. Champion. We will admit him to [facility].  The patient is 

agreeable with this plan.





- Diagnoses


Provider Diagnoses: 


 UTI (urinary tract infection), CVA (cerebral vascular accident)








- Physician Notifications


Discussed Care of Patient With: Nito Champion


Time Discussed With Above Provider: 14:25


Instructed by Provider To: Admit As Inpatient - Reccomends no CTA due to renal 

failure, no TPA due to anti-coagulants, no MRI due to pacemaker. Pt is morbidly 

obese. NIH score of 7 via Dr. Champion.





Discharge





- Sign-Out/Discharge


Documenting (check all that apply): Patient Departure - Admit





- Discharge Plan


Condition: Fair


Disposition: ADMITTED TO [facility]


Patient Education Materials:  Urinary Tract Infection in Men (ED)


Referrals: 


Maghaydah,Qutaybeh, MD [Primary Care Provider] -

## 2018-07-29 NOTE — RAD
INDICATION: Neurologic change. Code gray



COMPARISON: CT brain February 27, 2018



TECHNIQUE: Noncontrast axial source images were acquired from the skull base to the

vertex. Imaging is mildly limited due to patient cooperation and positioning



FINDINGS:



Ventricles/sulci: There is cortical atrophy with compensatory dilatation of the CSF

spaces.



Brain parenchyma: There is no acute appearing focal parenchymal finding, evidence of

intracranial mass,  or intracranial mass effect.



Intracranial hemorrhage:None.



Extra-axial spaces: There are no abnormal extra axial fluid collections or evidence of

extra-axial mass.



Calvarium: There is no calvarial fracture or other calvarial abnormality.



Scalp: There is no evidence of scalp or extracalvarial soft tissue abnormality.



Paranasal sinuses/mastoid: The paranasal sinuses and mastoid air cells are clear.



Other: None.



IMPRESSION: NO ACUTE INTRACRANIAL FINDINGS.



Findings called to ED at 1210 hours

## 2018-07-29 NOTE — HP
CC:  [facility] *

 

ADMISSION HISTORY AND PHYSICAL:

 

DATE OF ADMISSION:  18

 

PRIMARY CARE PROVIDER:  [facility].

 

MY ATTENDING WHILE IN THE HOSPITAL:  Dr. Carmela York.* (DICTATED BY MARY KATE SIMPSON)

 

CHIEF COMPLAINT:  Right-sided weakness times several hours.

 

HISTORY OF PRESENT ILLNESS:  Mr. Culver is a 68-year-old male with past medical 
history significant for heart failure with reduced ejection fraction, chronic 
kidney disease, urinary retention due to BPH, history of CVA with residual right
- sided weakness, super morbid obesity, and obstructive sleep apnea, who 
presents to the emergency department after this morning he was in his usual 
state of health when he suddenly began to feel short of breath and then began 
to feel weak on his right side consistent with numerous other episodes, I 
believe, which would be related to residual deficits from his CVA.  The patient 
states that he was unable to abduct his right arm, which is new for him.  The 
patient also states that he had some slurred speech.  The patient was brought 
to the emergency department, called as a Code Gray, and [facility]  The patient was recommended against 
tPA due to chronic anticoagulation.  The patient was deemed to have an NIH 
Stroke Score of 7.  The patient states this is worse than it has been before; 
however, documentation from both previous admissions to this hospital as well 
as recent admission to [facility] where he had sepsis related 
to MRSA and VRE, he had similar complaints of dysarthria and right- sided 
weakness including with the upper arm, which is where he states the primary 
difference is.  The patient also states that he has numbness in his right leg 
and arm, which had also been previously documented.  The patient has been 
unable to walk for the last 7 months due to physical deconditioning.  The 
patient has recently been having severely increased urinary frequency with a 
feeling of having to go every 15 minutes, pain with going and urinating only 
small amounts at a time. The patient also has been having a feeling of a full 
bladder.  The patient has a history of urinary retention previously with severe 
obstructive uropathy.  The patient is weighted frequently at [facility] and 
states that he has been gaining a lot of weight.  Per documentation, the 
patient has gained 25 pounds since he has been in the hospital last.  The 
patient was diuresed over 9 L at that time.  The patient does not have 
indwelling Vallecillo catheter, though he did as of March of this year.  The patient 
is on tamsulosin.  The patient does not follow up with urologist.  The patient 
has a known history of heart failure with reduced ejection fraction, though 
recent transesophageal echocardiogram from [place] showed a normal ejection 
fraction, but transthoracic echocardiogram from this institution in March 
showed a reduced ejection fraction.  The patient had a recent urinary culture 
on 18, which had no growth of clinically significant organisms.  The 
patient has documentation from the nursing home that he was to be treated 
empirically for UTI on 18, though this was discontinued for some unknown 
reason.  It looks as though the patient received ciprofloxacin and Bactrim at 
that time.  The patient's lab work is all at baseline.  Due to concern for 
urinary tract infection as well as possible new CVA, we were asked to evaluate 
for admission.

 

PAST MEDICAL HISTORY:  Chronic kidney disease; heart failure with reduced 
ejection fraction; CVA; AFib; obstructive sleep apnea; hypertension; BPH; 
urinary retention; anemia; morbid obesity; history of MRSA UTI and VRE 
bacteremia; chronic respiratory failure, on 3 L of oxygen.

 

PAST SURGICAL HISTORY:  Pacemaker implantation, hernia repair.

 

MEDICATIONS:

1.  Tamsulosin 0.4 mg p.o. at bedtime.

2.  Tylenol 650 mg p.o. at bedtime.

3.  Ipratropium 0.5 mg inhalation q.6 hours as needed.

4.  Aspirin 81 mg p.o. daily.

5.  Senna-S 1 tab p.o. q.a.m.

6.  Oxycodone 5 mg p.o. q.6 hours as needed.

7.  Tylenol 650 mg p.o. q.8 hours as needed.

8.  Advair Diskus 1 puff inhalation b.i.d.

9.  Vitamin D 2000 units p.o. q.a.m.

10.  Roxicodone 5 mg p.o. q.a.m.

11.  Albuterol nebulizer 2.5 mg inhalation q.2 hours as needed.

12.  Torsemide 40 mg p.o. daily.

13.  Warfarin 2.5 mg p.o. daily.

14.  Spironolactone 12.5 mg p.o. daily.

 

ALLERGIES:  No known drug allergies.

 

FAMILY HISTORY:  The patient's mother  of colon cancer.  The patient's 
father  of lung cancer.  The patient has 3 brothers and 2 sisters, all of 
whom are healthy.

 

SOCIAL HISTORY:  The patient has a very remote history of smoking, not a large 
amount.  The patient has no history of alcohol abuse and has a very remote 
history of illicit drug use.  The patient used to be in the  and then 
ran movie theaters in the area.  The patient was never  and does not 
have any children.

 

REVIEW OF SYSTEMS:  A 14-point review of systems was reviewed and is negative 
except as above.

 

                               PHYSICAL EXAMINATION

 

GENERAL:  The patient is a 68-year-old male, who appears older than stated age 
and sitting comfortably in bed with intermittent movements, which he attributes 
to pain.

 

VITAL SIGNS:  At the time of evaluation, temperature 97.6, pulse rate 70, 
respiratory rate 27, oxygen saturation 98% on 4 L, blood pressure 122/59.

 

HEENT:  Head:  Normocephalic, atraumatic.  Sclerae anicteric.  No conjunctival 
injection.  Nasal mucosa moist.  Oral mucosa moist.  No pharyngeal erythema, 
discharge, or exudate.

 

NECK:  Obese, supple, nontender.  No lymphadenopathy.  No carotid bruits 
auscultated.  No JVD.

 

RESPIRATORY:  Severely diminished throughout due to body habitus.  No 
adventitious lung sounds.

 

CARDIAC:  Regular rate and rhythm.  No clicks, murmurs, gallops, or rubs.  
Pulses are 2+ in the bilateral dorsalis pedis, posterior tibial, and radial 
areas.  Trace bilateral lower extremity edema noted.  No calf tenderness 
bilaterally.

 

ABDOMEN:  Obese, soft, nontender, nondistended.  No hepatosplenomegaly.  Again, 
limited by body habitus.

 

GENITOURINARY:  Suprapubic tenderness.  No CVA tenderness.  Vallecillo catheter 
draining orange urine with clots and cloudy material.

 

NEURO:  Cranial nerves II through XII intact.  The patient has 3/5 strength 
with abduction, flexion, and  strength in the right arm; 4/5 strength in 
the left arm.  3/5 strength in all muscle groups in the right lower extremity, 4
/5 strength in all muscle groups in the left lower extremity.  Reflexes 1+ in 
bilateral biceps, patellar, and Achilles areas.  Downgoing Babinski's 
bilaterally.  Decreased sensation in the right lower extremity.  Alert and 
oriented x3.

 

PSYCHIATRIC:  Pleasant and cooperative.

 

SKIN:  Clean, dry, intact.  A yeasty rash on his groin.

 

 DIAGNOSTIC STUDIES/LAB DATA:  White blood cell count 6.9, hemoglobin 14.2, 
hematocrit 38, platelet count 199.  INR 2.18.  Sodium 141, potassium 4.0, 
chloride 98, carbon dioxide 39, anion gap 4, BUN 60, creatinine 2.73, glucose 
107, lactic acid 1.0, calcium 8.7.  Bilirubin 0.4, AST 9, ALT 6, alk phos 54.  
Troponin I of 0.01.  Protein 6.8, albumin 3.2, globulin 3.6.  Triglycerides 107
, cholesterol 115, LDL cholesterol 67, HDL cholesterol 27.1.  Urine shows 1+ 
protein, 1+ blood, 2+ leukocyte esterase, 2+ white blood cells, 1+ red blood 
cells, positive squamous epithelial cells, negative bacteria.

 

Brain CT read as no acute intracranial findings.

 

Chest x-ray read as negative examination, cardiomegaly, suspect interstitial 
edema, left-sided infiltrate, atelectasis, and/or pleural fluid.  These are 
consistent with previous examination.

 

Electrocardiogram shows paced rhythm, rate 74, right bundle branch morphology 
consistent with previous exam.

 

ASSESSMENT AND PLAN/IMPRESSION:  Mr. Culver is a 68-year-old male with a complex 
past medical history including cerebrovascular accident with multiple 
recurrences of right-sided weakness associated with particularly infections as 
well as heart failure with reduced ejection fraction, chronic respiratory 
failure, morbid obesity, and benign prostatic hyperplasia, who presents to the 
hospital with sudden onset right-sided weakness consistent with his previous 
deficits as well as dysphasia, which is also part of his recurrent neurologic 
dysfunction.  The patient was not a candidate for tPA due to anticoagulation 
and has limited ability to have further neurologic workup.  The patient's 
episode usually resolves on its own.  The patient was found to be retaining 
urine over 1 L.  The patient has symptoms consistent with dysuria.  He will be 
admitted to the hospital with concern for new stroke versus recurrence or 
worsening of recurrent neurologic deficit and will be started on ceftriaxone 
and seen in consultation by Neurology and continued secondary prevention for 
cerebrovascular accident.

 

1.  Weakness, previous cerebrovascular accident, concern for new 
cerebrovascular accident.  The patient has neurologic deficits, which are 
consistent with multiple documented episodes of weakness usually associated 
with episodes of infection.  The patient will be continued on warfarin, 
aspirin.  The patient had a negative CT of the brain.  The patient is unable to 
have a CTA or MRI due to pacemaker and chronic kidney disease respectively.  We 
will defer to Neurology for utility of repeat carotid ultrasound.  The patient 
will have physical therapy to help restore functional capacity.  There is a 
chance this is related to conversion syndrome or recrudescence of previous 
neurological deficits.  The patient was unable to pass his swallow evaluation.  
This will be repeated on the floor and if needed, he will be seen by Speech 
Therapy.

2.  Urinary tract infection, urinary retention.  The patient has a known 
history of recurrent urinary tract infection and urinary retention related to 
benign prostatic hyperplasia.  The patient recently had a negative urine 
culture.  This will be repeated at this time.  The patient was retaining up to 
a liter of urine.  The patient has a Vallecillo catheter inserted.  This will be 
left in place.  The patient should follow up outpatient with Urology as was 
previously recommended for urodynamic testing and consideration for therapy for 
his benign prostatic hyperplasia.  The patient will be continued on tamsulosin 
and started on Proscar. The patient will be started on ceftriaxone pending 
culture and sensitivities.  The patient has blood cultures pending.  The 
patient is not septic.

3.  Shortness of breath.  The patient had quick onset of shortness of breath 
this morning.  The patient has interstitial edema on chest x-ray.  The patient 
has gained 25 pounds since his most recent admission.  The patient will be 
started on increased dose of IV Lasix to facilitate diuresis and hopefully help 
with his shortness of breath.  The patient is saturating well on his home dose 
of oxygen.

4.  Heart failure with reduced ejection fraction.  The patient will be 
continued on spironolactone.  The patient is not on an ACE or an ARB.  These 
will not be started due to the patient's borderline kidney function.  The 
patient is also not on a beta blocker for unknown reason.  Consideration for 
starting this medication should be made during this hospitalization after the 
acute phase of the patient's stroke has passed and if the patient continues to 
improve.

5.  Chronic kidney disease.  The patient's kidney disease is at baseline.  We 
will avoid nephrotoxic medications.  It is possible this may be elevated due to 
chronic obstruction and for this, we would recommend consideration for long-
term indwelling Vallecillo catheter.

6.  Atrial fibrillation.  Continue anticoagulation.  The patient has a 
pacemaker and is rate controlled.

7.  Obstructive sleep apnea.  Continue the patient's home CPAP.

8.  Chronic respiratory failure.  This is likely due to pulmonary hypertension 
related to obesity hypoventilation syndrome and obstructive sleep apnea.  
Continue oxygen.

9.  Anemia.  This is baseline, likely due to the patient's chronic kidney 
disease.

10.  DVT prophylaxis:  The patient will be continued on Coumadin.

11.  FEN:  The patient will be n.p.o. until he passes a swallow evaluation.  
The patient will not have fluids as he is fluid overloaded.

12.  Code status:  The patient would like to be a full code.  The patient's 
surrogate decision maker will be his brother, Nioclas Culver.

 

TIME SPENT:  Approximately 90 minutes was spent on this admission, 45 of which 
was spent face-to-face with the patient obtaining history and physical and 
discussing treatment plan.

 

The plan was discussed with my attending, Dr. Carmela York, and she is in 
agreement.

 

 ____________________________________ MARY KATE SIMPSON

 

[id number]/CPS #: [id number]

MTDD

## 2018-07-29 NOTE — RAD
INDICATION: Neurologic change. Code gray.



COMPARISON: Chest x-ray March 28, 2018

 

TECHNIQUE: An AP supine portable view obtained at 1250 hours is submitted. This is a very

limited examination due to difficulty positioning the patient



FINDINGS: 



Bones/Soft Tissues: There are no acute bony findings. There is left-sided cardiac

pacemaker



Cardiomediastinal: The cardiac silhouette is enlarged. Interstitium is prominent

suggestive of interstitial edema



Lungs: The right lung is grossly clear. There is infiltrate, atelectasis, pleural fluid in

the left lung base.



Pleura: Suspect left-sided effusion is noted above. 



Other: None



IMPRESSION:  NEGATIVE EXAMINATION. CARDIOMEGALY WITH SUSPECTED INTERSTITIAL EDEMA AND

LEFT-SIDED INFILTRATE, ATELECTASIS AND/OR PLEURAL FLUID.

## 2018-07-30 LAB
BASOPHILS # BLD AUTO: 0.1 10^3/UL (ref 0–0.2)
EOSINOPHIL # BLD AUTO: 0.2 10^3/UL (ref 0–0.6)
HCT VFR BLD AUTO: 38 % (ref 42–52)
HGB BLD-MCNC: 12.3 G/DL (ref 14–18)
INR PPP/BLD: 2.23 (ref 0.77–1.02)
LYMPHOCYTES # BLD AUTO: 0.5 10^3/UL (ref 1–4.8)
MCH RBC QN AUTO: 26 PG (ref 27–31)
MCHC RBC AUTO-ENTMCNC: 33 G/DL (ref 31–36)
MCV RBC AUTO: 80 FL (ref 80–94)
MONOCYTES # BLD AUTO: 0.4 10^3/UL (ref 0–0.8)
NEUTROPHILS # BLD AUTO: 6.7 10^3/UL (ref 1.5–7.7)
NRBC # BLD AUTO: 0 10^3/UL
NRBC BLD QL AUTO: 0.1
PLATELET # BLD AUTO: 218 10^3/UL (ref 150–450)
RBC # BLD AUTO: 4.76 10^6/UL (ref 4–5.4)
WBC # BLD AUTO: 7.8 10^3/UL (ref 3.5–10.8)

## 2018-07-30 PROCEDURE — 5A09357 ASSISTANCE WITH RESPIRATORY VENTILATION, LESS THAN 24 CONSECUTIVE HOURS, CONTINUOUS POSITIVE AIRWAY PRESSURE: ICD-10-PCS | Performed by: INTERNAL MEDICINE

## 2018-07-30 PROCEDURE — 4A10X4Z MONITORING OF CENTRAL NERVOUS ELECTRICAL ACTIVITY, EXTERNAL APPROACH: ICD-10-PCS | Performed by: PSYCHIATRY & NEUROLOGY

## 2018-07-30 PROCEDURE — 0T9B70Z DRAINAGE OF BLADDER WITH DRAINAGE DEVICE, VIA NATURAL OR ARTIFICIAL OPENING: ICD-10-PCS | Performed by: INTERNAL MEDICINE

## 2018-07-30 RX ADMIN — CEFTRIAXONE SODIUM SCH MLS/HR: 1 INJECTION, POWDER, FOR SOLUTION INTRAVENOUS at 13:31

## 2018-07-30 RX ADMIN — Medication SCH UNITS: at 08:10

## 2018-07-30 RX ADMIN — ASPIRIN SCH MG: 81 TABLET, COATED ORAL at 08:11

## 2018-07-30 RX ADMIN — SPIRONOLACTONE SCH: 25 TABLET ORAL at 11:43

## 2018-07-30 RX ADMIN — MOMETASONE FUROATE AND FORMOTEROL FUMARATE DIHYDRATE SCH PUFF: 200; 5 AEROSOL RESPIRATORY (INHALATION) at 20:06

## 2018-07-30 RX ADMIN — MOMETASONE FUROATE AND FORMOTEROL FUMARATE DIHYDRATE SCH PUFF: 200; 5 AEROSOL RESPIRATORY (INHALATION) at 09:10

## 2018-07-30 RX ADMIN — DOCUSATE SODIUM SCH MG: 100 CAPSULE, LIQUID FILLED ORAL at 08:11

## 2018-07-30 RX ADMIN — OXYCODONE HYDROCHLORIDE SCH MG: 5 CAPSULE ORAL at 08:10

## 2018-07-30 RX ADMIN — TAMSULOSIN HYDROCHLORIDE SCH MG: 0.4 CAPSULE ORAL at 21:29

## 2018-07-30 RX ADMIN — FUROSEMIDE SCH MG: 10 INJECTION, SOLUTION INTRAMUSCULAR; INTRAVENOUS at 08:11

## 2018-07-30 RX ADMIN — FINASTERIDE SCH MG: 5 TABLET, FILM COATED ORAL at 08:11

## 2018-07-30 RX ADMIN — SENNOSIDES SCH TAB: 8.6 TABLET, FILM COATED ORAL at 08:12

## 2018-07-30 RX ADMIN — WARFARIN SODIUM SCH MG: 2.5 TABLET ORAL at 19:24

## 2018-07-30 NOTE — CONS
CC:  [facility] *

 

NEUROLOGY CONSULTATION REPORT:

 

DATE OF CONSULT:  18

 

LOCATION:  He is an inpatient in room 446.

 

REFERRING PROVIDER:  MARY KATE Curran.

 

CHIEF COMPLAINT:  Right-sided weakness.

 

HISTORY OF PRESENT ILLNESS:  Puneet Culver is a 68-year-old right-handed man, 
who presented to the emergency room late yesterday with worsened right-sided 
weakness. He said that after breakfast, he was lying in his bed and noted he 
could not raise his right arm up like he normally can.  He has a recurrent 
urinary tract infections and apparently had some antibiotics for couple of days 
prior to admission, which were stopped.  His urinalysis yesterday upon 
presentation revealed 2+ leukocyte esterase and 2+ white blood cells.  A 
telestroke consultation was carried out by verbal report and because of the 
anticoagulation and inability to do a CT angiogram because of chronic renal 
insufficiency, he was not considered a t-PA or endovascular candidate.

 

In looking through the records, this was at least his 5th if not 6th hospital 
presentation for right-sided weakness.  Last February, he was transferred to 
the [facility] because of right-sided weakness for possible 
endovascular therapy.  He was found to have a urinary tract infection and was 
simply treated for that.  He has had multiple other emergency room 
presentations and hospitalizations for right-sided weakness.  I saw him in May 
of 2017 for right-sided weakness, which I thought was chronic and perhaps 
slightly functional.  He has had numerous CAT scans, which have not revealed a 
clear cerebrovascular accident and are often contaminated by movement artifact.
  He has not been able to get an MRI scan.  I have recollection of possibly 
seeing him many years ago for the same thing.  There is no history of epilepsy.

 

PAST MEDICAL HISTORY:  Notable for chronic kidney disease; atrial fibrillation; 
possible remote stroke; obstructive sleep apnea, not on CPAP; morbid obesity; 
recurrent urinary tract infections including vancomycin-resistant E. coli and 
methicillin-resistant Staph aureus; chronic respiratory failure, on oxygen; 
history of congestive heart failure; history of benign prostatic hypertrophy.

 

MEDICATIONS:  At his nursing home include:

1.  Tamsulosin.

2.  Aspirin 81 mg p.o. q. day.

3.  Oxycodone 5 mg p.o. q.6 hours p.r.n.

4.  Roxicodone 5 mg p.o. q.a.m.

5.  Torsemide 40 mg p.o. q. day.

6.  Warfarin 2.5 mg p.o. q. day.

7.  Spironolactone 12.5 mg p.o. q. day.

 

ALLERGIES:  He does not have any drug allergies.

 

FAMILY HISTORY:  Negative for epilepsy.  Both of his parents  of 
complications of cancer.

 

SOCIAL HISTORY:  The patient used to smoke, but not for many years.  No history 
of alcohol abuse.  He was injured in [place].

 

REVIEW OF SYSTEMS:  Notable for recent weight gain, ongoing shortness of breath
, intermittent abdominal pain.  He has not been able to ambulate for more than 
6 months.  He denies fevers or chills.

 

PHYSICAL EXAM:  He is morbidly obese.  He has been afebrile throughout his 
hospital stay.  Most recent temperature 98.9, blood pressure 95/70, heart rate 
in the 70s and paced, respiratory rate is 12 and oxygen saturation is 95% on 3 
L of oxygen nasal cannula.  Lungs are clear anterolaterally, but poorly heard 
respiratory sounds.  Distant heart tone and sound normal.  I do not hear 
murmurs.  I do not hear any cervical bruits and I cannot feel his cervical 
pulses due to his thick neck.  Oral mucosa is moist and atraumatic.  Dentition 
is poor.  Neurologic Exam: Pupils react equally from 3 to 2 mm.  Eye movements 
are normal.  Facial musculature is symmetric.  Facial sensation is reported as 
diminished to light touch in the right cheek relative to the left.  Tongue 
protrudes in the midline and palate rises symmetrically.  Motor exam reveals 
normal tone in the limbs.  He has antigravity strength of all limbs, but feels 
that his right arm is heavier.  He has diminished pin discrimination in the feet
, but otherwise symmetrical sensation to pin and light touch.  Reflexes are 
trace, ankle reflexes are absent, plantar responses are flexor.  He is alert 
and oriented to person, place, and time.  He is a poor historian forgetting 
that he has had problems with right-sided weakness or thinking it might have 
started 6 months ago.  Language is generally fluent.

 

DIAGNOSTIC STUDIES/LAB DATA:  Includes urinalysis as mentioned above.  His BUN 
at presentation was 60 and creatinine 2.7.  Carbon dioxide elevated at 39.  
Glucose 107.  Cholesterol 115, LDL 67.  CBC on presentation unremarkable other 
than borderline anemia with a hematocrit of 38.

 

He had an EEG earlier today, which revealed some mild left hemisphere slowing, 
but no epileptiform discharges.

 

Multiple CTs of the brain reviewed going back several years.  There is usually 
artifact present, but there are no focal abnormalities.

 

IMPRESSION AND PLAN:  Impression is that of multiple episodes of recurrent right
-sided weakness.  He may have had a remote stroke, but there is no good 
documentation of it.  He is not able to get an MRI scan and he is already 
anticoagulated for atrial fibrillation.  His carotid ultrasound from last year 
did not reveal significant stenosis.  I suspect there may be a functional 
component or perhaps just worsening of chronic right-sided weakness when he 
gets an infection.

 

I do not see need for further cerebrovascular workup.  He cannot get an MRI 
scan or receive contrast.  I do not think he needs repeat carotid ultrasound as 
it was unremarkable a year ago as it was 2 years prior to that.

 

 [id number]/CPS #: [id number]

NICOLAS

## 2018-07-30 NOTE — PN
Subjective


Date of Service: 07/30/18


Interval History: 


HOSPITALIST PROGRESS NOTE


Patient seen and examined at bedside. Care reviewed and d/w Corinne Hunt RN.


He offers no new complaints today. States his right side weakness is improving.


Family History: Unchanged from Admission


Social History: Unchanged from Admission


Past Medical History: Unchanged from Admission





Objective


Active Medications: 








Acetaminophen (Tylenol Tab*)  650 mg PO Q6H PRN


   PRN Reason: FEVER/PAIN


Albuterol (Ventolin 2.5 Mg/3 Ml Neb.Sol*)  2.5 mg INH Q2H PRN


   PRN Reason: SOB/WHEEZING


Aspirin (Aspirin Ec Tab*)  81 mg PO DAILY [facility]


   Last Admin: 07/30/18 08:11 Dose:  81 mg


Cholecalciferol (Vitamin D Tab*)  2,000 units PO QAM [facility]


   Last Admin: 07/30/18 08:10 Dose:  2,000 units


Docusate Sodium (Colace Cap*)  100 mg PO QAM [facility]


   Last Admin: 07/30/18 08:11 Dose:  100 mg


Finasteride (Proscar Tab*)  5 mg PO DAILY [facility]


   Last Admin: 07/30/18 08:11 Dose:  5 mg


Furosemide (Lasix Iv*)  40 mg IV SLOW PU 0800,1700 [facility]


   Last Admin: 07/30/18 08:11 Dose:  40 mg


Ceftriaxone Sodium 1 gm/ (Sodium Chloride)  50 mls @ 200 mls/hr IVPB Q24H [facility]


Sodium Chloride (Ns 0.9% 1000 Ml*)  1,000 mls @ 75 mls/hr IV PER RATE [facility]


   Last Admin: 07/30/18 09:08 Dose:  75 mls/hr


Ipratropium Bromide (Atrovent 0.5 Mg Neb.Sol*)  0.5 mg INH Q6HR PRN


   PRN Reason: SHORTNESS OF BREATH


Mometasone Furoate/Formoterol Fumar (Dulera 200/5 Mdi*)  2 puff INH BID [facility]


   Last Admin: 07/30/18 09:10 Dose:  2 puff


Ondansetron HCl (Zofran Inj*)  4 mg IV Q6H PRN


   PRN Reason: NAUSEA


Oxycodone HCl (Roxycodone Tab*)  5 mg PO Q6H PRN


   PRN Reason: PAIN - UNRELIEVED


Oxycodone HCl (Roxycodone Tab*)  5 mg PO QAM [facility]


   Last Admin: 07/30/18 08:10 Dose:  5 mg


Senna (Senokot Tab*)  1 tab PO QAM [facility]


   Last Admin: 07/30/18 08:12 Dose:  1 tab


Spironolactone (Aldactone Tab*)  12.5 mg PO DAILY [facility]


   Last Admin: 07/30/18 11:43 Dose:  Not Given


Tamsulosin HCl (Flomax Cap*)  0.4 mg PO BEDTIME [facility]


   Last Admin: 07/29/18 20:07 Dose:  0.4 mg


Warfarin Sodium (Coumadin Tab(*))  2.5 mg PO 1700 [facility]; Protocol


   Last Admin: 07/29/18 20:06 Dose:  2.5 mg








 Vital Signs - 8 hr











  07/30/18 07/30/18 07/30/18





  07:51 08:10 09:15


 


Temperature 98.9 F  


 


Pulse Rate 76  70


 


Respiratory 20 20 20





Rate   


 


Blood Pressure 95/70  





(mmHg)   


 


O2 Sat by Pulse 95  92





Oximetry   














  07/30/18





  10:42


 


Temperature 


 


Pulse Rate 


 


Respiratory 20





Rate 


 


Blood Pressure 





(mmHg) 


 


O2 Sat by Pulse 





Oximetry 











Oxygen Devices in Use Now: Nasal Cannula


Appearance: Elderly, super morbid obese male lying in bed in NAD.


Eyes: No Scleral Icterus


Ears/Nose/Mouth/Throat: Mucous Membranes Moist


Neck: Trachea Midline


Respiratory: Symmetrical Chest Expansion and Respiratory Effort, Clear to 

Auscultation, - - Exam limited due to body habitus


Cardiovascular: RRR - Normal S1 and S2


Abdominal: NL Sounds; No Tenderness; No Distention - Morbid obese


Neurological: Alert and Oriented x 3, - - Right hemiparesis


Result Diagrams: 


 07/30/18 06:34





 07/30/18 06:33





Assess/Plan/Problems-Billing


Assessment: 


Mr. Culver is a 69yo M with PMH of super morbid obesity with BMI 59, CKD stage 4

, systolic CHF with EF 45%, Afib, prior CVA with residual right sided 

hemiparesis, KIM, HTN, BPH, urinary retention, chronic hypoxemic respiratory 

failure on home O2, MRSA UTI, VRE bacteremia, SNF resident, who presented to ED 

with c/o worsening of baseline right sided weakness.





- Patient Problems


(1) UTI (urinary tract infection)


Comment: - Found to have urinary retention in the ED - Vallecillo placed and urine 

described as "cloudy yellow urine with clots, mucous threads and foul smell".


- UA shows 2+ LE 2+ WBC - continue Ceftriaxone and follow urine culture.   





(2) Urinary retention


Comment: - Vallecillo placed in ED with drainage of 900ml.


- Continue Finasteride and Tamsulosin.   





(3) Hemiparesis of right dominant side as late effect of cerebral infarction


Comment: - D/w Neurology (Dr Koo) - his presentation is compatible with 

exacerbation of his chronic neurological deficits secondary to UTI.


- Dr Koo doesn't feel any further testing or change in his medication is 

indicated at this time.


- Continue Aspirin and Warfarin.   





(4) CKD (chronic kidney disease) stage 4, GFR 15-29 ml/min


Comment: - Renal function is stable.   





(5) Acute on chronic systolic (congestive) heart failure


Comment: - Echo was a limited study due to body habitus, unable to estimate EF.


- Unclear how far he is from his baseline.


- Continue Furosemide IV.   





(6) Atrial fibrillation


Comment: - Heart rate is controlled.


- Chronic AF with previous cardioembolic stroke 11/15/2016.


- Continue Warfarin.   





(7) KIM (obstructive sleep apnea)


Comment: - Continue CPAP.   





(8) DVT prophylaxis


Comment: - Warfarin.   





(9) Full code status





Status and Disposition: 


Inpatient.

## 2018-07-30 NOTE — ECHO
Patient:      YOLANDA WEAVER  

[facility] Rec#:     [id number]            :          1950          

Date:         2018            Age:          68y                 

Account#:     [id number]          Height:       188 cm / 74.0 in

Accession#:   [id number]           Weight:       209 kg / 460.6 lbs

Sex:          M                     BSA:          3.1

Room#:        446                   

Admit Date#:  2018          

Type:         Inpatient

 

Referring:    Rambo Swartz

Reading:      Liban Ly MD

Sonographer:  Elif Swift RDCS,RDMS

______________________________________________________________________

 

Transthoracic Echocardiogram

 

Indication:

CVA

BP:           120/61

HR:           72

Rhythm:       NSR

 

Findings     

History:

CVA, CHF, AFIB, AICD, CHF, HTN, morbid obesity 

 

Technical Comments:

The study is technically difficult.  The study is technically limited

due to poor acoustic windows. Images poor even with Definity contrast 

 

Left Ventricle:

The left ventricular chamber size is moderately dilated. Moderate

concentric left ventricular hypertrophy is observed. Unable to estimate

left ventricular ejection fraction.   The assessment of diastolic

function is non-diagnostic. 

 

Left Atrium:

The left atrium is moderate to severely dilated.  

 

Right Ventricle:

The right ventricular chamber size and systolic function are within

normal limits. The right ventricle wall thickness is mildly increased. A

pacemaker wire is visualized in the right ventricle. 

 

Right Atrium:

The right atrium is not well visualized. Negative bubble study on

previous Transesophageal echo (2016). 

 

Aortic Valve:

The aortic valve is trileaflet. The aortic valve leaflets are mildly

thickened. There is no evidence of aortic regurgitation. There is no

evidence of aortic stenosis. 

 

Mitral Valve:

The mitral valve structure is not well visualized. The mitral valve

leaflets are mildly thickened. There is no evidence of mitral stenosis.

 

 

Tricuspid Valve:

The tricuspid valve structure is not well visualized. The tricuspid

valve leaflets are not thickened. There is trace tricuspid

regurgitation.  Unable to estimate the right ventricular systolic

pressure.   

 

Pulmonic Valve:

There is no evidence of pulmonic valve thickening. There is no evidence

of pulmonic regurgitation. 

 

Pericardium:

There is no significant pericardial effusion. 

 

Aorta:

There is mild dilatation of the ascending aorta. The aortic arch is not

well visualized.  There is mild dilatation of the aortic root. 

 

Pulmonary Artery:

The main pulmonary artery is not well visualized. 

 

Venous:

The inferior vena cava is not visualized. 

 

Contrast:

Definity was used to optimize study.  

 

Conclusions

The study is technically limited due to poor acoustic windows. Images

poor even with Definity contrast

Moderate concentric left ventricular hypertrophy is observed.

Unable to estimate left ventricular ejection fraction.  

The right atrium is not well visualized. Negative bubble study on

previous Transesophageal echo (2016).

There is no evidence of aortic stenosis.

Unable to evaluate Mitral regurgitation

Unable to estimate the right ventricular systolic pressure.  

There is no significant pericardial effusion.

Unable to compare to previous studies

 

Measurements     

Name                    Value         Normal Range            

RVIDd (AP) 2D           3.8 cm        (0.9 - 2.6)             

IVSd (2D)               1.5 cm        (0.6 - 1)               

LVPWd (2D)              1.7 cm        (0.6 - 1)               

LVIDd (2D)              6.4 cm        (3.6 - 5.4)             

LVIDs (2D)              4.3 cm        -                        

LV FS (2D)              33 %          (25 - 45)               

Aortic Annulus          2.5 cm        (1.4 - 2.6)             

Ao root diameter (2D)   3.9 cm        (2.1 - 3.5)             

Ascending Ao            3.8 cm        (2.1 - 3.4)             

LA dimension (AP) 2D    6.5 cm        (2.3 - 3.8)             

 

Name                    Value         Normal Range            

MV E-wave Vmax          0.9 m/sec     -                        

MV deceleration time    161 msec      -                        

MV A-wave Vmax          0.3 m/sec     -                        

MV E:A ratio            3.5 ratio     -                        

 

Name                    Value         Normal Range            

AV Vmax                 1 m/sec       -                        

AV VTI                  16 cm         -                        

AV peak gradient        4 mmHg        -                        

AV mean gradient        2 mmHg        -                        

LVOT Vmax               0.8 m/sec     -                        

LVOT VTI                14 cm         -                        

LVOT peak gradient      2.6 mmHg      -                        

LVOT mean gradient      1 mmHg        -                        

 

Name                    Value         Normal Range            

MV Vmax                 0.9 m/sec     -                        

MV VTI                  17 cm         -                        

MV peak gradient        3.2 mmHg      -                        

MV mean gradient        1 mmHg        -                        

 

Name                    Value         Normal Range            

RAP                     8 mmHg        -                        

 

Name                    Value         Normal Range            

PV Vmax                 0.8 m/sec     -                        

PV peak gradient        2.6 mmHg      -                        

 

Electronically signed by: Liban Ly MD on 2018 13:13:59

## 2018-07-31 LAB — INR PPP/BLD: 2.36 (ref 0.77–1.02)

## 2018-07-31 RX ADMIN — MOMETASONE FUROATE AND FORMOTEROL FUMARATE DIHYDRATE SCH PUFF: 200; 5 AEROSOL RESPIRATORY (INHALATION) at 20:36

## 2018-07-31 RX ADMIN — MOMETASONE FUROATE AND FORMOTEROL FUMARATE DIHYDRATE SCH PUFF: 200; 5 AEROSOL RESPIRATORY (INHALATION) at 08:55

## 2018-07-31 RX ADMIN — Medication SCH UNITS: at 10:07

## 2018-07-31 RX ADMIN — FUROSEMIDE SCH MG: 10 INJECTION, SOLUTION INTRAMUSCULAR; INTRAVENOUS at 10:10

## 2018-07-31 RX ADMIN — OXYCODONE HYDROCHLORIDE SCH MG: 5 CAPSULE ORAL at 10:06

## 2018-07-31 RX ADMIN — DOCUSATE SODIUM SCH MG: 100 CAPSULE, LIQUID FILLED ORAL at 10:05

## 2018-07-31 RX ADMIN — TAMSULOSIN HYDROCHLORIDE SCH MG: 0.4 CAPSULE ORAL at 21:41

## 2018-07-31 RX ADMIN — ASPIRIN SCH MG: 81 TABLET, COATED ORAL at 10:06

## 2018-07-31 RX ADMIN — SPIRONOLACTONE SCH MG: 25 TABLET ORAL at 10:05

## 2018-07-31 RX ADMIN — SENNOSIDES SCH TAB: 8.6 TABLET, FILM COATED ORAL at 10:06

## 2018-07-31 RX ADMIN — WARFARIN SODIUM SCH MG: 2.5 TABLET ORAL at 17:26

## 2018-07-31 RX ADMIN — CEFTRIAXONE SODIUM SCH MLS/HR: 1 INJECTION, POWDER, FOR SOLUTION INTRAVENOUS at 14:58

## 2018-07-31 RX ADMIN — FINASTERIDE SCH MG: 5 TABLET, FILM COATED ORAL at 10:07

## 2018-07-31 NOTE — PN
Subjective


Date of Service: 07/31/18


Interval History: 





No overnight events.  He feels okay today but is unsure if he is still short of 

breath.  His right arm is getting better.  No pain.  Urine is still cloudy and 

he says he sometimes gets the urge to pee even though the alexandre is in place.


Family History: Unchanged from Admission


Social History: Unchanged from Admission


Past Medical History: Unchanged from Admission





Objective


Active Medications: 








Acetaminophen (Tylenol Tab*)  650 mg PO Q6H PRN


   PRN Reason: FEVER/PAIN


Albuterol (Ventolin 2.5 Mg/3 Ml Neb.Sol*)  2.5 mg INH Q2H PRN


   PRN Reason: SOB/WHEEZING


Aspirin (Aspirin Ec Tab*)  81 mg PO DAILY [facility]


   Last Admin: 07/31/18 10:06 Dose:  81 mg


Cholecalciferol (Vitamin D Tab*)  2,000 units PO QAM [facility]


   Last Admin: 07/31/18 10:07 Dose:  2,000 units


Docusate Sodium (Colace Cap*)  100 mg PO QAM [facility]


   Last Admin: 07/31/18 10:05 Dose:  100 mg


Finasteride (Proscar Tab*)  5 mg PO DAILY [facility]


   Last Admin: 07/31/18 10:07 Dose:  5 mg


Furosemide (Lasix Iv*)  40 mg IV SLOW PU DAILY [facility]


   Last Admin: 07/31/18 10:10 Dose:  40 mg


Ceftriaxone Sodium 1 gm/ (Sodium Chloride)  50 mls @ 200 mls/hr IVPB Q24H [facility]


   Last Admin: 07/31/18 14:58 Dose:  200 mls/hr


Ipratropium Bromide (Atrovent 0.5 Mg Neb.Sol*)  0.5 mg INH Q6HR PRN


   PRN Reason: SHORTNESS OF BREATH


Mometasone Furoate/Formoterol Fumar (Dulera 200/5 Mdi*)  2 puff INH BID [facility]


   Last Admin: 07/31/18 08:55 Dose:  2 puff


Ondansetron HCl (Zofran Inj*)  4 mg IV Q6H PRN


   PRN Reason: NAUSEA


Oxycodone HCl (Roxycodone Tab*)  5 mg PO Q6H PRN


   PRN Reason: PAIN - UNRELIEVED


Oxycodone HCl (Roxycodone Tab*)  5 mg PO QAM [facility]


   Last Admin: 07/31/18 10:06 Dose:  5 mg


Senna (Senokot Tab*)  1 tab PO QAM [facility]


   Last Admin: 07/31/18 10:06 Dose:  1 tab


Spironolactone (Aldactone Tab*)  12.5 mg PO DAILY [facility]


   Last Admin: 07/31/18 10:05 Dose:  12.5 mg


Tamsulosin HCl (Flomax Cap*)  0.4 mg PO BEDTIME [facility]


   Last Admin: 07/30/18 21:29 Dose:  0.4 mg


Warfarin Sodium (Coumadin Tab(*))  2.5 mg PO 1700 [facility]; Protocol


   Last Admin: 07/30/18 19:24 Dose:  2.5 mg








 Vital Signs - 8 hr











  07/31/18 07/31/18 07/31/18





  10:06 15:31 15:42


 


Temperature   98.0 F


 


Pulse Rate   71


 


Respiratory 20 18 20





Rate   


 


Blood Pressure   108/52





(mmHg)   


 


O2 Sat by Pulse   97





Oximetry   











Oxygen Devices in Use Now: Nasal Cannula


Appearance: morbidly obese man lying in bed in no distress, slightly tachypneic 

with talking


Eyes: No Scleral Icterus


Ears/Nose/Mouth/Throat: NL Teeth, Lips, Gums


Neck: - - I cannot see his jugular vein


Respiratory: Symmetrical Chest Expansion and Respiratory Effort


Cardiovascular: RRR


Abdominal: NL Sounds; No Tenderness; No Distention, -


Lymphatic: No Cervical Adenopathy


Extremities: No Edema


Skin: No Rash or Ulcers


Neurological: - - right upper extremity 4/5


Result Diagrams: 


 07/30/18 06:34





 07/30/18 06:33


Additional Lab and Data: 


 Lab Results











  07/29/18 07/29/18 07/29/18 Range/Units





  12:23 12:31 12:31 


 


WBC   6.9   (3.5-10.8)  10^3/ul


 


RBC   4.77   (4.00-5.40)  10^6/ul


 


Hgb   12.4 L   (14.0-18.0)  g/dl


 


Hct   38 L   (42-52)  %


 


MCV   80   (80-94)  fL


 


MCH   26 L   (27-31)  pg


 


MCHC   33   (31-36)  g/dl


 


RDW   16 H   (10.5-15)  %


 


Plt Count   199   (150-450)  10^3/ul


 


MPV   7.7   (7.4-10.4)  um3


 


Neut % (Auto)   84.5 H   (38-83)  %


 


Lymph % (Auto)   7.2 L   (25-47)  %


 


Mono % (Auto)   5.1   (0-7)  %


 


Eos % (Auto)   2.6   (0-6)  %


 


Baso % (Auto)   0.6   (0-2)  %


 


Absolute Neuts (auto)   5.8   (1.5-7.7)  10^3/ul


 


Absolute Lymphs (auto)   0.5 L   (1.0-4.8)  10^3/ul


 


Absolute Monos (auto)   0.4   (0-0.8)  10^3/ul


 


Absolute Eos (auto)   0.2   (0-0.6)  10^3/ul


 


Absolute Basos (auto)   0   (0-0.2)  10^3/ul


 


Absolute Nucleated RBC   0   10^3/ul


 


Nucleated RBC %   0.1   


 


INR (Anticoag Therapy)    2.18 H  (0.77-1.02)  


 


APTT    36.4 H  (26.0-36.3)  seconds


 


Sodium     (135-145)  mmol/L


 


Potassium     (3.5-5.0)  mmol/L


 


Chloride     (101-111)  mmol/L


 


Carbon Dioxide     (22-32)  mmol/L


 


Anion Gap     (2-11)  mmol/L


 


BUN     (6-24)  mg/dL


 


Creatinine     (0.67-1.17)  mg/dL


 


Est GFR ( Amer)     (>60)  


 


Est GFR (Non-Af Amer)     (>60)  


 


BUN/Creatinine Ratio     (8-20)  


 


Glucose     ()  mg/dL


 


POC Glucose (mg/dL)  117 H    ()  mg/dL


 


Lactic Acid     (0.5-2.0)  mmol/L


 


Calcium     (8.6-10.3)  mg/dL


 


Total Bilirubin     (0.2-1.0)  mg/dL


 


AST     (13-39)  U/L


 


ALT     (7-52)  U/L


 


Alkaline Phosphatase     ()  U/L


 


Troponin I     (<0.04)  ng/mL


 


Total Protein     (6.4-8.9)  g/dL


 


Albumin     (3.2-5.2)  g/dL


 


Globulin     (2-4)  g/dL


 


Albumin/Globulin Ratio     (1-3)  


 


Triglycerides     mg/dL


 


Cholesterol     mg/dL


 


LDL Cholesterol     mg/dL


 


HDL Cholesterol     mg/dL


 


Urine Color     


 


Urine Appearance     


 


Urine pH     (5-9)  


 


Ur Specific Gravity     (1.010-1.030)  


 


Urine Protein     (Negative)  


 


Urine Ketones     (Negative)  


 


Urine Blood     (Negative)  


 


Urine Nitrate     (Negative)  


 


Urine Bilirubin     (Negative)  


 


Urine Urobilinogen     (Negative)  


 


Ur Leukocyte Esterase     (Negative)  


 


Urine WBC (Auto)     (Absent)  


 


Urine RBC (Auto)     (Absent)  


 


Ur Squamous Epith Cells     (Absent)  


 


Urine Bacteria     (Absent)  


 


Urine Glucose     (Negative)  


 


Blood Type     


 


Antibody Screen     














  07/29/18 07/29/18 07/29/18 Range/Units





  12:31 12:31 12:31 


 


WBC     (3.5-10.8)  10^3/ul


 


RBC     (4.00-5.40)  10^6/ul


 


Hgb     (14.0-18.0)  g/dl


 


Hct     (42-52)  %


 


MCV     (80-94)  fL


 


MCH     (27-31)  pg


 


MCHC     (31-36)  g/dl


 


RDW     (10.5-15)  %


 


Plt Count     (150-450)  10^3/ul


 


MPV     (7.4-10.4)  um3


 


Neut % (Auto)     (38-83)  %


 


Lymph % (Auto)     (25-47)  %


 


Mono % (Auto)     (0-7)  %


 


Eos % (Auto)     (0-6)  %


 


Baso % (Auto)     (0-2)  %


 


Absolute Neuts (auto)     (1.5-7.7)  10^3/ul


 


Absolute Lymphs (auto)     (1.0-4.8)  10^3/ul


 


Absolute Monos (auto)     (0-0.8)  10^3/ul


 


Absolute Eos (auto)     (0-0.6)  10^3/ul


 


Absolute Basos (auto)     (0-0.2)  10^3/ul


 


Absolute Nucleated RBC     10^3/ul


 


Nucleated RBC %     


 


INR (Anticoag Therapy)     (0.77-1.02)  


 


APTT     (26.0-36.3)  seconds


 


Sodium  141    (135-145)  mmol/L


 


Potassium  4.0    (3.5-5.0)  mmol/L


 


Chloride  98 L    (101-111)  mmol/L


 


Carbon Dioxide  39 H    (22-32)  mmol/L


 


Anion Gap  4    (2-11)  mmol/L


 


BUN  60 H    (6-24)  mg/dL


 


Creatinine  2.73 H    (0.67-1.17)  mg/dL


 


Est GFR ( Amer)  28.1    (>60)  


 


Est GFR (Non-Af Amer)  23.2    (>60)  


 


BUN/Creatinine Ratio  22.0 H    (8-20)  


 


Glucose  107 H    ()  mg/dL


 


POC Glucose (mg/dL)     ()  mg/dL


 


Lactic Acid   1.0   (0.5-2.0)  mmol/L


 


Calcium  8.7    (8.6-10.3)  mg/dL


 


Total Bilirubin  0.40    (0.2-1.0)  mg/dL


 


AST  9 L    (13-39)  U/L


 


ALT  6 L    (7-52)  U/L


 


Alkaline Phosphatase  84    ()  U/L


 


Troponin I  0.01    (<0.04)  ng/mL


 


Total Protein  6.8    (6.4-8.9)  g/dL


 


Albumin  3.2    (3.2-5.2)  g/dL


 


Globulin  3.6    (2-4)  g/dL


 


Albumin/Globulin Ratio  0.9 L    (1-3)  


 


Triglycerides  107    mg/dL


 


Cholesterol  115    mg/dL


 


LDL Cholesterol  67    mg/dL


 


HDL Cholesterol  27.1    mg/dL


 


Urine Color     


 


Urine Appearance     


 


Urine pH     (5-9)  


 


Ur Specific Gravity     (1.010-1.030)  


 


Urine Protein     (Negative)  


 


Urine Ketones     (Negative)  


 


Urine Blood     (Negative)  


 


Urine Nitrate     (Negative)  


 


Urine Bilirubin     (Negative)  


 


Urine Urobilinogen     (Negative)  


 


Ur Leukocyte Esterase     (Negative)  


 


Urine WBC (Auto)     (Absent)  


 


Urine RBC (Auto)     (Absent)  


 


Ur Squamous Epith Cells     (Absent)  


 


Urine Bacteria     (Absent)  


 


Urine Glucose     (Negative)  


 


Blood Type    O Negative  


 


Antibody Screen    Negative  














  07/29/18 Range/Units





  12:43 


 


WBC   (3.5-10.8)  10^3/ul


 


RBC   (4.00-5.40)  10^6/ul


 


Hgb   (14.0-18.0)  g/dl


 


Hct   (42-52)  %


 


MCV   (80-94)  fL


 


MCH   (27-31)  pg


 


MCHC   (31-36)  g/dl


 


RDW   (10.5-15)  %


 


Plt Count   (150-450)  10^3/ul


 


MPV   (7.4-10.4)  um3


 


Neut % (Auto)   (38-83)  %


 


Lymph % (Auto)   (25-47)  %


 


Mono % (Auto)   (0-7)  %


 


Eos % (Auto)   (0-6)  %


 


Baso % (Auto)   (0-2)  %


 


Absolute Neuts (auto)   (1.5-7.7)  10^3/ul


 


Absolute Lymphs (auto)   (1.0-4.8)  10^3/ul


 


Absolute Monos (auto)   (0-0.8)  10^3/ul


 


Absolute Eos (auto)   (0-0.6)  10^3/ul


 


Absolute Basos (auto)   (0-0.2)  10^3/ul


 


Absolute Nucleated RBC   10^3/ul


 


Nucleated RBC %   


 


INR (Anticoag Therapy)   (0.77-1.02)  


 


APTT   (26.0-36.3)  seconds


 


Sodium   (135-145)  mmol/L


 


Potassium   (3.5-5.0)  mmol/L


 


Chloride   (101-111)  mmol/L


 


Carbon Dioxide   (22-32)  mmol/L


 


Anion Gap   (2-11)  mmol/L


 


BUN   (6-24)  mg/dL


 


Creatinine   (0.67-1.17)  mg/dL


 


Est GFR ( Amer)   (>60)  


 


Est GFR (Non-Af Amer)   (>60)  


 


BUN/Creatinine Ratio   (8-20)  


 


Glucose   ()  mg/dL


 


POC Glucose (mg/dL)   ()  mg/dL


 


Lactic Acid   (0.5-2.0)  mmol/L


 


Calcium   (8.6-10.3)  mg/dL


 


Total Bilirubin   (0.2-1.0)  mg/dL


 


AST   (13-39)  U/L


 


ALT   (7-52)  U/L


 


Alkaline Phosphatase   ()  U/L


 


Troponin I   (<0.04)  ng/mL


 


Total Protein   (6.4-8.9)  g/dL


 


Albumin   (3.2-5.2)  g/dL


 


Globulin   (2-4)  g/dL


 


Albumin/Globulin Ratio   (1-3)  


 


Triglycerides   mg/dL


 


Cholesterol   mg/dL


 


LDL Cholesterol   mg/dL


 


HDL Cholesterol   mg/dL


 


Urine Color  Yellow  


 


Urine Appearance  Cloudy  


 


Urine pH  7.0  (5-9)  


 


Ur Specific Gravity  1.010  (1.010-1.030)  


 


Urine Protein  1+(30 mg/dl) A  (Negative)  


 


Urine Ketones  Negative  (Negative)  


 


Urine Blood  1+ A  (Negative)  


 


Urine Nitrate  Negative  (Negative)  


 


Urine Bilirubin  Negative  (Negative)  


 


Urine Urobilinogen  Negative  (Negative)  


 


Ur Leukocyte Esterase  2+ A  (Negative)  


 


Urine WBC (Auto)  2+(11-20/hpf) A  (Absent)  


 


Urine RBC (Auto)  1+(3-5/hpf) A  (Absent)  


 


Ur Squamous Epith Cells  Present A  (Absent)  


 


Urine Bacteria  Absent  (Absent)  


 


Urine Glucose  Negative  (Negative)  


 


Blood Type   


 


Antibody Screen   











Microbiology and Other Data: 


 Microbiology











 07/29/18 16:34 Aerobic Blood Culture - Preliminary





 Blood Venous    No Growth Day 2





 Anaerobic Blood Culture - Preliminary





    No Growth Day 2


 


 07/29/18 12:43 Urine Culture - Final





 Urine 


 


 07/30/18 11:09 Nasal Screen MRSA (PCR) - Final





 Nasal    Mrsa Not Detected














Assess/Plan/Problems-Billing


Assessment: 


Mr. Culver is a 69yo M with PMH of super morbid obesity with BMI 59, CKD stage 4

, systolic CHF with EF 45%, Afib, prior CVA with residual right sided 

hemiparesis, KIM, HTN, BPH, urinary retention, chronic hypoxemic respiratory 

failure on home O2, MRSA UTI, VRE bacteremia, SNF resident, who presented to ED 

with c/o worsening of baseline right sided weakness.





- Patient Problems


(1) Acute on chronic systolic (congestive) heart failure


Current Visit: Yes   Status: Acute   Priority: Medium   Code(s): I50.23 - ACUTE 

ON CHRONIC SYSTOLIC (CONGESTIVE) HEART FAILURE   SNOMED Code(s): 277349017


   Comment: - Echo was a limited study due to body habitus, unable to estimate 

EF.


- Continue Furosemide IV today for goal >1L negative today.   





(2) Atrial fibrillation


Current Visit: Yes   Status: Acute   Priority: Medium   Code(s): I48.91 - 

UNSPECIFIED ATRIAL FIBRILLATION   SNOMED Code(s): 74086045


   Comment: rate controlled


history of cardioembolic stroke 11/15/2016


Continue Warfarin, therapeutic   





(3) CKD (chronic kidney disease) stage 4, GFR 15-29 ml/min


Current Visit: Yes   Status: Acute   Code(s): N18.4 - CHRONIC KIDNEY DISEASE, 

STAGE 4 (SEVERE)   SNOMED Code(s): 116200031


   Comment: at baseline   





(4) Hemiparesis of right dominant side as late effect of cerebral infarction


Current Visit: Yes   Status: Acute   Code(s): I69.351 - HEMIPLGA FOLLOWING 

CEREBRAL INFRC AFF RIGHT DOMINANT SIDE   SNOMED Code(s): 082136497


   Comment: improving


likely recrudescence of prior CVA in setting of UTI


Continue Aspirin and Warfarin.   





(5) KIM (obstructive sleep apnea)


Current Visit: Yes   Status: Acute   Priority: Medium   Code(s): G47.33 - 

OBSTRUCTIVE SLEEP APNEA (ADULT) (PEDIATRIC)   SNOMED Code(s): 07730431


   Comment: - Continue CPAP.   





(6) Obesity hypoventilation syndrome


Current Visit: Yes   Status: Acute   Priority: High   Code(s): E66.2 - MORBID (

SEVERE) OBESITY WITH ALVEOLAR HYPOVENTILATION   SNOMED Code(s): 061695685


   Comment: 


BiPAP


restrictive lung disease   





(7) Urinary tract infection with hematuria


Current Visit: No   Status: Acute   Priority: High   Code(s): N39.0 - URINARY 

TRACT INFECTION, SITE NOT SPECIFIED; R31.9 - HEMATURIA, UNSPECIFIED   SNOMED 

Code(s): 62242207


   Comment: urine culture showed no growth


repeat   





(8) BPH (benign prostatic hyperplasia)


Current Visit: No   Status: Chronic   Priority: Medium   Code(s): N40.0 - 

BENIGN PROSTATIC HYPERPLASIA WITHOUT LOWER URINRY TRACT SYMP   SNOMED Code(s): 

855960173


   Comment: Continue tamsulosin   





(9) Acute urinary retention


Current Visit: Yes   Status: Acute   Code(s): R33.8 - OTHER RETENTION OF URINE 

  SNOMED Code(s): 41949


   Comment: will need to be discharged with a alexandre 


continue flomax and will arrange urology follow up   


Status and Disposition: 


Inpatient.

## 2018-07-31 NOTE — EEG
ELECTROENCEPHALOGRAM REPORT:

 

DATE OF STUDY:  07/30/18.

 

REFERRING PROVIDER:  MARY KATE Curran.

 

LOCATION:  He is an inpatient, in room 446.

 

CLINICAL PROBLEM:  Transient episode of right-sided weakness for several hours 
the day prior to this recording.  The patient has a prior history of a stroke 
affecting his right side.

 

MEDICATIONS:  Include:

1.  Tamsulosin.

2.  Warfarin.

3.  Ceftriaxone.

4.  Oxycodone.

5.  Dulera.

6.  Furosemide.

 

REPORT:  This 16-channel EEG is remarkable for background rhythms at the onset 
of the tracing consisting of a borderline alpha rhythm in the occipital 
derivations on the right at about 7 to 7.5 cycles per second.  Slower rhythms 
are seen from the left posterior hemisphere in the theta and delta range.  The 
patient is drowsy and intermittently sleeps through portions of the recording 
with vertex slowing and sleep spindles seen parasagittally.  Slow rhythms 
continued to predominate from the left hemisphere.  There is an occasional 
movement artifact and one episode of arm jerking, which only resulted in 
movement artifact.  Activation procedures are not attempted.  There are no 
focal or epileptiform abnormalities.

 

CLINICAL IMPRESSION:  Abnormal EEG due to diffuse slowing in the left 
hemisphere consistent with focal pathology in that distribution.  There are no 
epileptiform discharges during this recording.

 

[id number]/CPS #: [id number]

[facility]

## 2018-08-01 LAB — INR PPP/BLD: 1.98 (ref 0.77–1.02)

## 2018-08-01 RX ADMIN — MOMETASONE FUROATE AND FORMOTEROL FUMARATE DIHYDRATE SCH PUFF: 200; 5 AEROSOL RESPIRATORY (INHALATION) at 08:19

## 2018-08-01 RX ADMIN — FINASTERIDE SCH MG: 5 TABLET, FILM COATED ORAL at 07:44

## 2018-08-01 RX ADMIN — CEFTRIAXONE SODIUM SCH MLS/HR: 1 INJECTION, POWDER, FOR SOLUTION INTRAVENOUS at 14:03

## 2018-08-01 RX ADMIN — SENNOSIDES SCH TAB: 8.6 TABLET, FILM COATED ORAL at 07:43

## 2018-08-01 RX ADMIN — Medication SCH UNITS: at 07:44

## 2018-08-01 RX ADMIN — WARFARIN SODIUM SCH MG: 2.5 TABLET ORAL at 17:17

## 2018-08-01 RX ADMIN — TAMSULOSIN HYDROCHLORIDE SCH MG: 0.4 CAPSULE ORAL at 21:54

## 2018-08-01 RX ADMIN — OXYCODONE HYDROCHLORIDE SCH MG: 5 CAPSULE ORAL at 07:43

## 2018-08-01 RX ADMIN — MOMETASONE FUROATE AND FORMOTEROL FUMARATE DIHYDRATE SCH PUFF: 200; 5 AEROSOL RESPIRATORY (INHALATION) at 20:56

## 2018-08-01 RX ADMIN — SPIRONOLACTONE SCH MG: 25 TABLET ORAL at 07:44

## 2018-08-01 RX ADMIN — FUROSEMIDE SCH MG: 10 INJECTION, SOLUTION INTRAMUSCULAR; INTRAVENOUS at 07:43

## 2018-08-01 RX ADMIN — ASPIRIN SCH MG: 81 TABLET, COATED ORAL at 07:44

## 2018-08-01 RX ADMIN — DOCUSATE SODIUM SCH MG: 100 CAPSULE, LIQUID FILLED ORAL at 07:44

## 2018-08-01 NOTE — PN
Subjective


Date of Service: 08/01/18


Interval History: 





Patient seen and examined at bedside. Denies fever, chills, shortness of breath

, chest discomfort, N/V/D. Pt states that feeling like he has to urinate with 

the alexandre has resolved. He also feels like the weakness in his right arm is 

improving. He reports right shoulder pain for a few weeks. He states that he is 

on oxygen at home and is on his baseline level. 





Pt states that according to [facility] he has gained 30 pounds in the last week 

prior to admission.





Tele: Paced, rate 70's








Family History: Unchanged from Admission


Social History: Unchanged from Admission


Past Medical History: Unchanged from Admission





Objective


Active Medications: 





Acetaminophen (Tylenol Tab*)  650 mg PO Q6H PRN Reason: FEVER/PAIN


Albuterol (Ventolin 2.5 Mg/3 Ml Neb.Sol*)  2.5 mg INH Q2H PRN Reason: SOB/

WHEEZING


Aspirin (Aspirin Ec Tab*)  81 mg PO DAILY [facility]


Cholecalciferol (Vitamin D Tab*)  2,000 units PO QAM [facility]


Docusate Sodium (Colace Cap*)  100 mg PO QAM [facility]


Finasteride (Proscar Tab*)  5 mg PO DAILY [facility]


Furosemide (Lasix Iv*)  40 mg IV SLOW PU DAILY [facility]


Ceftriaxone Sodium 1 gm/ (Sodium Chloride)  50 mls @ 200 mls/hr IVPB Q24H [facility]


Ipratropium Bromide (Atrovent 0.5 Mg Neb.Sol*)  0.5 mg INH Q6HR PRN Reason: 

SHORTNESS OF BREATH


Mometasone Furoate/Formoterol Fumar (Dulera 200/5 Mdi*)  2 puff INH BID [facility]


Ondansetron HCl (Zofran Inj*)  4 mg IV Q6H PRN Reason: NAUSEA


Oxycodone HCl (Roxycodone Tab*)  5 mg PO Q6H PRN Reason: PAIN - UNRELIEVED


Oxycodone HCl (Roxycodone Tab*)  5 mg PO QAM [facility]


Senna (Senokot Tab*)  1 tab PO QAM [facility]


Spironolactone (Aldactone Tab*)  12.5 mg PO DAILY [facility]


Tamsulosin HCl (Flomax Cap*)  0.4 mg PO BEDTIME [facility]


Warfarin Sodium (Coumadin Tab(*))  2.5 mg PO 1700 [facility]; Protocol





 Vital Signs - 8 hr











  08/01/18 08/01/18 08/01/18





  07:43 08:01 08:16


 


Temperature   98.5 F


 


Pulse Rate   74


 


Respiratory 20 20 20





Rate   


 


Blood Pressure   123/45





(mmHg)   


 


O2 Sat by Pulse   96





Oximetry   














  08/01/18





  08:21


 


Temperature 


 


Pulse Rate 78


 


Respiratory 19





Rate 


 


Blood Pressure 





(mmHg) 


 


O2 Sat by Pulse 96





Oximetry 











Oxygen Devices in Use Now: Nasal Cannula - 3L


Appearance: NAD, laying in bed


Ears/Nose/Mouth/Throat: Mucous Membranes Moist


Respiratory: Symmetrical Chest Expansion and Respiratory Effort, Clear to 

Auscultation


Cardiovascular: NL Sounds; No Murmurs; No JVD, RRR


Abdominal: NL Sounds; No Tenderness; No Distention - , Abdomen large


Extremities: No Edema


Skin: No Rash or Ulcers


Neurological: Alert and Oriented x 3, NL Muscle Strength and Tone


Lines/Tubes/Other Access: Clean, Dry and Intact Peripheral IV - site benign


Nutrition: Taking PO's


Result Diagrams: 


 07/30/18 06:34





 07/30/18 06:33


Additional Lab and Data: 


.


Microbiology and Other Data: 


 Microbiology











 07/29/18 16:34 Aerobic Blood Culture - Preliminary





 Blood Venous    No Growth Day 2





 Anaerobic Blood Culture - Preliminary





    No Growth Day 2


 


 07/29/18 12:43 Urine Culture - Final





 Urine 


 


 07/30/18 11:09 Nasal Screen MRSA (PCR) - Final





 Nasal    Mrsa Not Detected














Assess/Plan/Problems-Billing


Assessment: 





Mr. Culver is a 67yo M with PMH of super morbid obesity with BMI 59, CKD stage 4

, systolic CHF with EF 45%, Afib, prior CVA with residual right sided 

hemiparesis, KIM, HTN, BPH, urinary retention, chronic hypoxemic respiratory 

failure on home O2, MRSA UTI, VRE bacteremia, SNF resident, who presented to ED 

with c/o worsening of baseline right sided weakness.











- Patient Problems


(1) Acute on chronic systolic (congestive) heart failure


Code(s): I50.23 - ACUTE ON CHRONIC SYSTOLIC (CONGESTIVE) HEART FAILURE   SNOMED 

Code(s): 297918273


   Comment: 


- Echo was a limited study due to body habitus, unable to estimate EF


- Last EF in 2017 was 45%


- Continue strict I+O's and daily weight


- Continue Furosemide IV, may be able to change to PO Lasix in the AM   





(2) Acute urinary retention


Code(s): R33.8 - OTHER RETENTION OF URINE   SNOMED Code(s): 549417675


   Comment: 


- Plan to discharge with a alexandre 


- Continue flomax


- Will need urology follow up at discharge   





(3) Urinary tract infection with hematuria


Code(s): N39.0 - URINARY TRACT INFECTION, SITE NOT SPECIFIED; R31.9 - HEMATURIA

, UNSPECIFIED   SNOMED Code(s): 80976498


   Comment: 


- No leukocytosis and afebrile


- Urine culture showed no growth


- Will discontinue ceftriaxone   





(4) Atrial fibrillation


Code(s): I48.91 - UNSPECIFIED ATRIAL FIBRILLATION   SNOMED Code(s): 20401476


   Comment: 


- Rate controlled


- History of cardioembolic stroke 11/2016


- Continue Warfarin, therapeutic   





(5) CKD (chronic kidney disease) stage 4, GFR 15-29 ml/min


Code(s): N18.4 - CHRONIC KIDNEY DISEASE, STAGE 4 (SEVERE)   SNOMED Code(s): 

309353759


   Comment: 


- At baseline   





(6) Hemiparesis of right dominant side as late effect of cerebral infarction


Code(s): I69.351 - HEMIPLGA FOLLOWING CEREBRAL INFRC AFF RIGHT DOMINANT SIDE   

SNOMED Code(s): 777883391


   Comment: 


- Improving


- Likely recrudescence of prior CVA in setting of UTI


- Neurology consult, input appreciated


- Continue Aspirin and Warfarin   





(7) KIM (obstructive sleep apnea)


Code(s): G47.33 - OBSTRUCTIVE SLEEP APNEA (ADULT) (PEDIATRIC)   SNOMED Code(s): 

95459262


   Comment: 


- Continue CPAP   





(8) Obesity hypoventilation syndrome


Code(s): E66.2 - MORBID (SEVERE) OBESITY WITH ALVEOLAR HYPOVENTILATION   SNOMED 

Code(s): 281220646


   Comment: 


- Restrictive lung disease


- CPAP


   





(9) BPH (benign prostatic hyperplasia)


Code(s): N40.0 - BENIGN PROSTATIC HYPERPLASIA WITHOUT LOWER URINRY TRACT SYMP   

SNOMED Code(s): 832284679


   Comment: 


- Continue tamsulosin   





(10) DVT prophylaxis


Code(s): [id number] -    SNOMED Code(s): 386478428


   Comment: 


- Warfarin   





(11) Full code status


Code(s): Z78.9 - OTHER SPECIFIED HEALTH STATUS   SNOMED Code(s): 618199742


   


Status and Disposition: 





Inpatient. Discharge to [facility] when medically stable.





Attending: Carmela York

## 2018-08-02 VITALS — SYSTOLIC BLOOD PRESSURE: 127 MMHG | DIASTOLIC BLOOD PRESSURE: 48 MMHG

## 2018-08-02 RX ADMIN — FUROSEMIDE SCH MG: 10 INJECTION, SOLUTION INTRAMUSCULAR; INTRAVENOUS at 09:02

## 2018-08-02 RX ADMIN — ASPIRIN SCH MG: 81 TABLET, COATED ORAL at 09:00

## 2018-08-02 RX ADMIN — SPIRONOLACTONE SCH MG: 25 TABLET ORAL at 09:01

## 2018-08-02 RX ADMIN — MOMETASONE FUROATE AND FORMOTEROL FUMARATE DIHYDRATE SCH PUFF: 200; 5 AEROSOL RESPIRATORY (INHALATION) at 09:39

## 2018-08-02 RX ADMIN — OXYCODONE HYDROCHLORIDE SCH MG: 5 CAPSULE ORAL at 09:00

## 2018-08-02 RX ADMIN — FINASTERIDE SCH MG: 5 TABLET, FILM COATED ORAL at 09:02

## 2018-08-02 RX ADMIN — Medication SCH UNITS: at 09:02

## 2018-08-02 RX ADMIN — DOCUSATE SODIUM SCH MG: 100 CAPSULE, LIQUID FILLED ORAL at 09:02

## 2018-08-02 RX ADMIN — SENNOSIDES SCH TAB: 8.6 TABLET, FILM COATED ORAL at 09:02

## 2018-08-02 NOTE — DS
CC:  Karthikeyan *

 

DATE OF ADMISSION:  07/29/2018.

 

DATE OF DISCHARGE:  08/02/2018.

 

ATTENDING PHYSICIAN:  Dr. Kavin Burt * (dictation provided by Alexandra Cifuentes NP
).

 

PRIMARY DIAGNOSES:

1.  Urinary tract infection with associated urinary retention, now with Vallecillo 
catheter.

2.  Right upper extremity weakness, likely reflective of recrudescence of prior 
CVA in the setting of UTI.

3.  Acute on chronic systolic congestive heart failure, resolved.

 

SECONDARY DIAGNOSES:

1.  Chronic kidney disease, stage 4.

2.  Chronic systolic congestive heart failure.

3.  CVA.

4.  A-fib.

5.  Obstructive sleep apnea.

6.  Obesity hypoventilation syndrome.

7.  Hypertension.

8.  BPH.

9.  History of urinary retention.

10. Anemia.

11. Morbid obesity.

12. History of MRSA UTI and VRE bacteremia.

13. Chronic hypoxic respiratory failure on 3 liters oxygen.

 

MEDICATIONS:

1.  Warfarin 2.5 mg p.o. daily.

2.  Spironolactone 12.5 mg p.o. daily.

3.  Oxycodone 5 mg p.o. q.6 hours prn.

4.  Albuterol 2.5 mg inhaled q.2 hours prn.

5.  Ipratropium 0.5 mg inhaled q.6 hours prn.

6.  Tylenol 650 mg p.o. q.8 hours prn.

7.  Advair 250/50 one puff inhaled b.i.d.

8.  Tamsulosin 0.4 mg p.o. at bedtime.

9.  Senna with Docusate one tab p.o. q.a.m.

10. Aspirin 81 mg p.o. daily.

11. Tylenol 650 mg p.o. at bedtime.

12. Oxycodone 5 mg p.o. q.a.m.

13. Torsemide 40 mg p.o. daily.

14. Cholecalciferol 2,000 units p.o. q.a.m.

15. Finasteride 5 mg p.o. daily.

16. Cefpodoxime 200 mg p.o. q.12 hours times 10 days.

 

HOSPITAL COURSE:  Mr. Culver is a 68-year-old male with a past medical history 
as outlined above who presented to the emergency room on 07/29/2018 with 
concern for right-sided weakness. Please see the dictated history and physical 
from MARY KATE Curran for complete details.  In brief, the patient had a 
history of a previous CVA with residual deficit of right-sided weakness.  The 
patient intermittently had worsening of his right-sided weakness in the setting 
of infectious or stress.  The patient had noted worsening of the right upper 
arm weakness and therefore came to the emergency room for evaluation.  The 
patient was not deemed to be a TPA candidate in consultation with the 
Telestroke provider from [place].  In the emergency room, the patient was 
found to have urinary retention and a Vallecillo was placed for 700 ml of foul 
smelling purulent urine.  The patient had been on Nitrofurantoin outpatient for 
suspected urinary tract infection.  The urine culture showed no growth, but I 
will note that this was in the setting of outpatient treatment with 
antibiotics.  The patient has a history of frequent previous urinary tract 
infections.

 

The remainder of Mr. Chen work-up was negative.  He was seen in consultation 
by Dr. Koo from Neurology for his worsening right-sided weakness in the 
setting of infection.  The patient was not able to obtain an MRI due to his 
body habitus and he is already anticoagulated for his atrial fibrillation, so 
any further work-up was not required. He had a CT brain which was read as "no 
acute intracranial finding."  



He did have a chest x-ray was read as follows:  "Negative examination, 
cardiomegaly with suspected interstitial edema and left sided infiltrate versus 
atelectasis versus pleural fluid.  He also had a transthoracic echocardiogram 
which was technically limited due to poor acoustic windows and therefore was 
unable to provide any meaningful results.  He had an EEG which showed an 
"abnormal EEG due to diffuse slowing in the left hemisphere consistent with 
focal pathology in that distribution, there are no epileptiform discharges 
during the recording."

 

Mr. Chen treatment included Ceftriaxone for treatment of urinary tract 
infection. I am switching him over to Vantin 200 p.o. b.i.d. times ten days for 
treatment of a complicated urinary tract infection causing urinary retention.  
If the patient is interested, he can consider follow-up with Urology, although 
his chronic medical conditions make intervention difficult.  The patient was 
treated for the concern of pulmonary edema on his chest x-ray with Lasix IV.  
Now he is currently on this 3 liters baseline oxygen and his vitals are stable.
  He is now being transitioned back over the home Torsemide.  The patient's 
weakness in his right arm is resolved. He does have weakness and pain in his 
right shoulder which [facility] is aware of and with plans for a follow-up.  It 
appears from the record that this was considered follow-up for a possible 
hematology/oncology.  I question whether follow- up with Orthopedics may be 
more valuable.

 

Mr. Culver is medically stable for discharge back to [facility].  He will 
complete a course of antibiotics for UTI and consider follow-up as per above.

 

DISPOSITION:  To [facility].

 

DIET:  Low fat, low salt.

 

ACTIVITY:  As tolerated.

 

FOLLOW-UP:

1.  Please consider follow-up with Urology based on patient's preference for 
urinary retention and BPH.

2.  Please consider follow-up with Orthopedics regarding right shoulder pain.

 

Approximately 60 minutes were spent in the discharge of this patient, more than 
half that time was spent with the patient at the bedside reviewing the events 
leading up to this hospitalization, performing the physical examination, and 
reviewing my plan of care.

 

____________________________________ 

ALEXANDRA CIFUENTES NP

 

[id number]/CPS #: [id number]

NICOLAS

## 2018-08-02 NOTE — PN
Subjective


Date of Service: 08/02/18


Interval History: 





Mr. Culver denies complaint this morning and is eager for discharge back to 

[facility].  He does have some continued pain in his right shoulder but he 

reports this has been a problem for a while and has planned follow up 

outpatient.  He reports that the weakness in his right shoulder has improved.  

He denies chest pain, SOB, nausea, or abdominal pain.








Family History: Unchanged from Admission


Social History: Unchanged from Admission


Past Medical History: Unchanged from Admission





Objective


Active Medications: 





Acetaminophen (Tylenol Tab*)  650 mg PO Q6H PRN


Albuterol (Ventolin 2.5 Mg/3 Ml Neb.Sol*)  2.5 mg INH Q2H PRN


Aspirin (Aspirin Ec Tab*)  81 mg PO DAILY TISHA


Cholecalciferol (Vitamin D Tab*)  2,000 units PO QAM TISHA


Docusate Sodium (Colace Cap*)  100 mg PO QAM TISHA


Finasteride (Proscar Tab*)  5 mg PO DAILY TISHA


Furosemide (Lasix Iv*)  40 mg IV SLOW PU DAILY TISHA


Ipratropium Bromide (Atrovent 0.5 Mg Neb.Sol*)  0.5 mg INH Q6HR PRN


Mometasone Furoate/Formoterol Fumar (Dulera 200/5 Mdi*)  2 puff INH BID TISHA


Ondansetron HCl (Zofran Inj*)  4 mg IV Q6H PRN


Oxycodone HCl (Roxycodone Tab*)  5 mg PO Q6H PRN


Oxycodone HCl (Roxycodone Tab*)  5 mg PO QAM [facility]


Senna (Senokot Tab*)  1 tab PO QAM [facility]


Spironolactone (Aldactone Tab*)  12.5 mg PO DAILY TISHA


Tamsulosin HCl (Flomax Cap*)  0.4 mg PO BEDTIME TISHA


Warfarin Sodium (Coumadin Tab(*))  2.5 mg PO 1700 TISHA; Protocol





Vital Signs:











Temp Pulse Resp BP Pulse Ox


 


 98.1 F   70   14   97/52   95 


 


 08/02/18 07:17  08/02/18 09:40  08/02/18 09:40  08/02/18 07:17  08/02/18 09:40











Oxygen Devices in Use Now: Nasal Cannula


Appearance: Male lying in bed in NAD


Eyes: No Scleral Icterus


Ears/Nose/Mouth/Throat: Mucous Membranes Moist


Neck: Trachea Midline


Respiratory: Symmetrical Chest Expansion and Respiratory Effort, Clear to 

Auscultation


Cardiovascular: NL Sounds; No Murmurs; No JVD, No Edema


Abdominal: NL Sounds; No Tenderness; No Distention


Lymphatic: No Cervical Adenopathy


Extremities: No Edema


Skin: No Rash or Ulcers


Neurological: Alert and Oriented x 3, - - Decreased ROM at right shoulder, 

otherwise +4 strength 


Nutrition: Taking PO's


Result Diagrams: 


 07/30/18 06:34





 07/30/18 06:33





Assess/Plan/Problems-Billing


Assessment: 





Mr. Culver is a 67yo M with PMH of super morbid obesity with BMI 59, CKD stage 4

, systolic CHF with EF 45%, Afib, prior CVA with residual right sided 

hemiparesis, KIM, HTN, BPH, urinary retention, chronic hypoxemic respiratory 

failure on home O2, MRSA UTI, VRE bacteremia, and SNF resident who presented to 

ED with c/o worsening of baseline right sided weakness.








- Patient Problems


(1) Hemiparesis of right dominant side as late effect of cerebral infarction


Comment: 


- Improving


- Likely recrudescence of prior CVA in setting of UTI


- Neurology consult, input appreciated


- Continue Aspirin and Warfarin   





(2) Acute on chronic systolic (congestive) heart failure


Comment: 


- Currently on 3 L NC, at baseline.


- Echo was a limited study due to body habitus, unable to estimate EF


- Last EF in 2017 was 45%


- Switch back to home torsemide.   





(3) Acute urinary retention


Comment: 


- Plan to discharge with a alexandre 


- Continue flomax


- Will need urology follow up at discharge   





(4) Urinary tract infection with hematuria


Comment: 


- Patient on treatment outpatient for UTI which may skew results of culture.  

He had rosalba urinary retention in ED, with copious foul urine.


- Despite, urine culture showing no growth will continue antibiotic treatment 

with 3rd generation cephalosporin based on review of previous urine micro 

results.   





(5) CKD (chronic kidney disease) stage 4, GFR 15-29 ml/min


Comment: 


- At baseline   





(6) KIM (obstructive sleep apnea)


Comment: 


- With chronic hypoxic/hypercarbic respiratory failure, on 3L O2 routinely.


- With obesity hypoventilation syndrome.


- Continue CPAP   





(7) Hypertension


Comment: 


- BP .


- Continue spironolactone and torsemide.   





(8) Afib


Comment: 


- Pacemaker for tachy-olinda syndrome.


- Hx of cardioembolix stroke, 2016.


- Continue coumdin.   





(9) DVT prophylaxis


Comment: 


- Warfarin, INR 1.98   





(10) Full code status


Comment: 


   


Status and Disposition: 





Discharge to [facility].

## 2018-10-02 ENCOUNTER — HOSPITAL ENCOUNTER (INPATIENT)
Dept: HOSPITAL 25 - ED | Age: 68
LOS: 15 days | Discharge: NURSING, REHAB OR LONG-TERM CARE | DRG: 987 | End: 2018-10-17
Attending: HOSPITALIST | Admitting: HOSPITALIST
Payer: MEDICARE

## 2018-10-02 DIAGNOSIS — R40.2344: ICD-10-CM

## 2018-10-02 DIAGNOSIS — I13.0: ICD-10-CM

## 2018-10-02 DIAGNOSIS — Z23: ICD-10-CM

## 2018-10-02 DIAGNOSIS — R23.8: ICD-10-CM

## 2018-10-02 DIAGNOSIS — R65.21: ICD-10-CM

## 2018-10-02 DIAGNOSIS — Z79.82: ICD-10-CM

## 2018-10-02 DIAGNOSIS — N99.820: ICD-10-CM

## 2018-10-02 DIAGNOSIS — J96.21: ICD-10-CM

## 2018-10-02 DIAGNOSIS — J44.0: ICD-10-CM

## 2018-10-02 DIAGNOSIS — R31.9: ICD-10-CM

## 2018-10-02 DIAGNOSIS — N18.4: ICD-10-CM

## 2018-10-02 DIAGNOSIS — K59.00: ICD-10-CM

## 2018-10-02 DIAGNOSIS — Z99.81: ICD-10-CM

## 2018-10-02 DIAGNOSIS — A41.9: ICD-10-CM

## 2018-10-02 DIAGNOSIS — H91.90: ICD-10-CM

## 2018-10-02 DIAGNOSIS — E87.0: ICD-10-CM

## 2018-10-02 DIAGNOSIS — I48.2: ICD-10-CM

## 2018-10-02 DIAGNOSIS — N17.9: ICD-10-CM

## 2018-10-02 DIAGNOSIS — I69.222: ICD-10-CM

## 2018-10-02 DIAGNOSIS — E66.2: ICD-10-CM

## 2018-10-02 DIAGNOSIS — T83.511A: Primary | ICD-10-CM

## 2018-10-02 DIAGNOSIS — Z80.1: ICD-10-CM

## 2018-10-02 DIAGNOSIS — Z87.440: ICD-10-CM

## 2018-10-02 DIAGNOSIS — Z87.891: ICD-10-CM

## 2018-10-02 DIAGNOSIS — Z79.01: ICD-10-CM

## 2018-10-02 DIAGNOSIS — R68.0: ICD-10-CM

## 2018-10-02 DIAGNOSIS — J18.9: ICD-10-CM

## 2018-10-02 DIAGNOSIS — Z74.01: ICD-10-CM

## 2018-10-02 DIAGNOSIS — I50.22: ICD-10-CM

## 2018-10-02 DIAGNOSIS — Z82.3: ICD-10-CM

## 2018-10-02 DIAGNOSIS — I69.951: ICD-10-CM

## 2018-10-02 DIAGNOSIS — R20.2: ICD-10-CM

## 2018-10-02 DIAGNOSIS — I63.9: ICD-10-CM

## 2018-10-02 DIAGNOSIS — J96.22: ICD-10-CM

## 2018-10-02 DIAGNOSIS — B96.4: ICD-10-CM
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
## 2018-10-02 DIAGNOSIS — Z95.0: ICD-10-CM

## 2018-10-02 DIAGNOSIS — M17.10: ICD-10-CM

## 2018-10-02 DIAGNOSIS — B95.2: ICD-10-CM

## 2018-10-02 DIAGNOSIS — R33.8: ICD-10-CM

## 2018-10-02 DIAGNOSIS — Z86.14: ICD-10-CM
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
## 2018-10-02 DIAGNOSIS — N40.1: ICD-10-CM

## 2018-10-02 DIAGNOSIS — R40.2214: ICD-10-CM

## 2018-10-02 DIAGNOSIS — N39.0: ICD-10-CM

## 2018-10-02 DIAGNOSIS — Z80.0: ICD-10-CM

## 2018-10-02 DIAGNOSIS — R40.2114: ICD-10-CM

## 2018-10-02 LAB
BASOPHILS # BLD AUTO: 0 10^3/UL (ref 0–0.2)
EOSINOPHIL # BLD AUTO: 0.1 10^3/UL (ref 0–0.6)
HCT VFR BLD AUTO: 39 % (ref 42–52)
HGB BLD-MCNC: 11.8 G/DL (ref 14–18)
LYMPHOCYTES # BLD AUTO: 0.4 10^3/UL (ref 1–4.8)
MCH RBC QN AUTO: 25 PG (ref 27–31)
MCHC RBC AUTO-ENTMCNC: 31 G/DL (ref 31–36)
MCV RBC AUTO: 82 FL (ref 80–94)
MONOCYTES # BLD AUTO: 0.5 10^3/UL (ref 0–0.8)
NEUTROPHILS # BLD AUTO: 11.3 10^3/UL (ref 1.5–7.7)
NRBC # BLD AUTO: 0 10^3/UL
NRBC BLD QL AUTO: 0.2
PLATELET # BLD AUTO: 242 10^3/UL (ref 150–450)
RBC # BLD AUTO: 4.7 10^6/UL (ref 4–5.4)
WBC # BLD AUTO: 12.3 10^3/UL (ref 3.5–10.8)

## 2018-10-02 PROCEDURE — 87186 SC STD MICRODIL/AGAR DIL: CPT

## 2018-10-02 PROCEDURE — 70450 CT HEAD/BRAIN W/O DYE: CPT

## 2018-10-02 PROCEDURE — 85610 PROTHROMBIN TIME: CPT

## 2018-10-02 PROCEDURE — 87205 SMEAR GRAM STAIN: CPT

## 2018-10-02 PROCEDURE — 82607 VITAMIN B-12: CPT

## 2018-10-02 PROCEDURE — 84100 ASSAY OF PHOSPHORUS: CPT

## 2018-10-02 PROCEDURE — 94640 AIRWAY INHALATION TREATMENT: CPT

## 2018-10-02 PROCEDURE — 81015 MICROSCOPIC EXAM OF URINE: CPT

## 2018-10-02 PROCEDURE — 83605 ASSAY OF LACTIC ACID: CPT

## 2018-10-02 PROCEDURE — 80061 LIPID PANEL: CPT

## 2018-10-02 PROCEDURE — 85027 COMPLETE CBC AUTOMATED: CPT

## 2018-10-02 PROCEDURE — 85025 COMPLETE CBC W/AUTO DIFF WBC: CPT

## 2018-10-02 PROCEDURE — 83880 ASSAY OF NATRIURETIC PEPTIDE: CPT

## 2018-10-02 PROCEDURE — 83735 ASSAY OF MAGNESIUM: CPT

## 2018-10-02 PROCEDURE — 87641 MR-STAPH DNA AMP PROBE: CPT

## 2018-10-02 PROCEDURE — 82803 BLOOD GASES ANY COMBINATION: CPT

## 2018-10-02 PROCEDURE — 84484 ASSAY OF TROPONIN QUANT: CPT

## 2018-10-02 PROCEDURE — 94660 CPAP INITIATION&MGMT: CPT

## 2018-10-02 PROCEDURE — 80048 BASIC METABOLIC PNL TOTAL CA: CPT

## 2018-10-02 PROCEDURE — 93306 TTE W/DOPPLER COMPLETE: CPT

## 2018-10-02 PROCEDURE — 87086 URINE CULTURE/COLONY COUNT: CPT

## 2018-10-02 PROCEDURE — 36415 COLL VENOUS BLD VENIPUNCTURE: CPT

## 2018-10-02 PROCEDURE — 87040 BLOOD CULTURE FOR BACTERIA: CPT

## 2018-10-02 PROCEDURE — 87070 CULTURE OTHR SPECIMN AEROBIC: CPT

## 2018-10-02 PROCEDURE — 81003 URINALYSIS AUTO W/O SCOPE: CPT

## 2018-10-02 PROCEDURE — 87077 CULTURE AEROBIC IDENTIFY: CPT

## 2018-10-02 PROCEDURE — 80202 ASSAY OF VANCOMYCIN: CPT

## 2018-10-02 PROCEDURE — 99285 EMERGENCY DEPT VISIT HI MDM: CPT

## 2018-10-02 PROCEDURE — 80053 COMPREHEN METABOLIC PANEL: CPT

## 2018-10-02 PROCEDURE — 93880 EXTRACRANIAL BILAT STUDY: CPT

## 2018-10-02 PROCEDURE — 90686 IIV4 VACC NO PRSV 0.5 ML IM: CPT

## 2018-10-02 PROCEDURE — 31624 DX BRONCHOSCOPE/LAVAGE: CPT

## 2018-10-02 PROCEDURE — 82306 VITAMIN D 25 HYDROXY: CPT

## 2018-10-02 PROCEDURE — 36600 WITHDRAWAL OF ARTERIAL BLOOD: CPT

## 2018-10-02 PROCEDURE — 94003 VENT MGMT INPAT SUBQ DAY: CPT

## 2018-10-02 PROCEDURE — 93005 ELECTROCARDIOGRAM TRACING: CPT

## 2018-10-02 PROCEDURE — 71045 X-RAY EXAM CHEST 1 VIEW: CPT

## 2018-10-02 RX ADMIN — NITROGLYCERIN ONE: 20 OINTMENT TOPICAL at 22:26

## 2018-10-02 RX ADMIN — NITROGLYCERIN ONE INCH: 20 OINTMENT TOPICAL at 21:56

## 2018-10-02 NOTE — ED
Shortness of Breath





- HPI Summary


HPI Summary: 





This patient is a 68 year old M BIBA to [facility] from [facility] for respiratory 

distress and what they call lethargy. Pt is non responsive and only grunts 

intermittently. Level 5 caveat due to AMS. 


Hx CHF 








- History of Current Complaint


Onset/Duration: Still Present


Current Severity: Severe


Dyspnea At: Rest





- Allergy/Home Medications


Allergies/Adverse Reactions: 


 Allergies











Allergy/AdvReac Type Severity Reaction Status Date / Time


 


No Known Allergies Allergy   Verified 03/28/18 13:25














PMH/Surg Hx/FS Hx/Imm Hx


Cardiovascular History: Reports: Hx Congestive Heart Failure, Hx Hypertension, 

Hx Pacemaker/ICD


Respiratory History: Reports: Hx Pneumonia, Hx Pulmonary Edema, Hx Sleep Apnea


   Denies: Hx Asthma, Hx Chronic Obstructive Pulmonary Disease (COPD)


 History: Reports: Hx Benign Prostatic Hyperplasia, Hx Chronic Renal Failure, 

Hx Kidney Infection, Other  Problems/Disorders - history MRSA in urine


Musculoskeletal History: Reports: Hx Arthritis - knee, Other Musculoskeletal 

History - morbid obesity


Sensory History: Reports: Hx Hearing Problem - mild [place]


   Denies: Hx Contacts or Glasses, Hx Hearing Aid


Opthamlomology History: 


   Denies: Hx Contacts or Glasses


Neurological History: Reports: Hx CVA


Psychiatric History: 


   Denies: Hx Panic Disorder





- Surgical History


Surgery Procedure, Year, and Place: HERNIA REPAIR, AICD/PACER


Hx Anesthesia Reactions: No





- Immunization History


Date of Tetanus Vaccine: up to date


Date of Influenza Vaccine: 2016


Infectious Disease History: Reports: Hx of Known/Suspected MRSA - mrsa neg as 

of f4/30/17


   Denies: Hx Clostridium Difficile





- Family History


Known Family History: Positive: Other - Stroke


Family History: FHx of CA.  No FHx of CVA





- Social History


Alcohol Use: None


Hx Substance Use: No


Substance Use Type: Reports: None


Hx Tobacco Use: No


Smoking Status (MU): Former Smoker


Type: Cigarettes


Amount Used/How Often: 1 pack per week


Length of Time of Smoking/Using Tobacco: 1 year


Have You Smoked in the Last Year: No





Review of Systems





- ROS Summary


Review of Systems Summary: 





Level 5 caveat due to AMS. 


Positive: Other - "lethargic" 


Positive: Shortness Of Breath


Positive: Other - AMS 


All Other Systems Reviewed And Are Negative: No





- Comments


Additional Review of Systems Comments: 





Level 5 caveat due to AMS. 





Physical Exam





- Summary


Physical Exam Summary: 





Appearance: obese lethargic male who is in moderate respiratory distress 


Skin: Warm, dry, no obvious rash


Eyes: sclera anicteric, no conjunctival pallor


ENT: mucous membranes moist, pharynx appears normal


Neck: Supple, nontender


Respiratory: unable to auscultate due to the patients habitus 


Cardiovascular: Normal


Abdomen: Soft, obese 


Musculoskeletal: Normal, 


Neurological: AMS, patient does not follow commands and only grunts to questions





Triage Information Reviewed: Yes


Vital Signs Reviewed: Yes


Completion Of Physical Exam Limited Due To: Level 5





Diagnostics





- Laboratory


Result Diagrams: 


 10/04/18 04:25





 10/04/18 04:25


Lab Statement: Any lab studies that have been ordered have been reviewed, and 

results considered in the medical decision making process.





Re-Evaluation





- Re-Evaluation


  ** First Eval


Re-Evaluation Time: 21:21


Change: Unchanged


Comment: Patients family arrived in the ED and states the pt has a hx UTI that 

has caused similar sx in the past. He states thats why he recently had a 

catheter placed.





Course/Dx





- Course


Assessment/Plan: This patient is a 68 year old M BIBA to [facility] from [facility] 

for respiratory distress and what they call lethargy. Pt is non responsive and 

only grunts intermittently. Level 5 caveat due to AMS.  Hx CHF.  Patients 

family arrived in the ED and states the pt has a hx UTI that has caused similar 

sx in the past. He states thats why he recently had a catheter placed.  The 

patient will be signed out to Dr. Flowers awaiting CXR and admission





- Diagnoses


Provider Diagnoses: 


 Acute respiratory failure, PNA (pneumonia), UTI (urinary tract infection)








- Physician Notifications


Discussed Care of Patient With: Sangeeta Breen


Time Discussed With Above Provider: 21:53


Instructed by Provider To: Other - I discussed patient care with Dr. Breen and  

I informed her of the patients case.





Discharge





- Sign-Out/Discharge


Documenting (check all that apply): Sign-Out Patient


Signing out patient TO: Puja Flowers


Receiving patient FROM: Arvind Oconnell





- Discharge Plan


Condition: Guarded


Disposition: ADMITTED TO [facility]





- Billing Disposition and Condition


Condition: GUARDED


Disposition: Admitted to [facility]





- Attestation Statements


Document Initiated by Scribe: Yes


Documenting Scribe: Vicente Lucero 


Provider For Whom Scribe is Documenting (Include Credential): Arvind Oconnell MD 


Scribe Attestation: 


I, Vicente Lucero , scribed for Arvind Oconnell MD  on 10/04/18 at 1436. 


Scribe Documentation Reviewed: Yes


Provider Attestation: 


The documentation as recorded by the сергейibeVicente  accurately reflects 

the service I personally performed and the decisions made by me, Arvind Oconnell MD

## 2018-10-02 NOTE — ED
Progress





- Results/Orders


Results/Orders: 


CXR post-intubation: Poor quality. ET tube 4 cm above jamie, OG tube in 

stomach. Left lung is opaque and there is diffuse infiltrate in the right.





Re-Evaluation





- Re-Evaluation


  ** First Eval


Re-Evaluation Time: 21:21


Change: Unchanged


Comment: Patients family arrived in the ED and states the pt has a hx UTI that 

has caused similar sx in the past. He states thats why he recently had a 

catheter placed.





Course/Dx





- Course


Course Of Treatment: Pt intubated.  ABG pH 7.18.  ABG pCO2 104.  A CXR s/p 

intubation was of poor quality, and revealed ET 4 cm above jamie, OG tube in 

stomach. Left lung is opaque and there is diffuse infiltrate in the right.  Pt 

moved to room 14 due to extreme sx and diagnostics.





- Diagnoses


Provider Diagnoses: 


 Acute respiratory failure, PNA (pneumonia), UTI (urinary tract infection)








- Provider Notifications


Discussed Care Of Patient With: Sangeeta Breen


Time Discussed With Above Provider: 21:53


Instructed by Provider To: Other - I discussed patient care with Dr. Breen and  

I informed her of the patients case. She accepts admission.





- Critical Care Time


Critical Care Time: 30-74 min - 50





Discharge





- Sign-Out/Discharge


Documenting (check all that apply): Patient Departure - admit, Receiving Sign-

Out


Receiving patient FROM: Arvind Oconnell





- Discharge Plan


Condition: Guarded


Disposition: ADMITTED TO [facility]


Referrals: 


Maghaydah,Qutaybeh, MD [Primary Care Provider] - 





- Attestation Statements


Document Initiated by Scribe: Yes


Documenting Scribe: Dharmesh Madison


Provider For Whom Scribe is Documenting (Include Credential): Dr. Puja Flowers MD


Scribe Attestation: 


Dharmesh FONTAINE, scribed for Dr. Puja Flowers MD on 10/02/18 at 2614. 





Procedures





- Intubation


Time of Intubation: 22:45


Tube Size (cm): 7.5


Post Intubation Xray: Yes

## 2018-10-03 LAB
BASOPHILS # BLD AUTO: 0.1 10^3/UL (ref 0–0.2)
EOSINOPHIL # BLD AUTO: 0.1 10^3/UL (ref 0–0.6)
HCT VFR BLD AUTO: 32 % (ref 42–52)
HGB BLD-MCNC: 10 G/DL (ref 14–18)
INR PPP/BLD: 1.72 (ref 0.77–1.02)
INR PPP/BLD: 1.74 (ref 0.77–1.02)
LYMPHOCYTES # BLD AUTO: 0.6 10^3/UL (ref 1–4.8)
MCH RBC QN AUTO: 26 PG (ref 27–31)
MCHC RBC AUTO-ENTMCNC: 32 G/DL (ref 31–36)
MCV RBC AUTO: 81 FL (ref 80–94)
MONOCYTES # BLD AUTO: 0.4 10^3/UL (ref 0–0.8)
NEUTROPHILS # BLD AUTO: 6.5 10^3/UL (ref 1.5–7.7)
NRBC # BLD AUTO: 0 10^3/UL
NRBC BLD QL AUTO: 0.2
PLATELET # BLD AUTO: 202 10^3/UL (ref 150–450)
RBC # BLD AUTO: 3.91 10^6/UL (ref 4–5.4)
RBC UR QL AUTO: (no result)
WBC # BLD AUTO: 7.6 10^3/UL (ref 3.5–10.8)
WBC UR QL AUTO: (no result)

## 2018-10-03 PROCEDURE — 5A1955Z RESPIRATORY VENTILATION, GREATER THAN 96 CONSECUTIVE HOURS: ICD-10-PCS | Performed by: HOSPITALIST

## 2018-10-03 PROCEDURE — 0T2BX0Z CHANGE DRAINAGE DEVICE IN BLADDER, EXTERNAL APPROACH: ICD-10-PCS | Performed by: INTERNAL MEDICINE

## 2018-10-03 PROCEDURE — 05HM33Z INSERTION OF INFUSION DEVICE INTO RIGHT INTERNAL JUGULAR VEIN, PERCUTANEOUS APPROACH: ICD-10-PCS | Performed by: INTERNAL MEDICINE

## 2018-10-03 PROCEDURE — 5A09457 ASSISTANCE WITH RESPIRATORY VENTILATION, 24-96 CONSECUTIVE HOURS, CONTINUOUS POSITIVE AIRWAY PRESSURE: ICD-10-PCS | Performed by: HOSPITALIST

## 2018-10-03 PROCEDURE — B543ZZA ULTRASONOGRAPHY OF RIGHT JUGULAR VEINS, GUIDANCE: ICD-10-PCS | Performed by: INTERNAL MEDICINE

## 2018-10-03 PROCEDURE — 0BH17EZ INSERTION OF ENDOTRACHEAL AIRWAY INTO TRACHEA, VIA NATURAL OR ARTIFICIAL OPENING: ICD-10-PCS | Performed by: HOSPITALIST

## 2018-10-03 PROCEDURE — 0B9H8ZX DRAINAGE OF LUNG LINGULA, VIA NATURAL OR ARTIFICIAL OPENING ENDOSCOPIC, DIAGNOSTIC: ICD-10-PCS | Performed by: INTERNAL MEDICINE

## 2018-10-03 RX ADMIN — PROPOFOL SCH MLS/HR: 10 INJECTION, EMULSION INTRAVENOUS at 09:41

## 2018-10-03 RX ADMIN — PIPERACILLIN AND TAZOBACTAM SCH MLS/HR: 3; .375 INJECTION, POWDER, LYOPHILIZED, FOR SOLUTION INTRAVENOUS; PARENTERAL at 16:15

## 2018-10-03 RX ADMIN — PROPOFOL SCH MLS/HR: 10 INJECTION, EMULSION INTRAVENOUS at 06:36

## 2018-10-03 RX ADMIN — VANCOMYCIN HYDROCHLORIDE SCH MLS/HR: 1 INJECTION, POWDER, LYOPHILIZED, FOR SOLUTION INTRAVENOUS at 20:45

## 2018-10-03 RX ADMIN — CHLORHEXIDINE GLUCONATE 0.12% ORAL RINSE SCH ML: 1.2 LIQUID ORAL at 23:06

## 2018-10-03 RX ADMIN — PROPOFOL SCH MLS/HR: 10 INJECTION, EMULSION INTRAVENOUS at 18:49

## 2018-10-03 RX ADMIN — MOMETASONE FUROATE AND FORMOTEROL FUMARATE DIHYDRATE SCH: 200; 5 AEROSOL RESPIRATORY (INHALATION) at 19:23

## 2018-10-03 RX ADMIN — CHLORHEXIDINE GLUCONATE 0.12% ORAL RINSE SCH: 1.2 LIQUID ORAL at 03:04

## 2018-10-03 RX ADMIN — ASPIRIN SCH MG: 81 TABLET, CHEWABLE ORAL at 09:36

## 2018-10-03 RX ADMIN — CHLORHEXIDINE GLUCONATE 0.12% ORAL RINSE SCH ML: 1.2 LIQUID ORAL at 12:19

## 2018-10-03 RX ADMIN — PROPOFOL SCH MLS/HR: 10 INJECTION, EMULSION INTRAVENOUS at 21:59

## 2018-10-03 RX ADMIN — CHLORHEXIDINE GLUCONATE 0.12% ORAL RINSE SCH ML: 1.2 LIQUID ORAL at 16:16

## 2018-10-03 RX ADMIN — PROPOFOL SCH MLS/HR: 10 INJECTION, EMULSION INTRAVENOUS at 03:37

## 2018-10-03 RX ADMIN — TAMSULOSIN HYDROCHLORIDE SCH MG: 0.4 CAPSULE ORAL at 20:45

## 2018-10-03 RX ADMIN — CHLORHEXIDINE GLUCONATE 0.12% ORAL RINSE SCH ML: 1.2 LIQUID ORAL at 03:04

## 2018-10-03 RX ADMIN — NOREPINEPHRINE BITARTRATE SCH MLS/HR: 1 INJECTION INTRAVENOUS at 23:47

## 2018-10-03 RX ADMIN — CHLORHEXIDINE GLUCONATE 0.12% ORAL RINSE SCH ML: 1.2 LIQUID ORAL at 06:31

## 2018-10-03 RX ADMIN — CHLORHEXIDINE GLUCONATE 0.12% ORAL RINSE SCH ML: 1.2 LIQUID ORAL at 19:01

## 2018-10-03 RX ADMIN — PROPOFOL SCH MLS/HR: 10 INJECTION, EMULSION INTRAVENOUS at 01:00

## 2018-10-03 RX ADMIN — PIPERACILLIN AND TAZOBACTAM SCH MLS/HR: 3; .375 INJECTION, POWDER, LYOPHILIZED, FOR SOLUTION INTRAVENOUS; PARENTERAL at 16:21

## 2018-10-03 RX ADMIN — PROPOFOL SCH MLS/HR: 10 INJECTION, EMULSION INTRAVENOUS at 16:13

## 2018-10-03 RX ADMIN — FINASTERIDE SCH MG: 5 TABLET, FILM COATED ORAL at 09:36

## 2018-10-03 RX ADMIN — MOMETASONE FUROATE AND FORMOTEROL FUMARATE DIHYDRATE SCH: 200; 5 AEROSOL RESPIRATORY (INHALATION) at 07:02

## 2018-10-03 RX ADMIN — VANCOMYCIN HYDROCHLORIDE SCH MLS/HR: 1 INJECTION, POWDER, LYOPHILIZED, FOR SOLUTION INTRAVENOUS at 09:33

## 2018-10-03 RX ADMIN — PROPOFOL SCH MLS/HR: 10 INJECTION, EMULSION INTRAVENOUS at 13:16

## 2018-10-03 RX ADMIN — PIPERACILLIN AND TAZOBACTAM SCH MLS/HR: 3; .375 INJECTION, POWDER, LYOPHILIZED, FOR SOLUTION INTRAVENOUS; PARENTERAL at 23:43

## 2018-10-03 NOTE — RAD
HISTORY: centeral line placement in Right side of neck



COMPARISONS: October 02, 2018 at 11:18 PM 



VIEWS: 1: frontal AP view of the chest at 8:19 AM. The patient is obliqued to the left. 

The study is technically limited.



FINDINGS:

LINES AND TUBES: An endotracheal tube is noted with the tip overlying the trachea between

the clavicles and the amanda. A gastric tube is noted, with the tip in the left upper

quadrant in a prepyloric position.. A left-sided AICD is noted. A right internal jugular

venous catheter is noted with the tip overlying the expected location of the superior vena

cava. 

CARDIOMEDIASTINAL SILHOUETTE: The cardiomediastinal silhouette is obscured. 

PLEURA: There is a probable large left pleural effusion. There is a small left pleural

effusion.

LUNG PARENCHYMA: There is a diffuse reticular pattern with indistinct pulmonary vessels.

There has been interval development of complete opacification of the left hemithorax. 

ABDOMEN: The upper abdomen is clear. There is no subphrenic gas.

BONES AND SOFT TISSUES: No bone or soft tissue abnormalities are noted.



IMPRESSION: 

1.  LIMITED STUDY.

2.  LINES AND TUBES AS ABOVE.

3.  THERE HAS BEEN INTERVAL DEVELOPMENT OF COMPLETE OPACIFICATION OF LEFT HEMITHORAX WHICH

LIKELY REPRESENTS COMBINATION OF LARGE LEFT PLEURAL EFFUSION AND LEFT LUNG ATELECTASIS

VERSUS CONSOLIDATION.

4.  PULMONARY INTERSTITIAL EDEMA.

## 2018-10-03 NOTE — OP
Operative Report - Blank





- Operative Report


Date of Operation: 10/03/18


Note: 





Bronchoscopy Report





Date: 10/3/18


Time: 945AM


Indication: Mucus plug


Consent: obtained from brother





Description: Bronchoscope was introduced via the ETT and advanced into the 

trachea. The amanda was visualized and was sharp. The scope was passed into the 

left mainstem, left upper lobe, linguala, and left lower lobe bronchi. BAL was 

performed in the Lingula. The scope was then passed to the right mainstem 

bronchus, then to the RUL, RML, and RLL. There was a moderate amount of mucus 

through out both lungs. No endobronchial lesions were visualized. The patient 

tolerated the procedure well will out immediate complications.

## 2018-10-03 NOTE — OP
Operative Report - Blank





- Operative Report


Date of Operation: 10/03/18


Note: 





Central Venous Catheter (CVC, Central Line) Placement


Date: 10/3/18


Time: 8:30am


Indication: Hemodynamic monitoring/Intravenous access


Attending: Mitzy CONTRERAS time-out was completed verifying correct patient, procedure, site, positioning

, and special equipment if applicable. The patient was placed in a dependent 

position appropriate for central line placement based on the vein to be 

cannulated. The patients right neck was prepped and draped in sterile fashion. 

1% Lidocaine was used to anesthetize the surrounding skin area. A triple lumen 7

-Ethiopian catheter was introduced into the internal jugular vein using the 

Seldinger technique and under ultrasound guidance. The catheter was threaded 

smoothly over the guide wire and appropriate blood return was obtained. Each 

lumen of the catheter was evacuated of air and flushed with sterile saline. The 

catheter was then sutured in place to the skin and a sterile dressing applied. 

Perfusion to the extremity distal to the point of catheter insertion was 

checked and found to be adequate. \


Estimated Blood Loss: None


The patient tolerated the procedure well and there were no complications.

## 2018-10-03 NOTE — HP
CC:  [facility]

 

HISTORY AND PHYSICAL:

 

DATE OF ADMISSION:  10/02/18

 

TIME OF EVALUATION:  2300.

 

PRIMARY CARE PROVIDER:  [facility].

 

CHIEF COMPLAINT:  Altered mental status, respiratory distress.

 

HISTORY OF PRESENT ILLNESS:  History was obtained by the medical records and 
the staff as patient is intubated and sedated. Per [facility] notes patient has been 
lying in bed for the past 7 days.  Today appeared to be complaining of 
shortness of breath.  He was hypoxic that improved with a nebulizer treatment.  
He was also noted to have gained weight and an extra torsemide was given. This 
evening he was found somnolent and in respiratory distress.  The patient was 
brought to the ER via EMS for respiratory distress and altered mental status.  
He was found to be in hypercapnic respiratory failure and promptly intubated 
and sedated.  The patient recently had an indwelling Vallecillo catheter placed.  He 
is morbidly obese and appears to be bedbound due to CVA with hemiplegia.  In 
the emergency room, the patient had labs, imaging.  He was given a liter of 
fluid, ceftriaxone, vancomycin and referred to the hospitalist service for 
further evaluation.

 

PAST MEDICAL HISTORY:

1.  Chronic atrial fibrillation, on anticoagulation.

2.  Urinary retention now with indwelling Vallecillo.

3.  Morbid obesity with alveolar hypoventilation.

4.  Cardiomegaly.

5.  Chronic systolic congestive heart failure.

6.  Constipation.

7.  Dysarthria following nontraumatic intracranial hemorrhage.

8.  Chronic kidney disease.

9.  Hemiplegia and hemiparesis secondary to CVA.

10.  COPD on continuous oxygen.

11.  History of MRSA.

12.  Hypertension.

13.  BPH with indwelling Vallecillo catheter.

14.  History of VRE bacteremia.

15.  Obstructive sleep apnea, on BiPAP.

 16.  History of pacemaker/AICD placement



MEDICATIONS:

1.  Spironolactone 12.5 mg p.o. daily.

2.  Advair 250/50 one puff b.i.d.

3.  Flomax 0.4 mg daily.

4.  Aspirin 81 mg daily.

5.  Tylenol 325 two tabs at bedtime.

6.  Oxycodone 5 mg as needed.

7.  Torsemide 20 mg daily.  It appears patient was given additional torsemide 
40 mg.

8.  Cholecalciferol 2000 units daily.

9.  Finasteride 5 mg daily.

10.  Senna docusate 2 tabs daily in the a.m.

11.  Warfarin 2.5 mg daily.

12.  Oxycodone 5 mg q. 6 hours as needed.

13.  Albuterol nebulizer as needed.

14.  DuoNeb as needed.

 

ALLERGIES:  No known drug allergies.

 

FAMILY HISTORY:  Mother  from colon cancer.  Father  from lung 
cancer.

 

SOCIAL HISTORY:  The patient resides at [facility].  He appears 
to be bedbound.  His brother, Nicolas Culver, is his healthcare proxy who is here 
in the waiting room, did confirm that he is a full code.  Very remote history 
of smoking. No history of alcohol abuse.  Retired .  He has never been 
.  No children.  Confirmed that he remains full code with trial 
intubation.

 

REVIEW OF SYSTEMS:  Unable to obtain due to patient's intubation and sedation.

 

                               PHYSICAL EXAMINATION

 

GENERAL:  The patient is intubated and sedated.  He is morbidly obese with 
limited exam due to his obesity.

 

VITAL SIGNS:  Temp is 96.3, pulse rate is 71, respiratory rate is 17, oxygen 
saturation is 98% on mechanical ventilation, blood pressure is 78/48.

 

HEENT:  Head, normocephalic.  Pupils are pinpoint and sluggish.  His 
conjunctivae are injected.  Mouth, he has an ET tube in place.

 

NECK:  Supple.

 

RESPIRATORY:  Diminished breath sounds, unable to appreciate any adventitious 
lung sounds.

 

CARDIAC:  Distant heart sounds, tachycardic.

 

ABDOMEN:  Morbidly obese.

 

EXTREMITIES:  Trace, +1 pretibial edema.  Distant pulses.

 

NEUROLOGIC:  The patient is sedated.

 

DERM:  He has various skin tears on his left and right lower extremities.

 

 LABORATORY DATA:  White count 12.3, hemoglobin 11.8, hematocrit 39, platelets 
242. Blood gases:  PH 7.18, pCO2 of 104, O2 is 202.  Sodium 137, potassium 
pending, chloride 97, bicarb 33, BUN 78, creatinine 3.53, glucose 150.  Lactic 
acid 0.5. .  Troponin 0.02.

 

RADIOGRAPHIC DATA:  Chest x-ray is limited visualization of underexposed film 
likely secondary to his morbid obesity.  EKG shows left ventricular paced 
rhythm.

 

ASSESSMENT:  This is a 68-year-old male who is morbidly obese, weighing 490 
pounds with past medical history of obesity hypoventilation syndrome, 
cerebrovascular accident with hemiparesis, chronic kidney disease, chronic 
indwelling Vallecillo who presented from a nursing home with altered mental status 
and respiratory distress.

 

1.  Respiratory distress.  Assessment:  The patient with acute hypercapnic 
respiratory failure requiring intubation.  I suspect this is secondary to his 
sepsis.  It is hard to determine volume overload due to his poor film due to 
his morbid obesity.  He did have an echo in July which was unable to visualize 
very much at all.  Plan:  He will remain intubated and sedated with fentanyl 
drip, p.r.n. Ativan.  We will repeat a blood gas to show correction of his 
acidosis. Continue his inhaler regimen and follow up with the intensivist in 
the morning.

2.  Acute on chronic kidney injury.  Assessment:  I suspect this is secondary 
to his sepsis.  Plan:  I am going to hold his diuretics, renally dose his meds. 
Repeat his labs in the morning.

3.  Sepsis.  Assessment:  The patient with high white count.  He is 
hypothermic.  I suspect he has a urinary tract infection as a source.  Plan:  
We will obtain blood cultures.  Check urine change on his Vallecillo bag.  He has a 
history of MRSA in his urine in the past, but his repeat MRSA cultures have 
been negative.  Plan:  We will start him on cefepime and continue him on the 
vancomycin and followup his cultures.

4.  Chronic medical problems:  We will place an OG and resume his home 
medication with the exception of his diuretics as he needs volume replacement 
in the setting of his sepsis.

5.  FEN:  Keep him n.p.o.  We will continue to fluid resuscitate him.

6.  DVT prophylaxis:  The patient scores high risk.  He is on Coumadin.  We 
will check an INR.

7.  Code status:  Confirmed he is a full code.

 

TIME SPENT:  Greater than 60 minutes spent doing history and physical, more 
than half time spent in direct patient contact and critical care time.  I did 
sign out to Dr. Forrester.  He is notified of the admission.

 

 

 

[id number]/CPS #: [id number]

Len[facility] HospitalBRANDI

## 2018-10-03 NOTE — RAD
HISTORY: sob



COMPARISONS: August 14, 2018 



VIEWS: 2 : frontal AP view of the chest at 9:55 PM. The patient is obliqued to the left.

The study is markedly technically limited. 



FINDINGS:

LINES AND TUBES: A left-sided AICD is noted.  

CARDIOMEDIASTINAL SILHOUETTE: The cardiac silhouette is enlarged. The cardiomediastinal

silhouette is otherwise normal for portable technique.

PLEURA: There is a probable left pleural effusion. 

LUNG PARENCHYMA: There is a diffuse reticular pattern with indistinct pulmonary vessels.

Evaluation of the left lower lobe is limited. 

ABDOMEN: The upper abdomen is clear. There is no subphrenic gas.

BONES AND SOFT TISSUES: No bone or soft tissue abnormalities are noted.



IMPRESSION: 

1.  TECHNICALLY LIMITED STUDY.

2.  CARDIOMEGALY.

3.  PULMONARY INTERSTITIAL EDEMA.

4.  PROBABLE LEFT PLEURAL EFFUSION 



R1

## 2018-10-03 NOTE — PN
Sepsis Event Evaluation


Date of Evaluation: 10/03/18


Current Stage of Sepsis: Septic Shock


Vital Signs - Last 12 Hours: 


 Vital Signs - 12 hr











  Temp Pulse Resp BP Pulse Ox


 


 10/03/18 11:19    16  


 


 10/03/18 11:00  97.5 F  70  16  133/76  97


 


 10/03/18 10:45  97.5 F  70   126/81  96


 


 10/03/18 10:31  97.5 F  74   114/80  94


 


 10/03/18 10:15  97.5 F  70   86/63  96


 


 10/03/18 10:01  97.5 F    80/57 


 


 10/03/18 10:00  97.5 F  70  16   95


 


 10/03/18 09:45  97.5 F  70   92/59  96


 


 10/03/18 09:31  97.5 F  69   87/57  97


 


 10/03/18 09:15  97.5 F  76   102/67  97


 


 10/03/18 09:00  97.5 F  70  16  85/61  96


 


 10/03/18 08:45  97.5 F  63   86/62  94


 


 10/03/18 08:31  97.5 F  73   88/61  94


 


 10/03/18 08:16  97.5 F  69   101/60  98


 


 10/03/18 08:00  97.3 F  69  16  106/57  98


 


 10/03/18 07:46  97.3 F  70   91/61  98


 


 10/03/18 07:31  97.2 F  64   99/59  100


 


 10/03/18 07:15  97.0 F  70   117/81  99


 


 10/03/18 07:00  97.2 F  69  16  99/63  99


 


 10/03/18 06:45  97.2 F  70   101/65  99


 


 10/03/18 06:31  97.0 F  70   105/63  100


 


 10/03/18 06:15  97.0 F  69   102/61  100


 


 10/03/18 06:00  97.0 F  74   96/53  100


 


 10/03/18 05:55    16  


 


 10/03/18 05:46  96.8 F  92   94/70  99


 


 10/03/18 05:31  96.8 F  70   95/62  99


 


 10/03/18 05:15  96.8 F  70   98/68  100


 


 10/03/18 05:00  96.6 F  70  16  90/70  100


 


 10/03/18 04:46  96.6 F  70   101/63  98


 


 10/03/18 04:31  96.6 F  70   98/58  99


 


 10/03/18 04:16  96.6 F  70   98/52  100


 


 10/03/18 04:01  96.6 F  70   87/64  99


 


 10/03/18 04:00  96.6 F  70  16   100


 


 10/03/18 03:46  96.4 F  70   88/61  99


 


 10/03/18 03:31  96.4 F  70   90/65  99


 


 10/03/18 03:16  96.4 F  70   95/68  100


 


 10/03/18 03:01  96.3 F  70   95/56  100


 


 10/03/18 03:00  96.3 F  70  16   99


 


 10/03/18 02:54  96.3 F  70   109/57  99


 


 10/03/18 02:46  96.4 F  71    100


 


 10/03/18 02:31  96.4 F  70   82/65  99


 


 10/03/18 02:16  96.4 F  71   87/62  100


 


 10/03/18 02:05  96.4 F  71   94/57  98


 


 10/03/18 02:01  96.4 F  72   82/61  99


 


 10/03/18 02:00  96.4 F  73  16   100


 


 10/03/18 01:46  96.4 F  71   88/58  100


 


 10/03/18 01:31  96.4 F  74   84/65  99


 


 10/03/18 01:26  96.4 F  72   85/54  100


 


 10/03/18 01:16  96.4 F  70   76/51  100


 


 10/03/18 01:01  96.4 F  70   116/78  100


 


 10/03/18 01:00  96.4 F  70  16   98


 


 10/03/18 00:44  95.9 F  70   139/89  100


 


 10/03/18 00:39  97.2 F  71  16  118/68  100


 


 10/03/18 00:37    16  


 


 10/03/18 00:35  94.6 F  81   200/136  98


 


 10/03/18 00:15    16  


 


 10/03/18 00:09   71   118/68  100


 


 10/03/18 00:00  97.2 F   14  


 


 10/02/18 23:40  97.9 F  72  20  105/59  100











Lactic Acid: 


 











  10/02/18





  22:00


 


Lactic Acid  0.5














- Cardiopulmonary Exam


Capillary Refill: < or = to 5 seconds


Respiratory: Symmetrical Chest Expansion and Respiratory Effort, Clear to 

Auscultation


Cardiovascular: NL Sounds; No Murmurs; No JVD





- Peripheral Pulse Exam


Radial Pulses: Bilateral Normal


Pedal Pulses: Bilateral Normal


Posterior Tibial Pulse: Bilateral Normal


Femoral Pulses: Bilateral Normal


Popliteal Pulses: Bilateral Normal





- Skin Exam


Skin Exam: Pallor





- Laura Coma Scale


Best Eye Response: 1 - None


Best Motor Response: 4 - Withdraws


Best Verbal Response: 1 - Intubated


Coma Scale Total: 6.0





Assess/Plan/Problems-Billing


Assessment:

## 2018-10-03 NOTE — RAD
HISTORY: s/p bronchoscopy eval for pnx



COMPARISONS: October 03, 2018 at 8:05 AM 



VIEWS: 1: frontal AP view of the chest at 10:30 AM . The study is markedly technically

limited.



FINDINGS:

LINES AND TUBES: An endotracheal tube is noted with the tip overlying the trachea between

the clavicles and the amanda. A gastric tube is noted. The tip is not visualized 

secondary to limitations of technique. A right internal jugular venous catheter is noted

with the tip overlying the expected location of the superior vena cava. A left-sided AICD

is noted.

CARDIOMEDIASTINAL SILHOUETTE: The cardiac silhouette is enlarged. The cardiomediastinal

silhouette is otherwise normal for portable technique.

PLEURA: There is moderate left pleural effusion. There is no appreciable pneumothorax. 

LUNG PARENCHYMA: There is a diffuse reticular pattern with indistinct pulmonary vessels.

There is persistent opacification of the left mid and lower lung fields, with improved

aeration of the left lung apex. 

ABDOMEN: The upper abdomen is clear. There is no subphrenic gas.

BONES AND SOFT TISSUES: No bone or soft tissue abnormalities are noted.



IMPRESSION: 

1.  MARKEDLY LIMITED STUDY. WITHIN THE LIMITATIONS OF STUDY:

2.  CARDIOMEGALY.

3.  PULMONARY EDEMA.

4.  IMPROVED AERATION OF THE LEFT LUNG APEX WITH PERSISTENT AIRSPACE DISEASE OF THE LEFT

MID AND LOWER LUNG FIELDS.

5.  NO APPRECIABLE PNEUMOTHORAX.

6.  LINES AND TUBES AS ABOVE.

## 2018-10-03 NOTE — PN
Date of Service: 10/03/18


Critical Care Services: 





68M with htn, chf, afib s/p ppm on coumadin, ckd, hi/ohs, h/o cva, recurrent 

uti, bph, morbid obesity presents with AMS and dyspnea. Intubated for 

hypercapnic respiratory failure. Septic Shock 2/2 uti.  





10/3: TLC placed. Levophed ordered. CXR with complete white of left lung. 

Family called for consent for bronchoscopy.





Vital Signs: 











Temp Pulse Resp BP SpO2 FiO2


 


97.5 F 73 16 88/61 94 65


 


10/03/18 08:31 10/03/18 08:31 10/03/18 08:00 10/03/18 08:31 10/03/18 08:31 10/03

/18 08:25











Physical Exam: 





Gen - intubated and sedated. morbidly obese


Heent - ncat, perrl


neck - +rij tlc


cv - s1/s2, distant heart sounds


lungs - dec bs bilaterally


abd - soft, nt, nd


ext - trace edema


neuro - unresponsive








Fluid Balance (Past 24 Hours): 


I=     O=     Net 


 Intake & Output











 10/01/18 10/02/18 10/03/18 10/04/18





 06:59 06:59 06:59 06:59


 


Intake Total   1622 0


 


Output Total   465 950


 


Balance   1157 -950


 


Weight   267.846 kg 


 


Intake:    


 


  IV Fluids   665 


 


    NS (0.9%)   665 


 


  IVPB   791 


 


    ABX - CEFEPIME   50 


 


    ABX - VANCOMYCIN   499 


 


    NS (0.9%)   242 


 


  Medicated IV   166 


 


    CC - Propofol/Diprivan   166 


 


  Oral    0


 


Output:    


 


  Vallecillo   465 950





 











Labs: 


 Laboratory Results - last 24 hr











  10/02/18 10/02/18 10/02/18





  22:00 22:00 22:00


 


WBC  12.3 H  


 


RBC  4.70  


 


Hgb  11.8 L  


 


Hct  39 L  


 


MCV  82  


 


MCH  25 L  


 


MCHC  31  


 


RDW  17 H  


 


Plt Count  242  


 


MPV  7.7  


 


Neut % (Auto)  92.2 H  


 


Lymph % (Auto)  3.2 L  


 


Mono % (Auto)  3.8  


 


Eos % (Auto)  0.5  


 


Baso % (Auto)  0.3  


 


Absolute Neuts (auto)  11.3 H  


 


Absolute Lymphs (auto)  0.4 L  


 


Absolute Monos (auto)  0.5  


 


Absolute Eos (auto)  0.1  


 


Absolute Basos (auto)  0  


 


Absolute Nucleated RBC  0  


 


Nucleated RBC %  0.2  


 


INR (Anticoag Therapy)   


 


Patient Temperature   


 


ABG pH   


 


ABG pH (Temp Correct)   


 


ABG pCO2   


 


ABG pCO2 (Temp Corrct   


 


ABG pO2   


 


ABG pO2 (Temp Correct   


 


ABG HCO3   


 


ABG O2 Saturation   


 


ABG Base Excess   


 


Respiration Rate   


 


O2 Delivery Device   


 


Ventilator Type   


 


Vent Mode   


 


FiO2   


 


Inspiratory Time   


 


PEEP   


 


Pressure Support   


 


Pressure Control   


 


EPAP   


 


IPAP   


 


BiPAP   


 


Sodium   137 


 


Potassium   4.4 


 


Chloride   97 L 


 


Carbon Dioxide   33 H 


 


Anion Gap   7 


 


BUN   78 H 


 


Creatinine   3.53 H 


 


Est GFR ( Amer)   20.9 


 


Est GFR (Non-Af Amer)   17.3 


 


BUN/Creatinine Ratio   22.1 H 


 


Glucose   150 H 


 


Lactic Acid    0.5


 


Calcium   8.6 


 


Total Bilirubin   0.30 


 


AST   16 


 


ALT   21 


 


Alkaline Phosphatase   134 H 


 


Troponin I   0.02 


 


B-Natriuretic Peptide   


 


Total Protein   6.7 


 


Albumin   3.2 


 


Globulin   3.5 


 


Albumin/Globulin Ratio   0.9 L 


 


Urine Color   


 


Urine Appearance   


 


Urine pH   


 


Ur Specific Gravity   


 


Urine Protein   


 


Urine Ketones   


 


Urine Blood   


 


Urine Nitrate   


 


Urine Bilirubin   


 


Urine Urobilinogen   


 


Ur Leukocyte Esterase   


 


Urine WBC (Auto)   


 


Urine RBC (Auto)   


 


Urine Bacteria   


 


Urine Glucose   














  10/02/18 10/02/18 10/03/18





  22:00 22:18 00:50


 


WBC   


 


RBC   


 


Hgb   


 


Hct   


 


MCV   


 


MCH   


 


MCHC   


 


RDW   


 


Plt Count   


 


MPV   


 


Neut % (Auto)   


 


Lymph % (Auto)   


 


Mono % (Auto)   


 


Eos % (Auto)   


 


Baso % (Auto)   


 


Absolute Neuts (auto)   


 


Absolute Lymphs (auto)   


 


Absolute Monos (auto)   


 


Absolute Eos (auto)   


 


Absolute Basos (auto)   


 


Absolute Nucleated RBC   


 


Nucleated RBC %   


 


INR (Anticoag Therapy)   


 


Patient Temperature    Not Reportable


 


ABG pH   7.18 L*  7.25 L


 


ABG pH (Temp Correct)    Not Reportable


 


ABG pCO2   104 H*  78 H*


 


ABG pCO2 (Temp Corrct    Not Reportable


 


ABG pO2   202 H  208 H


 


ABG pO2 (Temp Correct    Not Reportable


 


ABG HCO3   30.4  28.6


 


ABG O2 Saturation   99.3 H  98.7 H


 


ABG Base Excess   7.1 H  4.8 H


 


Respiration Rate    16


 


O2 Delivery Device    ventilator


 


Ventilator Type    600


 


Vent Mode    cmv


 


FiO2    100


 


Inspiratory Time    1.0


 


PEEP    5


 


Pressure Support    Not Reportable


 


Pressure Control    Not Reportable


 


EPAP    Not Reportable


 


IPAP    Not Reportable


 


BiPAP    Not Reportable


 


Sodium   


 


Potassium   


 


Chloride   


 


Carbon Dioxide   


 


Anion Gap   


 


BUN   


 


Creatinine   


 


Est GFR ( Amer)   


 


Est GFR (Non-Af Amer)   


 


BUN/Creatinine Ratio   


 


Glucose   


 


Lactic Acid   


 


Calcium   


 


Total Bilirubin   


 


AST   


 


ALT   


 


Alkaline Phosphatase   


 


Troponin I   


 


B-Natriuretic Peptide  356 H  


 


Total Protein   


 


Albumin   


 


Globulin   


 


Albumin/Globulin Ratio   


 


Urine Color   


 


Urine Appearance   


 


Urine pH   


 


Ur Specific Gravity   


 


Urine Protein   


 


Urine Ketones   


 


Urine Blood   


 


Urine Nitrate   


 


Urine Bilirubin   


 


Urine Urobilinogen   


 


Ur Leukocyte Esterase   


 


Urine WBC (Auto)   


 


Urine RBC (Auto)   


 


Urine Bacteria   


 


Urine Glucose   














  10/03/18 10/03/18 10/03/18





  01:37 01:50 01:50


 


WBC   


 


RBC   


 


Hgb   


 


Hct   


 


MCV   


 


MCH   


 


MCHC   


 


RDW   


 


Plt Count   


 


MPV   


 


Neut % (Auto)   


 


Lymph % (Auto)   


 


Mono % (Auto)   


 


Eos % (Auto)   


 


Baso % (Auto)   


 


Absolute Neuts (auto)   


 


Absolute Lymphs (auto)   


 


Absolute Monos (auto)   


 


Absolute Eos (auto)   


 


Absolute Basos (auto)   


 


Absolute Nucleated RBC   


 


Nucleated RBC %   


 


INR (Anticoag Therapy)   1.74 H 


 


Patient Temperature   


 


ABG pH   


 


ABG pH (Temp Correct)   


 


ABG pCO2   


 


ABG pCO2 (Temp Corrct   


 


ABG pO2   


 


ABG pO2 (Temp Correct   


 


ABG HCO3   


 


ABG O2 Saturation   


 


ABG Base Excess   


 


Respiration Rate   


 


O2 Delivery Device   


 


Ventilator Type   


 


Vent Mode   


 


FiO2   


 


Inspiratory Time   


 


PEEP   


 


Pressure Support   


 


Pressure Control   


 


EPAP   


 


IPAP   


 


BiPAP   


 


Sodium   


 


Potassium   


 


Chloride   


 


Carbon Dioxide   


 


Anion Gap   


 


BUN   


 


Creatinine   


 


Est GFR ( Amer)   


 


Est GFR (Non-Af Amer)   


 


BUN/Creatinine Ratio   


 


Glucose   


 


Lactic Acid   


 


Calcium   


 


Total Bilirubin   


 


AST   


 


ALT   


 


Alkaline Phosphatase   


 


Troponin I    0.02


 


B-Natriuretic Peptide   


 


Total Protein   


 


Albumin   


 


Globulin   


 


Albumin/Globulin Ratio   


 


Urine Color  Yellow  


 


Urine Appearance  Turbid  


 


Urine pH  8.0  


 


Ur Specific [place]  1.011  


 


Urine Protein  2+(100 mg/dl) A  


 


Urine Ketones  Negative  


 


Urine Blood  2+ A  


 


Urine Nitrate  Negative  


 


Urine Bilirubin  Negative  


 


Urine Urobilinogen  Negative  


 


Ur Leukocyte Esterase  2+ A  


 


Urine WBC (Auto)  3+(>20/hpf) A  


 


Urine RBC (Auto)  2+(6-10/hpf) A  


 


Urine Bacteria  Absent  


 


Urine Glucose  Negative  














  10/03/18 10/03/18 10/03/18





  05:55 05:55 05:55


 


WBC  7.6  


 


RBC  3.91 L  


 


Hgb  10.0 L  


 


Hct  32 L  


 


MCV  81  


 


MCH  26 L  


 


MCHC  32  


 


RDW  17 H  


 


Plt Count  202  


 


MPV  7.8  


 


Neut % (Auto)  84.9 H  


 


Lymph % (Auto)  8.2 L  


 


Mono % (Auto)  4.9  


 


Eos % (Auto)  1.1  


 


Baso % (Auto)  0.9  


 


Absolute Neuts (auto)  6.5  


 


Absolute Lymphs (auto)  0.6 L  


 


Absolute Monos (auto)  0.4  


 


Absolute Eos (auto)  0.1  


 


Absolute Basos (auto)  0.1  


 


Absolute Nucleated RBC  0  


 


Nucleated RBC %  0.2  


 


INR (Anticoag Therapy)    1.72 H


 


Patient Temperature   


 


ABG pH   


 


ABG pH (Temp Correct)   


 


ABG pCO2   


 


ABG pCO2 (Temp Corrct   


 


ABG pO2   


 


ABG pO2 (Temp Correct   


 


ABG HCO3   


 


ABG O2 Saturation   


 


ABG Base Excess   


 


Respiration Rate   


 


O2 Delivery Device   


 


Ventilator Type   


 


Vent Mode   


 


FiO2   


 


Inspiratory Time   


 


PEEP   


 


Pressure Support   


 


Pressure Control   


 


EPAP   


 


IPAP   


 


BiPAP   


 


Sodium   140 


 


Potassium   4.2 


 


Chloride   102 


 


Carbon Dioxide   27 


 


Anion Gap   11 


 


BUN   78 H 


 


Creatinine   3.29 H 


 


Est GFR ( Amer)   22.7 


 


Est GFR (Non-Af Amer)   18.7 


 


BUN/Creatinine Ratio   23.7 H 


 


Glucose   82 


 


Lactic Acid   


 


Calcium   7.9 L 


 


Total Bilirubin   0.30 


 


AST   15 


 


ALT   16 


 


Alkaline Phosphatase   108 H 


 


Troponin I   


 


B-Natriuretic Peptide   


 


Total Protein   5.6 L 


 


Albumin   2.7 L 


 


Globulin   2.9 


 


Albumin/Globulin Ratio   0.9 L 


 


Urine Color   


 


Urine Appearance   


 


Urine pH   


 


Ur Specific Gravity   


 


Urine Protein   


 


Urine Ketones   


 


Urine Blood   


 


Urine Nitrate   


 


Urine Bilirubin   


 


Urine Urobilinogen   


 


Ur Leukocyte Esterase   


 


Urine WBC (Auto)   


 


Urine RBC (Auto)   


 


Urine Bacteria   


 


Urine Glucose   











Studies: 





CXR 10/3


1. LIMITED STUDY.


2. LINES AND TUBES AS ABOVE.


3. THERE HAS BEEN INTERVAL DEVELOPMENT OF COMPLETE OPACIFICATION OF LEFT 

HEMITHORAX WHICH


LIKELY REPRESENTS COMBINATION OF LARGE LEFT PLEURAL EFFUSION AND LEFT LUNG 

ATELECTASIS


VERSUS CONSOLIDATION.


4. PULMONARY INTERSTITIAL EDEMA.





Impression: 





68M with htn, chf, afib s/p ppm on coumadin, ckd, hi/ohs, h/o cva, recurrent 

uti, bph, morbid obesity presents with AMS and dyspnea. Intubated for 

hypercapnic respiratory failure. Septic Shock 2/2 uti.  





Plan: 





Neuro - AMS


- 2/2 hypercapnea and sepsis





CV - htn, afib, chf, shock


- shock 2/2 sepsis


- levophed for bp support


- hold antihypertensives


- prior tte with poor windows


- monitor on tele


- c/w coumadin for ac





Pulm - hypercapnic respiratory failure, mucus plug


- 2/2 hi/ohs


- plan for bronchoscopy today


- wean vent as tolerated


- allow for higher PEEP given body habitus





ID - septic shock 


- 2/2 uti vs pna


- empiric vanc/zosyn


- f/u cultures


- serial lactates





GI - tube feeds





Renal - ckd


- monitor i/o


- monitor lytes





Heme - monitor cbc





Endo - check fs, niss





Lines - RIJ TLC (10/3/18)





PPx - gi/dvt





Full Code





Critical Care Time: 90 mins

## 2018-10-03 NOTE — RAD
HISTORY: post intubation



COMPARISONS: October 02, 2018 at 9:48 PM 



VIEWS: 2 : frontal AP view of the chest at 11:43 PM. The patient is obliqued to the left.

The study is markedly technically limited. 



FINDINGS:

LINES AND TUBES: An endotracheal tube is noted with the tip overlying the trachea at the

level of the clavicles.. A gastric tube is noted. The tip is is not well visualized of the

current examination secondary to technique, but is below the diaphragm. An AICD is noted.

CARDIOMEDIASTINAL SILHOUETTE: The cardiac silhouette is enlarged. The cardiomediastinal

silhouette is otherwise normal for portable technique.

PLEURA: There is a probable left pleural effusion. There is a small right pleural

effusion. 

LUNG PARENCHYMA: There is a diffuse reticular pattern with indistinct pulmonary vessels.

ABDOMEN: The upper abdomen is clear. There is no subphrenic gas.

BONES AND SOFT TISSUES: No bone or soft tissue abnormalities are noted.



IMPRESSION: 

1.  LIMITED STUDY.

2.  LINES AND TUBES AS ABOVE.

3.  CARDIOMEGALY

4.  PULMONARY INTERSTITIAL EDEMA.

5.  PROBABLE LEFT PLEURAL EFFUSION WITH SMALL RIGHT PLEURAL EFFUSION. 



R1

## 2018-10-04 LAB
HCT VFR BLD AUTO: 35 % (ref 42–52)
HGB BLD-MCNC: 10.9 G/DL (ref 14–18)
INR PPP/BLD: 2.93 (ref 0.77–1.02)
MCH RBC QN AUTO: 25 PG (ref 27–31)
MCHC RBC AUTO-ENTMCNC: 32 G/DL (ref 31–36)
MCV RBC AUTO: 80 FL (ref 80–94)
PLATELET # BLD AUTO: 237 10^3/UL (ref 150–450)
RBC # BLD AUTO: 4.36 10^6/UL (ref 4–5.4)
WBC # BLD AUTO: 10.8 10^3/UL (ref 3.5–10.8)

## 2018-10-04 PROCEDURE — 3E033XZ INTRODUCTION OF VASOPRESSOR INTO PERIPHERAL VEIN, PERCUTANEOUS APPROACH: ICD-10-PCS | Performed by: INTERNAL MEDICINE

## 2018-10-04 RX ADMIN — PROPOFOL SCH MLS/HR: 10 INJECTION, EMULSION INTRAVENOUS at 15:17

## 2018-10-04 RX ADMIN — PROPOFOL SCH MLS/HR: 10 INJECTION, EMULSION INTRAVENOUS at 10:20

## 2018-10-04 RX ADMIN — NOREPINEPHRINE BITARTRATE SCH MLS/HR: 1 INJECTION INTRAVENOUS at 23:42

## 2018-10-04 RX ADMIN — PROPOFOL SCH MLS/HR: 10 INJECTION, EMULSION INTRAVENOUS at 12:46

## 2018-10-04 RX ADMIN — TAMSULOSIN HYDROCHLORIDE SCH MG: 0.4 CAPSULE ORAL at 20:29

## 2018-10-04 RX ADMIN — PROPOFOL SCH MLS/HR: 10 INJECTION, EMULSION INTRAVENOUS at 01:10

## 2018-10-04 RX ADMIN — PIPERACILLIN AND TAZOBACTAM SCH MLS/HR: 3; .375 INJECTION, POWDER, LYOPHILIZED, FOR SOLUTION INTRAVENOUS; PARENTERAL at 15:47

## 2018-10-04 RX ADMIN — PIPERACILLIN AND TAZOBACTAM SCH MLS/HR: 3; .375 INJECTION, POWDER, LYOPHILIZED, FOR SOLUTION INTRAVENOUS; PARENTERAL at 23:41

## 2018-10-04 RX ADMIN — ASPIRIN SCH MG: 81 TABLET, CHEWABLE ORAL at 09:26

## 2018-10-04 RX ADMIN — CHLORHEXIDINE GLUCONATE 0.12% ORAL RINSE SCH ML: 1.2 LIQUID ORAL at 07:36

## 2018-10-04 RX ADMIN — PIPERACILLIN AND TAZOBACTAM SCH MLS/HR: 3; .375 INJECTION, POWDER, LYOPHILIZED, FOR SOLUTION INTRAVENOUS; PARENTERAL at 07:36

## 2018-10-04 RX ADMIN — CHLORHEXIDINE GLUCONATE 0.12% ORAL RINSE SCH ML: 1.2 LIQUID ORAL at 15:47

## 2018-10-04 RX ADMIN — PROPOFOL SCH MLS/HR: 10 INJECTION, EMULSION INTRAVENOUS at 07:14

## 2018-10-04 RX ADMIN — MOMETASONE FUROATE AND FORMOTEROL FUMARATE DIHYDRATE SCH: 200; 5 AEROSOL RESPIRATORY (INHALATION) at 10:41

## 2018-10-04 RX ADMIN — PIPERACILLIN AND TAZOBACTAM SCH: 3; .375 INJECTION, POWDER, LYOPHILIZED, FOR SOLUTION INTRAVENOUS; PARENTERAL at 06:10

## 2018-10-04 RX ADMIN — CHLORHEXIDINE GLUCONATE 0.12% ORAL RINSE SCH ML: 1.2 LIQUID ORAL at 19:15

## 2018-10-04 RX ADMIN — MOMETASONE FUROATE AND FORMOTEROL FUMARATE DIHYDRATE SCH: 200; 5 AEROSOL RESPIRATORY (INHALATION) at 19:53

## 2018-10-04 RX ADMIN — PROPOFOL SCH MLS/HR: 10 INJECTION, EMULSION INTRAVENOUS at 17:32

## 2018-10-04 RX ADMIN — NOREPINEPHRINE BITARTRATE SCH MLS/HR: 1 INJECTION INTRAVENOUS at 11:16

## 2018-10-04 RX ADMIN — VANCOMYCIN HYDROCHLORIDE SCH: 1 INJECTION, POWDER, LYOPHILIZED, FOR SOLUTION INTRAVENOUS at 10:41

## 2018-10-04 RX ADMIN — FINASTERIDE SCH MG: 5 TABLET, FILM COATED ORAL at 09:26

## 2018-10-04 RX ADMIN — CHLORHEXIDINE GLUCONATE 0.12% ORAL RINSE SCH ML: 1.2 LIQUID ORAL at 23:41

## 2018-10-04 RX ADMIN — PROPOFOL SCH MLS/HR: 10 INJECTION, EMULSION INTRAVENOUS at 19:50

## 2018-10-04 RX ADMIN — CHLORHEXIDINE GLUCONATE 0.12% ORAL RINSE SCH ML: 1.2 LIQUID ORAL at 03:08

## 2018-10-04 RX ADMIN — PROPOFOL SCH MLS/HR: 10 INJECTION, EMULSION INTRAVENOUS at 04:19

## 2018-10-04 RX ADMIN — PROPOFOL SCH MLS/HR: 10 INJECTION, EMULSION INTRAVENOUS at 22:18

## 2018-10-04 RX ADMIN — PROPOFOL SCH MLS/HR: 10 INJECTION, EMULSION INTRAVENOUS at 11:16

## 2018-10-04 RX ADMIN — CHLORHEXIDINE GLUCONATE 0.12% ORAL RINSE SCH ML: 1.2 LIQUID ORAL at 10:39

## 2018-10-04 NOTE — RAD
HISTORY: respiratory failure



COMPARISONS: October 03, 2018 



VIEWS: 2 : frontal AP view of the chest at 12:30 PM. The patient is obliqued to the left.

This study is technically limited. 



FINDINGS:

LINES AND TUBES: An endotracheal tube is noted with the tip overlying the trachea between

the clavicles and the amanda. A gastric tube is noted. The tip is is not well visualized

secondary to technique. A right internal jugular venous catheter is noted over the

expected location of the superior vena cava. An AICD is noted..

CARDIOMEDIASTINAL SILHOUETTE: The cardiac silhouette is enlarged. The cardiomediastinal

silhouette is otherwise normal for portable technique.

PLEURA: There are small bilateral pleural effusions. 

LUNG PARENCHYMA: There is a diffuse reticular pattern with indistinct pulmonary vessels.

There is confluent alveolar opacification of the retrocardiac left lower lung, with

improved aeration compared to the previous examination. 

ABDOMEN: The upper abdomen is clear. There is no subphrenic gas.

BONES AND SOFT TISSUES: No bone or soft tissue abnormalities are noted.



IMPRESSION: 

1.  LIMITED STUDY.

2.  LINES AND TUBES AS ABOVE.

3.  CARDIOMEGALY.

4.  PULMONARY INTERSTITIAL EDEMA.

5.  BILATERAL PLEURAL EFFUSIONS.

6.  LEFT LOWER LUNG CONSOLIDATION, WITH IMPROVED AERATION COMPARED TO October 03, 2018

## 2018-10-04 NOTE — PN
Date of Service: 10/04/18


Critical Care Services: 





68M with htn, chf, afib s/p ppm on coumadin, ckd, hi/ohs, h/o cva, recurrent 

uti, bph, morbid obesity presents with AMS and dyspnea. Intubated for 

hypercapnic respiratory failure. Septic Shock 2/2 uti.  





10/3: TLC placed. Levophed ordered. CXR with complete white of left lung. 

Family called for consent for bronchoscopy.


10/4: s/p bronch yesterday with moderate amount of secretions. vent weaned to 40

% fio2. Failed SBT this am. UCx with proteus and e faecalis.





Vital Signs: 











Temp Pulse Resp BP SpO2 FiO2


 


98.2 F 70 18 106/51 98 55


 


10/04/18 08:15 10/04/18 08:15 10/04/18 08:00 10/04/18 08:15 10/04/18 08:15 10/04

/18 08:00











Physical Exam: 





Gen - intubated and sedated. morbidly obese


Heent - ncat, perrl


neck - +rij tlc


cv - s1/s2, distant heart sounds


lungs - dec bs bilaterally


abd - soft, nt, nd


ext - trace edema


neuro - unresponsive





Fluid Balance (Past 24 Hours): 


I=     O=     Net 


 Intake & Output











 10/02/18 10/03/18 10/04/18 10/05/18





 06:59 06:59 06:59 06:59


 


Intake Total  1622 2373 


 


Output Total  665 4165 255


 


Balance  957 -1792 -255


 


Weight  267.846 kg 264.898 kg 


 


Intake:    


 


  IV Fluids  665 909 


 


    ABX - VANCOMYCIN   250 


 


    NS (0.9%)  665 659 


 


  IVPB  791 511 


 


    ABX - CEFEPIME  50  


 


    ABX - VANCOMYCIN  499 290 


 


    ABX - ZOSYN   221 


 


    NS (0.9%)  242  


 


  Medicated IV  166 793 


 


    CC - Norepinephrine/   292 





    Levophed    


 


    CC - Propofol/Diprivan  166 501 


 


  Oral   0 


 


  Tube Feeding Flush Amount   60 


 


  NG Tube Irrigate Amount   100 


 


Output:    


 


  NG Tube Drainage Amount  200 550 


 


  [place]  [phone]





 








Labs: 


 Laboratory Results - last 24 hr











  10/03/18 10/04/18 10/04/18





  10:35 04:25 04:25


 


Hgb   10.9 L 


 


Hct   35 L 


 


INR (Anticoag Therapy)   


 


VBG pH  7.32 L  


 


VBG pCO2  68 H  


 


VBG pO2  43  


 


VBG HCO3  30.0 H  


 


VBG O2 Saturation  82.1 H  


 


VBG Base Excess  7.0 H  


 


Sodium    141


 


Potassium    4.0


 


Chloride    101


 


Carbon Dioxide    34 H


 


Anion Gap    6


 


BUN    75 H


 


Creatinine    3.16 H


 


Est GFR ( Amer)    23.8


 


Est GFR (Non-Af Amer)    19.6


 


BUN/Creatinine Ratio    23.7 H


 


Glucose    113 H


 


Calcium    8.4 L


 


Phosphorus    4.1


 


Magnesium    2.6














  10/04/18





  04:25


 


Hgb 


 


Hct 


 


INR (Anticoag Therapy)  2.93 H


 


VBG pH 


 


VBG pCO2 


 


VBG pO2 


 


VBG HCO3 


 


VBG O2 Saturation 


 


VBG Base Excess 


 


Sodium 


 


Potassium 


 


Chloride 


 


Carbon Dioxide 


 


Anion Gap 


 


BUN 


 


Creatinine 


 


Est GFR ( Amer) 


 


Est GFR (Non-Af Amer) 


 


BUN/Creatinine Ratio 


 


Glucose 


 


Calcium 


 


Phosphorus 


 


Magnesium 











Studies: 





CXR 10/3


1. LIMITED STUDY.


2. LINES AND TUBES AS ABOVE.


3. THERE HAS BEEN INTERVAL DEVELOPMENT OF COMPLETE OPACIFICATION OF LEFT 

HEMITHORAX WHICH


LIKELY REPRESENTS COMBINATION OF LARGE LEFT PLEURAL EFFUSION AND LEFT LUNG 

ATELECTASIS


VERSUS CONSOLIDATION.


4. PULMONARY INTERSTITIAL EDEMA.





Bronchoscopy 10/3


Description: Bronchoscope was introduced via the ETT and advanced into the 

trachea. The amanda was visualized and was sharp. The scope was passed into the 

left mainstem, left upper lobe, linguala, and left lower lobe bronchi. BAL was 

performed in the Lingula. The scope was then passed to the right mainstem 

bronchus, then to the RUL, RML, and RLL. There was a moderate amount of mucus 

through out both lungs. No endobronchial lesions were visualized. The patient 

tolerated the procedure well will out immediate complications.





Impression: 





68M with htn, chf, afib s/p ppm on coumadin, ckd, hi/ohs, h/o cva, recurrent 

uti, bph, morbid obesity presents with AMS and dyspnea. Intubated for 

hypercapnic respiratory failure. Septic Shock 2/2 uti.  





Plan: 





Neuro - AMS


- 2/2 hypercapnea and sepsis





CV - htn, afib, chf, shock


- shock 2/2 sepsis


- levophed for bp support


- hold antihypertensives


- prior tte with poor windows


- monitor on tele


- c/w coumadin for ac


- lasix x 1 today





Pulm - hypercapnic respiratory failure, mucus plug


- 2/2 hi/ohs


- wean vent as tolerated


- allow for higher PEEP given body habitus


- bronch with moderate amount of secretions





ID - septic shock 


- 2/2 uti vs pna


- empiric vanc/zosyn


- UCx with proteus and e faecalis


- serial lactates





GI - tube feeds





Renal - ckd


- monitor i/o


- monitor lytes





Heme - monitor cbc





Endo - check fs, niss





Lines - RIJ TLC (10/3/18)





PPx - gi/dvt





Full Code





Critical Care Time: 60 mins

## 2018-10-05 LAB
BASOPHILS # BLD AUTO: 0.1 10^3/UL (ref 0–0.2)
EOSINOPHIL # BLD AUTO: 0.2 10^3/UL (ref 0–0.6)
HCT VFR BLD AUTO: 34 % (ref 42–52)
HGB BLD-MCNC: 11 G/DL (ref 14–18)
INR PPP/BLD: 3.5 (ref 0.77–1.02)
LYMPHOCYTES # BLD AUTO: 0.6 10^3/UL (ref 1–4.8)
MCH RBC QN AUTO: 26 PG (ref 27–31)
MCHC RBC AUTO-ENTMCNC: 32 G/DL (ref 31–36)
MCV RBC AUTO: 79 FL (ref 80–94)
MONOCYTES # BLD AUTO: 0.6 10^3/UL (ref 0–0.8)
NEUTROPHILS # BLD AUTO: 8.7 10^3/UL (ref 1.5–7.7)
NRBC # BLD AUTO: 0 10^3/UL
NRBC BLD QL AUTO: 0.1
PLATELET # BLD AUTO: 261 10^3/UL (ref 150–450)
RBC # BLD AUTO: 4.32 10^6/UL (ref 4–5.4)
WBC # BLD AUTO: 10.1 10^3/UL (ref 3.5–10.8)

## 2018-10-05 RX ADMIN — LANSOPRAZOLE SCH MG: KIT at 08:58

## 2018-10-05 RX ADMIN — PROPOFOL SCH MLS/HR: 10 INJECTION, EMULSION INTRAVENOUS at 06:57

## 2018-10-05 RX ADMIN — MOMETASONE FUROATE AND FORMOTEROL FUMARATE DIHYDRATE SCH: 200; 5 AEROSOL RESPIRATORY (INHALATION) at 20:18

## 2018-10-05 RX ADMIN — PIPERACILLIN AND TAZOBACTAM SCH MLS/HR: 3; .375 INJECTION, POWDER, LYOPHILIZED, FOR SOLUTION INTRAVENOUS; PARENTERAL at 16:16

## 2018-10-05 RX ADMIN — FINASTERIDE SCH MG: 5 TABLET, FILM COATED ORAL at 08:57

## 2018-10-05 RX ADMIN — NOREPINEPHRINE BITARTRATE SCH MLS/HR: 1 INJECTION INTRAVENOUS at 14:00

## 2018-10-05 RX ADMIN — PROPOFOL SCH MLS/HR: 10 INJECTION, EMULSION INTRAVENOUS at 22:24

## 2018-10-05 RX ADMIN — ASPIRIN SCH MG: 81 TABLET, CHEWABLE ORAL at 08:57

## 2018-10-05 RX ADMIN — MOMETASONE FUROATE AND FORMOTEROL FUMARATE DIHYDRATE SCH: 200; 5 AEROSOL RESPIRATORY (INHALATION) at 09:20

## 2018-10-05 RX ADMIN — AMPICILLIN SODIUM SCH MLS/HR: 1 INJECTION, POWDER, FOR SOLUTION INTRAVENOUS at 14:00

## 2018-10-05 RX ADMIN — PROPOFOL SCH MLS/HR: 10 INJECTION, EMULSION INTRAVENOUS at 04:25

## 2018-10-05 RX ADMIN — PROPOFOL SCH MLS/HR: 10 INJECTION, EMULSION INTRAVENOUS at 01:07

## 2018-10-05 RX ADMIN — AMPICILLIN SODIUM SCH MLS/HR: 1 INJECTION, POWDER, FOR SOLUTION INTRAVENOUS at 19:54

## 2018-10-05 RX ADMIN — CHLORHEXIDINE GLUCONATE 0.12% ORAL RINSE SCH ML: 1.2 LIQUID ORAL at 14:00

## 2018-10-05 RX ADMIN — CHLORHEXIDINE GLUCONATE 0.12% ORAL RINSE SCH ML: 1.2 LIQUID ORAL at 19:54

## 2018-10-05 RX ADMIN — PROPOFOL SCH MLS/HR: 10 INJECTION, EMULSION INTRAVENOUS at 20:02

## 2018-10-05 RX ADMIN — FENTANYL CITRATE PRN MCG: 0.05 INJECTION, SOLUTION INTRAMUSCULAR; INTRAVENOUS at 09:57

## 2018-10-05 RX ADMIN — PROPOFOL SCH MLS/HR: 10 INJECTION, EMULSION INTRAVENOUS at 17:06

## 2018-10-05 RX ADMIN — PIPERACILLIN AND TAZOBACTAM SCH MLS/HR: 3; .375 INJECTION, POWDER, LYOPHILIZED, FOR SOLUTION INTRAVENOUS; PARENTERAL at 08:05

## 2018-10-05 RX ADMIN — FENTANYL CITRATE PRN MCG: 0.05 INJECTION, SOLUTION INTRAMUSCULAR; INTRAVENOUS at 04:34

## 2018-10-05 RX ADMIN — TAMSULOSIN HYDROCHLORIDE SCH MG: 0.4 CAPSULE ORAL at 19:54

## 2018-10-05 RX ADMIN — PROPOFOL SCH MLS/HR: 10 INJECTION, EMULSION INTRAVENOUS at 14:02

## 2018-10-05 RX ADMIN — CHLORHEXIDINE GLUCONATE 0.12% ORAL RINSE SCH ML: 1.2 LIQUID ORAL at 02:35

## 2018-10-05 RX ADMIN — PROPOFOL SCH MLS/HR: 10 INJECTION, EMULSION INTRAVENOUS at 11:21

## 2018-10-05 RX ADMIN — CHLORHEXIDINE GLUCONATE 0.12% ORAL RINSE SCH ML: 1.2 LIQUID ORAL at 16:16

## 2018-10-05 RX ADMIN — FENTANYL CITRATE PRN MCG: 0.05 INJECTION, SOLUTION INTRAMUSCULAR; INTRAVENOUS at 14:28

## 2018-10-05 RX ADMIN — CHLORHEXIDINE GLUCONATE 0.12% ORAL RINSE SCH ML: 1.2 LIQUID ORAL at 23:33

## 2018-10-05 RX ADMIN — CHLORHEXIDINE GLUCONATE 0.12% ORAL RINSE SCH ML: 1.2 LIQUID ORAL at 08:05

## 2018-10-05 NOTE — PN
Date of Service: 10/05/18


Critical Care Services: 





68M with htn, chf, afib s/p ppm on coumadin, ckd, hi/ohs, h/o cva, recurrent 

uti, bph, morbid obesity presents with AMS and dyspnea. Intubated for 

hypercapnic respiratory failure. Septic Shock 2/2 uti.  





10/3: TLC placed. Levophed ordered. CXR with complete white of left lung. 

Family called for consent for bronchoscopy.


10/4: s/p bronch yesterday with moderate amount of secretions. vent weaned to 40

% fio2. Failed SBT this am. UCx with proteus and e faecalis.


10/5: proteus/enterococcus both sensitive to ampicillin. Tolerated SBT for 

short period today.





Vital Signs: 











Temp Pulse Resp BP SpO2 FiO2


 


98.6 F 70 19 87/47 96 40


 


10/05/18 10:00 10/05/18 10:00 10/05/18 10:00 10/05/18 10:00 10/05/18 10:00 10/05

/18 08:00











Physical Exam: 





Gen - intubated and sedated. morbidly obese


Heent - ncat, perrl


neck - +rij tlc


cv - s1/s2, distant heart sounds


lungs - dec bs bilaterally


abd - soft, nt, nd


ext - trace edema


neuro - unresponsive





Fluid Balance (Past 24 Hours): 


I=     O=     Net 


 Intake & Output











 10/03/18 10/04/18 10/05/18 10/06/18





 06:59 06:59 06:59 06:59


 


Intake Total 1622 2373 2210 0


 


Output Total 669 4165 3622 240


 


Balance 957 -1792 -1412 -240


 


Weight 267.846 kg 264.898 kg 266.304 kg 


 


Intake:    


 


  IV Fluids 665 909 98 


 


    ABX - VANCOMYCIN  250  


 


    NS (0.9%) 665 659 98 


 


  IVPB 791 511 312 


 


    ABX - CEFEPIME 50   


 


    ABX - VANCOMYCIN 499 290  


 


    ABX - ZOSYN  221 312 


 


    NS (0.9%) 242   


 


  Medicated  085 4867 


 


    CC - Norepinephrine/  292 472 





    Levophed    


 


    CC - Propofol/Diprivan 166 501 822 


 


  Oral  0 0 0


 


  Tube Feeding   286 


 


  Tube Feeding Flush Amount  60 100 


 


  NG Tube Irrigate Amount  100 120 


 


Output:    


 


  NG Tube Drainage Amount 200 550 100 


 


  G Tube   400 


 


  Urine   0 


 


  [place] 465 3615 3122 240





 








Labs: 


 Laboratory Results - last 24 hr











  10/05/18 10/05/18 10/05/18





  04:15 04:15 04:15


 


WBC   10.1 


 


RBC   4.32 


 


Hgb   11.0 L 


 


Hct   34 L 


 


MCV   79 L 


 


MCH   26 L 


 


MCHC   32 


 


RDW   17 H 


 


Plt Count   261 


 


MPV   7.7 


 


Neut % (Auto)   85.6 H 


 


Lymph % (Auto)   5.9 L 


 


Mono % (Auto)   5.5 


 


Eos % (Auto)   1.9 


 


Baso % (Auto)   1.1 


 


Absolute Neuts (auto)   8.7 H 


 


Absolute Lymphs (auto)   0.6 L 


 


Absolute Monos (auto)   0.6 


 


Absolute Eos (auto)   0.2 


 


Absolute Basos (auto)   0.1 


 


Absolute Nucleated RBC   0 


 


Nucleated RBC %   0.1 


 


INR (Anticoag Therapy)    3.50 H


 


Sodium  144  


 


Potassium  3.7  


 


Chloride  103  


 


Carbon Dioxide  35 H  


 


Anion Gap  6  


 


BUN  76 H  


 


Creatinine  3.22 H  


 


Est GFR ( Amer)  23.3  


 


Est GFR (Non-Af Amer)  19.2  


 


BUN/Creatinine Ratio  23.6 H  


 


Glucose  131 H  


 


Calcium  8.4 L  


 


Phosphorus  4.2  


 


Magnesium  2.7  


 


Random Vancomycin   














  10/05/18





  09:22


 


WBC 


 


RBC 


 


Hgb 


 


Hct 


 


MCV 


 


MCH 


 


MCHC 


 


RDW 


 


Plt Count 


 


MPV 


 


Neut % (Auto) 


 


Lymph % (Auto) 


 


Mono % (Auto) 


 


Eos % (Auto) 


 


Baso % (Auto) 


 


Absolute Neuts (auto) 


 


Absolute Lymphs (auto) 


 


Absolute Monos (auto) 


 


Absolute Eos (auto) 


 


Absolute Basos (auto) 


 


Absolute Nucleated RBC 


 


Nucleated RBC % 


 


INR (Anticoag Therapy) 


 


Sodium 


 


Potassium 


 


Chloride 


 


Carbon Dioxide 


 


Anion Gap 


 


BUN 


 


Creatinine 


 


Est GFR ( Amer) 


 


Est GFR (Non-Af Amer) 


 


BUN/Creatinine Ratio 


 


Glucose 


 


Calcium 


 


Phosphorus 


 


Magnesium 


 


Random Vancomycin  20.6











Studies: 





CXR 10/3


1. LIMITED STUDY.


2. LINES AND TUBES AS ABOVE.


3. THERE HAS BEEN INTERVAL DEVELOPMENT OF COMPLETE OPACIFICATION OF LEFT 

HEMITHORAX WHICH


LIKELY REPRESENTS COMBINATION OF LARGE LEFT PLEURAL EFFUSION AND LEFT LUNG 

ATELECTASIS


VERSUS CONSOLIDATION.


4. PULMONARY INTERSTITIAL EDEMA.





Bronchoscopy 10/3


Description: Bronchoscope was introduced via the ETT and advanced into the 

trachea. The amanda was visualized and was sharp. The scope was passed into the 

left mainstem, left upper lobe, linguala, and left lower lobe bronchi. BAL was 

performed in the Lingula. The scope was then passed to the right mainstem 

bronchus, then to the RUL, RML, and RLL. There was a moderate amount of mucus 

through out both lungs. No endobronchial lesions were visualized. The patient 

tolerated the procedure well will out immediate complications.





Impression: 





68M with htn, chf, afib s/p ppm on coumadin, ckd, hi/ohs, h/o cva, recurrent 

uti, bph, morbid obesity presents with AMS and dyspnea. Intubated for 

hypercapnic respiratory failure. Septic Shock 2/2 uti.  





Plan: 





Neuro - AMS


- 2/2 hypercapnea and sepsis





CV - htn, afib, chf, shock


- shock 2/2 sepsis


- levophed for bp support


- hold antihypertensives


- prior tte with poor windows


- monitor on tele


- c/w coumadin for ac





Pulm - hypercapnic respiratory failure, mucus plug


- 2/2 hi/ohs


- wean vent as tolerated


- allow for higher PEEP given body habitus


- bronch with moderate amount of secretions


- toelrated SBT for short period today


- repeat SBT tomorrow





ID - septic shock 


- 2/2 uti


- UCx with proteus and e faecalis


- switch abx to ampicillin





GI - tube feeds





Renal - ckd


- monitor i/o


- monitor lytes





Heme - monitor cbc





Endo - check fs, niss





Lines - RIJ TLC (10/3/18)





PPx - gi/dvt





Full Code





Critical Care Time: 70 mins

## 2018-10-06 LAB
BASOPHILS # BLD AUTO: 0.1 10^3/UL (ref 0–0.2)
EOSINOPHIL # BLD AUTO: 0.2 10^3/UL (ref 0–0.6)
HCT VFR BLD AUTO: 33 % (ref 42–52)
HGB BLD-MCNC: 10.4 G/DL (ref 14–18)
INR PPP/BLD: 3.7 (ref 0.77–1.02)
LYMPHOCYTES # BLD AUTO: 0.5 10^3/UL (ref 1–4.8)
MCH RBC QN AUTO: 25 PG (ref 27–31)
MCHC RBC AUTO-ENTMCNC: 32 G/DL (ref 31–36)
MCV RBC AUTO: 79 FL (ref 80–94)
MONOCYTES # BLD AUTO: 0.4 10^3/UL (ref 0–0.8)
NEUTROPHILS # BLD AUTO: 5.9 10^3/UL (ref 1.5–7.7)
NRBC # BLD AUTO: 0 10^3/UL
NRBC BLD QL AUTO: 0
PLATELET # BLD AUTO: 228 10^3/UL (ref 150–450)
RBC # BLD AUTO: 4.18 10^6/UL (ref 4–5.4)
WBC # BLD AUTO: 7 10^3/UL (ref 3.5–10.8)

## 2018-10-06 RX ADMIN — AMPICILLIN SODIUM SCH MLS/HR: 1 INJECTION, POWDER, FOR SOLUTION INTRAVENOUS at 02:44

## 2018-10-06 RX ADMIN — LANSOPRAZOLE SCH MG: KIT at 05:14

## 2018-10-06 RX ADMIN — MOMETASONE FUROATE AND FORMOTEROL FUMARATE DIHYDRATE SCH PUFF: 200; 5 AEROSOL RESPIRATORY (INHALATION) at 19:21

## 2018-10-06 RX ADMIN — PIPERACILLIN AND TAZOBACTAM SCH MLS/HR: 3; .375 INJECTION, POWDER, LYOPHILIZED, FOR SOLUTION INTRAVENOUS; PARENTERAL at 15:11

## 2018-10-06 RX ADMIN — ASPIRIN SCH MG: 81 TABLET, CHEWABLE ORAL at 07:37

## 2018-10-06 RX ADMIN — AMPICILLIN SODIUM SCH MLS/HR: 1 INJECTION, POWDER, FOR SOLUTION INTRAVENOUS at 18:17

## 2018-10-06 RX ADMIN — PIPERACILLIN AND TAZOBACTAM SCH MLS/HR: 3; .375 INJECTION, POWDER, LYOPHILIZED, FOR SOLUTION INTRAVENOUS; PARENTERAL at 02:44

## 2018-10-06 RX ADMIN — FENTANYL CITRATE PRN MCG: 0.05 INJECTION, SOLUTION INTRAMUSCULAR; INTRAVENOUS at 08:12

## 2018-10-06 RX ADMIN — PROPOFOL SCH MLS/HR: 10 INJECTION, EMULSION INTRAVENOUS at 02:44

## 2018-10-06 RX ADMIN — CHLORHEXIDINE GLUCONATE 0.12% ORAL RINSE SCH ML: 1.2 LIQUID ORAL at 04:45

## 2018-10-06 RX ADMIN — PROPOFOL SCH MLS/HR: 10 INJECTION, EMULSION INTRAVENOUS at 00:40

## 2018-10-06 RX ADMIN — TAMSULOSIN HYDROCHLORIDE SCH MG: 0.4 CAPSULE ORAL at 19:23

## 2018-10-06 RX ADMIN — AMPICILLIN SODIUM SCH MLS/HR: 1 INJECTION, POWDER, FOR SOLUTION INTRAVENOUS at 13:03

## 2018-10-06 RX ADMIN — MOMETASONE FUROATE AND FORMOTEROL FUMARATE DIHYDRATE SCH: 200; 5 AEROSOL RESPIRATORY (INHALATION) at 09:30

## 2018-10-06 RX ADMIN — CHLORHEXIDINE GLUCONATE 0.12% ORAL RINSE SCH ML: 1.2 LIQUID ORAL at 07:37

## 2018-10-06 RX ADMIN — PROPOFOL SCH MLS/HR: 10 INJECTION, EMULSION INTRAVENOUS at 04:44

## 2018-10-06 RX ADMIN — PIPERACILLIN AND TAZOBACTAM SCH MLS/HR: 3; .375 INJECTION, POWDER, LYOPHILIZED, FOR SOLUTION INTRAVENOUS; PARENTERAL at 07:56

## 2018-10-06 RX ADMIN — AMPICILLIN SODIUM SCH MLS/HR: 1 INJECTION, POWDER, FOR SOLUTION INTRAVENOUS at 07:35

## 2018-10-06 RX ADMIN — PROPOFOL SCH MLS/HR: 10 INJECTION, EMULSION INTRAVENOUS at 07:25

## 2018-10-06 RX ADMIN — FINASTERIDE SCH MG: 5 TABLET, FILM COATED ORAL at 07:37

## 2018-10-06 NOTE — PN
Date of Service: 10/06/18


Critical Care Services: 





68M with htn, chf, afib s/p ppm on coumadin, ckd, hi/ohs, h/o cva, recurrent 

uti, bph, morbid obesity presents with AMS and dyspnea. Intubated for 

hypercapnic respiratory failure. Septic Shock 2/2 uti.  





10/3: TLC placed. Levophed ordered. CXR with complete white of left lung. 

Family called for consent for bronchoscopy.


10/4: s/p bronch yesterday with moderate amount of secretions. vent weaned to 40

% fio2. Failed SBT this am. UCx with proteus and e faecalis.


10/5: proteus/enterococcus both sensitive to ampicillin. Tolerated SBT for 

short period today.


10/6: Doing well on spontaenous. Plan for extubation when family arrives.





Vital Signs: 











Temp Pulse Resp BP SpO2 FiO2


 


97.5 F 70 26 91/54 95 40


 


10/06/18 08:00 10/06/18 09:01 10/06/18 09:00 10/06/18 09:01 10/06/18 09:01 10/06

/18 08:37











Physical Exam: 





Gen - intubated. morbidly obese


Heent - ncat, perrl


neck - +rij tlc


cv - s1/s2, distant heart sounds


lungs - dec bs bilaterally


abd - soft, nt, nd


ext - trace edema


neuro - awake. following commands.





Fluid Balance (Past 24 Hours): 


I=     O=     Net 


 Intake & Output











 10/04/18 10/05/18 10/06/18 10/07/18





 06:59 06:59 06:59 06:59


 


Intake Total 2373 2210 1822 


 


Output Total 4165 3622 1640 314


 


Balance -1792 -1412 182 -314


 


Weight 264.898 kg 266.304 kg 223.6 kg 


 


Intake:    


 


  IV Fluids 909 98 338 


 


    ABX - VANCOMYCIN 250   


 


    NS (0.9%) 659 98 338 


 


  IVPB 511 312 111 


 


    ABX - VANCOMYCIN 290   


 


    ABX - ZOSYN 221 312  


 


    NS (0.9%)   111 


 


  Medicated  1294 723 


 


    CC - Norepinephrine/ 292 472 195 





    Levophed    


 


    CC - Propofol/Diprivan 501 822 528 


 


  Oral 0 0 0 


 


  Tube Feeding  286 650 


 


  Tube Feeding Flush Amount 60 100  


 


  NG Tube Irrigate Amount 100 120  


 


Output:    


 


  NG Tube Drainage Amount 550 100  


 


  G Tube  400  


 


  Urine  0  


 


  [place] 3615 3122 1640 314





 








Labs: 


 Laboratory Results - last 24 hr











  10/05/18 10/06/18 10/06/18





  09:22 04:55 04:55


 


WBC   7.0 


 


RBC   4.18 


 


Hgb   10.4 L 


 


Hct   33 L 


 


MCV   79 L 


 


MCH   25 L 


 


MCHC   32 


 


RDW   17 H 


 


Plt Count   228 


 


MPV   7.4 


 


Neut % (Auto)   83.9 H 


 


Lymph % (Auto)   6.8 L 


 


Mono % (Auto)   6.1 


 


Eos % (Auto)   2.4 


 


Baso % (Auto)   0.8 


 


Absolute Neuts (auto)   5.9 


 


Absolute Lymphs (auto)   0.5 L 


 


Absolute Monos (auto)   0.4 


 


Absolute Eos (auto)   0.2 


 


Absolute Basos (auto)   0.1 


 


Absolute Nucleated RBC   0 


 


Nucleated RBC %   0 


 


INR (Anticoag Therapy)   


 


Sodium    146 H


 


Potassium    3.8


 


Chloride    105


 


Carbon Dioxide    36 H


 


Anion Gap    5


 


BUN    74 H


 


Creatinine    2.96 H


 


Est GFR ( Amer)    25.6


 


Est GFR (Non-Af Amer)    21.2


 


BUN/Creatinine Ratio    25.0 H


 


Glucose    101 H


 


Calcium    8.4 L


 


Magnesium    2.8 H


 


Total Bilirubin    0.60


 


AST    48 H


 


ALT    21


 


Alkaline Phosphatase    130 H


 


Total Protein    5.8 L


 


Albumin    2.6 L


 


Globulin    3.2


 


Albumin/Globulin Ratio    0.8 L


 


Random Vancomycin  20.6  














  10/06/18





  04:55


 


WBC 


 


RBC 


 


Hgb 


 


Hct 


 


MCV 


 


MCH 


 


MCHC 


 


RDW 


 


Plt Count 


 


MPV 


 


Neut % (Auto) 


 


Lymph % (Auto) 


 


Mono % (Auto) 


 


Eos % (Auto) 


 


Baso % (Auto) 


 


Absolute Neuts (auto) 


 


Absolute Lymphs (auto) 


 


Absolute Monos (auto) 


 


Absolute Eos (auto) 


 


Absolute Basos (auto) 


 


Absolute Nucleated RBC 


 


Nucleated RBC % 


 


INR (Anticoag Therapy)  3.70 H


 


Sodium 


 


Potassium 


 


Chloride 


 


Carbon Dioxide 


 


Anion Gap 


 


BUN 


 


Creatinine 


 


Est GFR ( Amer) 


 


Est GFR (Non-Af Amer) 


 


BUN/Creatinine Ratio 


 


Glucose 


 


Calcium 


 


Magnesium 


 


Total Bilirubin 


 


AST 


 


ALT 


 


Alkaline Phosphatase 


 


Total Protein 


 


Albumin 


 


Globulin 


 


Albumin/Globulin Ratio 


 


Random Vancomycin 











Studies: 





CXR 10/3


1. LIMITED STUDY.


2. LINES AND TUBES AS ABOVE.


3. THERE HAS BEEN INTERVAL DEVELOPMENT OF COMPLETE OPACIFICATION OF LEFT 

HEMITHORAX WHICH


LIKELY REPRESENTS COMBINATION OF LARGE LEFT PLEURAL EFFUSION AND LEFT LUNG 

ATELECTASIS


VERSUS CONSOLIDATION.


4. PULMONARY INTERSTITIAL EDEMA.





Bronchoscopy 10/3


Description: Bronchoscope was introduced via the ETT and advanced into the 

trachea. The amanda was visualized and was sharp. The scope was passed into the 

left mainstem, left upper lobe, linguala, and left lower lobe bronchi. BAL was 

performed in the Lingula. The scope was then passed to the right mainstem 

bronchus, then to the RUL, RML, and RLL. There was a moderate amount of mucus 

through out both lungs. No endobronchial lesions were visualized. The patient 

tolerated the procedure well will out immediate complications.








Impression: 





68M with htn, chf, afib s/p ppm on coumadin, ckd, hi/ohs, h/o cva, recurrent 

uti, bph, morbid obesity presents with AMS and dyspnea. Intubated for 

hypercapnic respiratory failure. Septic Shock 2/2 uti.  





Plan: 





Neuro - AMS


- 2/2 hypercapnea and sepsis





CV - htn, afib, chf, shock


- shock 2/2 sepsis  - improved


- off levophed


- hold antihypertensives


- prior tte with poor windows


- monitor on tele


- c/w coumadin for ac





Pulm - hypercapnic respiratory failure, mucus plug


- 2/2 hi/ohs


- tolerating sbt today


- plan for extubation to bipap when family arrives





ID - septic shock 


- 2/2 uti - improving


- UCx with proteus and e faecalis


- switch abx to ampicillin





GI - hold tube feeds for extubation





Renal - ckd


- monitor i/o


- monitor lytes





Heme - monitor cbc





Endo - check fs, niss





Lines - RIJ TLC (10/3/18)





PPx - gi/dvt





Full Code





Critical Care Time: 60 mins

## 2018-10-07 LAB
BASOPHILS # BLD AUTO: 0.1 10^3/UL (ref 0–0.2)
EOSINOPHIL # BLD AUTO: 0.2 10^3/UL (ref 0–0.6)
HCT VFR BLD AUTO: 32 % (ref 42–52)
HGB BLD-MCNC: 10.1 G/DL (ref 14–18)
INR PPP/BLD: 3.38 (ref 0.77–1.02)
LYMPHOCYTES # BLD AUTO: 0.6 10^3/UL (ref 1–4.8)
MCH RBC QN AUTO: 25 PG (ref 27–31)
MCHC RBC AUTO-ENTMCNC: 32 G/DL (ref 31–36)
MCV RBC AUTO: 80 FL (ref 80–94)
MONOCYTES # BLD AUTO: 0.4 10^3/UL (ref 0–0.8)
NEUTROPHILS # BLD AUTO: 5.5 10^3/UL (ref 1.5–7.7)
NRBC # BLD AUTO: 0 10^3/UL
NRBC BLD QL AUTO: 0.1
PLATELET # BLD AUTO: 204 10^3/UL (ref 150–450)
RBC # BLD AUTO: 4.03 10^6/UL (ref 4–5.4)
WBC # BLD AUTO: 6.8 10^3/UL (ref 3.5–10.8)

## 2018-10-07 PROCEDURE — 0BP1XDZ REMOVAL OF INTRALUMINAL DEVICE FROM TRACHEA, EXTERNAL APPROACH: ICD-10-PCS | Performed by: HOSPITALIST

## 2018-10-07 RX ADMIN — FENTANYL CITRATE PRN MCG: 0.05 INJECTION, SOLUTION INTRAMUSCULAR; INTRAVENOUS at 21:33

## 2018-10-07 RX ADMIN — TAMSULOSIN HYDROCHLORIDE SCH MG: 0.4 CAPSULE ORAL at 21:31

## 2018-10-07 RX ADMIN — AMPICILLIN SODIUM SCH MLS/HR: 1 INJECTION, POWDER, FOR SOLUTION INTRAVENOUS at 00:43

## 2018-10-07 RX ADMIN — PIPERACILLIN AND TAZOBACTAM SCH MLS/HR: 3; .375 INJECTION, POWDER, LYOPHILIZED, FOR SOLUTION INTRAVENOUS; PARENTERAL at 00:43

## 2018-10-07 RX ADMIN — FINASTERIDE SCH MG: 5 TABLET, FILM COATED ORAL at 08:08

## 2018-10-07 RX ADMIN — MOMETASONE FUROATE AND FORMOTEROL FUMARATE DIHYDRATE SCH PUFF: 200; 5 AEROSOL RESPIRATORY (INHALATION) at 20:04

## 2018-10-07 RX ADMIN — AMPICILLIN SODIUM SCH MLS/HR: 1 INJECTION, POWDER, FOR SOLUTION INTRAVENOUS at 05:57

## 2018-10-07 RX ADMIN — AMPICILLIN SODIUM SCH MLS/HR: 1 INJECTION, POWDER, FOR SOLUTION INTRAVENOUS at 21:32

## 2018-10-07 RX ADMIN — LANSOPRAZOLE SCH: KIT at 05:25

## 2018-10-07 RX ADMIN — ASPIRIN SCH MG: 81 TABLET, CHEWABLE ORAL at 08:08

## 2018-10-07 RX ADMIN — PIPERACILLIN AND TAZOBACTAM SCH MLS/HR: 3; .375 INJECTION, POWDER, LYOPHILIZED, FOR SOLUTION INTRAVENOUS; PARENTERAL at 08:07

## 2018-10-07 RX ADMIN — MOMETASONE FUROATE AND FORMOTEROL FUMARATE DIHYDRATE SCH PUFF: 200; 5 AEROSOL RESPIRATORY (INHALATION) at 07:34

## 2018-10-07 RX ADMIN — AMPICILLIN SODIUM SCH MLS/HR: 1 INJECTION, POWDER, FOR SOLUTION INTRAVENOUS at 13:40

## 2018-10-08 LAB
BASOPHILS # BLD AUTO: 0.1 10^3/UL (ref 0–0.2)
EOSINOPHIL # BLD AUTO: 0.3 10^3/UL (ref 0–0.6)
HCT VFR BLD AUTO: 34 % (ref 42–52)
HGB BLD-MCNC: 10.4 G/DL (ref 14–18)
INR PPP/BLD: 3.23 (ref 0.77–1.02)
LYMPHOCYTES # BLD AUTO: 0.6 10^3/UL (ref 1–4.8)
MCH RBC QN AUTO: 25 PG (ref 27–31)
MCHC RBC AUTO-ENTMCNC: 31 G/DL (ref 31–36)
MCV RBC AUTO: 79 FL (ref 80–94)
MONOCYTES # BLD AUTO: 0.4 10^3/UL (ref 0–0.8)
NEUTROPHILS # BLD AUTO: 5.8 10^3/UL (ref 1.5–7.7)
NRBC # BLD AUTO: 0 10^3/UL
NRBC BLD QL AUTO: 0.2
PLATELET # BLD AUTO: 205 10^3/UL (ref 150–450)
RBC # BLD AUTO: 4.22 10^6/UL (ref 4–5.4)
WBC # BLD AUTO: 7.1 10^3/UL (ref 3.5–10.8)

## 2018-10-08 RX ADMIN — AMPICILLIN SODIUM SCH MLS/HR: 1 INJECTION, POWDER, FOR SOLUTION INTRAVENOUS at 07:32

## 2018-10-08 RX ADMIN — TAMSULOSIN HYDROCHLORIDE SCH MG: 0.4 CAPSULE ORAL at 21:10

## 2018-10-08 RX ADMIN — MOMETASONE FUROATE AND FORMOTEROL FUMARATE DIHYDRATE SCH PUFF: 200; 5 AEROSOL RESPIRATORY (INHALATION) at 08:11

## 2018-10-08 RX ADMIN — FENTANYL CITRATE PRN MCG: 0.05 INJECTION, SOLUTION INTRAMUSCULAR; INTRAVENOUS at 06:10

## 2018-10-08 RX ADMIN — ASPIRIN SCH MG: 81 TABLET, CHEWABLE ORAL at 11:28

## 2018-10-08 RX ADMIN — AMPICILLIN SODIUM SCH MLS/HR: 1 INJECTION, POWDER, FOR SOLUTION INTRAVENOUS at 13:20

## 2018-10-08 RX ADMIN — FINASTERIDE SCH MG: 5 TABLET, FILM COATED ORAL at 11:28

## 2018-10-08 RX ADMIN — OMEPRAZOLE SCH MG: 20 CAPSULE, DELAYED RELEASE ORAL at 06:10

## 2018-10-08 RX ADMIN — FENTANYL CITRATE PRN MCG: 0.05 INJECTION, SOLUTION INTRAMUSCULAR; INTRAVENOUS at 21:10

## 2018-10-08 RX ADMIN — MOMETASONE FUROATE AND FORMOTEROL FUMARATE DIHYDRATE SCH PUFF: 200; 5 AEROSOL RESPIRATORY (INHALATION) at 20:32

## 2018-10-08 RX ADMIN — AMPICILLIN SODIUM SCH MLS/HR: 1 INJECTION, POWDER, FOR SOLUTION INTRAVENOUS at 18:49

## 2018-10-08 RX ADMIN — AMPICILLIN SODIUM SCH MLS/HR: 1 INJECTION, POWDER, FOR SOLUTION INTRAVENOUS at 03:05

## 2018-10-08 NOTE — PN
Subjective


Date of Service: 10/08/18


Interval History: 





Patient is feeling well today. Patient is mainly concerned about the use of his 

right arm which has been weak since his admission. Patient denies F/C, N/V,  

abdominal pain, diarrhea, dizziness, or other pain. 


Family History: Unchanged from Admission


Social History: Unchanged from Admission


Past Medical History: Unchanged from Admission





Objective


Active Medications: 








Acetaminophen (Tylenol Tab*)  650 mg PO Q4H PRN


   PRN Reason: FEVER/PAIN


Albuterol/Ipratropium (Duoneb (Albuterol 2.5 Mg/Ipratropium 0.5 Mg))  1 neb INH 

Q4H PRN


   PRN Reason: SOB/WHEEZING


Aspirin (Aspirin 81 Mg Chew Tab*)  81 mg PO DAILY [facility]


   Last Admin: 10/08/18 11:28 Dose:  81 mg


Docusate Sodium (Colace Cap*)  100 mg PO BID PRN


   PRN Reason: CONSTIPATION


Fentanyl Citrate (Fentanyl*)  50 mcg IV SLOW PU Q2HR PRN


   PRN Reason: PAIN


   Last Admin: 10/08/18 06:10 Dose:  50 mcg


Finasteride (Proscar Tab*)  5 mg PO DAILY [facility]


   Last Admin: 10/08/18 11:28 Dose:  5 mg


Ampicillin Sodium 1 gm/ Sodium (Chloride)  50 mls @ 200 mls/hr IVPB Q6H [facility]


   Stop: 10/12/18 23:59


   Last Admin: 10/08/18 13:20 Dose:  200 mls/hr


Mometasone Furoate/Formoterol Fumar (Dulera 200/5 Mdi*)  2 puff INH BID [facility]


   Last Admin: 10/08/18 08:11 Dose:  2 puff


Omeprazole (Prilosec Cap*)  20 mg PO DAILY@0600 [facility]


   Last Admin: 10/08/18 06:10 Dose:  20 mg


Ondansetron HCl (Zofran Inj*)  4 mg IV Q4H PRN


   PRN Reason: NAUSEA/VOMITING


Pharmacy Profile Note (Coumadin Per Pharmacy*)  1 note FOLLOW UP .PER PHARMACY 

PROTOC [facility]; Protocol


Senna (Senokot Tab*)  1 tab PO BID PRN


   PRN Reason: CONSTIPATION


Tamsulosin HCl (Flomax Cap*)  0.4 mg PO BEDTIME [facility]


   Last Admin: 10/07/18 21:31 Dose:  0.4 mg


Warfarin Sodium (Coumadin Tab(*))  0.5 mg PO ONCE ONE


   Stop: 10/08/18 17:01








 Vital Signs - 8 hr











  10/08/18





  11:40


 


Temperature 98.3 F


 


Pulse Rate 69


 


Respiratory 20





Rate 


 


Blood Pressure 109/41





(mmHg) 


 


O2 Sat by Pulse 95





Oximetry 











Oxygen Devices in Use Now: Nasal Cannula


Appearance: Patient is a 69yo male who appears stated age and is sitting in the 

bed in NAD.


Eyes: No Scleral Icterus, PERRLA


Ears/Nose/Mouth/Throat: NL Teeth, Lips, Gums, Clear Oropharnyx, Mucous 

Membranes Moist


Neck: NL Appearance and Movements; NL JVP, Trachea Midline


Respiratory: Symmetrical Chest Expansion and Respiratory Effort, Clear to 

Auscultation, - - Diminished throughout. 


Cardiovascular: NL Sounds; No Murmurs; No JVD, RRR, - - Trace B/L LE edema. 


Abdominal: NL Sounds; No Tenderness; No Distention, No Hepatosplenomegaly


Lymphatic: No Cervical Adenopathy


Extremities: No Clubbing, Cyanosis


Skin: No Rash or Ulcers, No Nodules or Sclerosis


Neurological: Alert and Oriented x 3, NL Sensation, - - Right sided upper and 

lower extremity weakness. 


Result Diagrams: 


 10/08/18 06:00





 10/08/18 06:00


Microbiology and Other Data: 


 Microbiology











 10/04/18 10:05 Aerobic Blood Culture - Preliminary





 Blood Venous    No Growth Day 4





 Anaerobic Blood Culture - Preliminary





    No Growth Day 4


 


 10/04/18 10:00 Aerobic Blood Culture - Preliminary





 Blood Venous    No Growth Day 4





 Anaerobic Blood Culture - Preliminary





    No Growth Day 4


 


 10/03/18 05:53 Aerobic Blood Culture - Final





 Blood Venous    Not Reportable





 Anaerobic Blood Culture - Final





    Not Reportable





 Blood Culture - Final





    No Growth Day 5


 


 10/03/18 10:05 Gram Stain - Final





 Sputum Sputum Culture - Final





    Normal Enedelia


 


 10/03/18 01:37 Urine Culture - Final





 Urine    Proteus Mirabilis





    Enterococcus Faecalis


 


 10/03/18 00:10 Nasal Screen MRSA (PCR) - Final





 Nasal    Mrsa Not Detected














Assess/Plan/Problems-Billing


Assessment: 





Patient is a 69yo male with a PMH for CKD, CVA, morbid obesity, COPD, Afib, 

urinary retention, CHF, Obesity-Hypoventilation who was admitted for acute 

hypercarbic respiratory failure with intubation and consolidation of the lung 

along with UTI who is improving slowly on antibiotics and has been transferred 

out of the ICU. 





- Patient Problems


(1) Acute respiratory failure with hypoxia and hypercarbia


Current Visit: No   Status: Acute   Priority: High   Code(s): J96.01 - ACUTE 

RESPIRATORY FAILURE WITH HYPOXIA; J96.02 - ACUTE RESPIRATORY FAILURE WITH 

HYPERCAPNIA   SNOMED Code(s): 224953751


   Comment: 


- Improving, able to be extubated. Severely increase pCO2 on admission


- Possible pneumonia with infiltrate on CXR


- Restrictive and obstructive pattern of respiratory failure noted likely due 

to obesity hypoventilation syndrome and COPD   





(2) Urinary tract infection with hematuria


Current Visit: No   Status: Acute   Priority: High   Code(s): N39.0 - URINARY 

TRACT INFECTION, SITE NOT SPECIFIED; R31.9 - HEMATURIA, UNSPECIFIED   SNOMED 

Code(s): 99354517


   Comment: 


- Recurrent, Enterococcus and Proteus


- Continue Ampicillin for this and Possible pneumonia due to improvement


- Will change alexandre   





(3) Hemiparesis of right dominant side as late effect of cerebral infarction


Current Visit: No   Status: Acute   Code(s): I69.351 - HEMIPLGA FOLLOWING 

CEREBRAL INFRC AFF RIGHT DOMINANT SIDE   SNOMED Code(s): 437192253


   Comment: 


- Stable consistent with previous fluctuating deficits


- Likely recrudescence of prior CVA in setting of UTI


- Continue Aspirin and Warfarin when indicated   





(4) Afib


Current Visit: No   Status: Chronic   Priority: Medium   Code(s): I48.91 - 

UNSPECIFIED ATRIAL FIBRILLATION   SNOMED Code(s): 13826376


   Comment: 


- Pacemaker for tachy-olinda syndrome.


- History of cardioembolic stroke, 2016.


- Continue coumdin PRN, supratherapeutic INR.    





(5) BPH (benign prostatic hyperplasia)


Current Visit: No   Status: Chronic   Priority: Medium   Code(s): N40.0 - 

BENIGN PROSTATIC HYPERPLASIA WITHOUT LOWER URINRY TRACT SYMP   SNOMED Code(s): 

455530003


   Comment: 


- Continue tamsulosin


- Chronic alexandre for urinary retention


- Recent change of alexandre per patient.


- Will change alexandre again.    





(6) KIM (obstructive sleep apnea)


Current Visit: No   Status: Chronic   Priority: Medium   Code(s): G47.33 - 

OBSTRUCTIVE SLEEP APNEA (ADULT) (PEDIATRIC)   SNOMED Code(s): 20034276


   Comment: 


- With chronic hypoxic/hypercarbic respiratory failure, on 3L O2 routinely.


- With obesity hypoventilation syndrome.


- Continue CPAP   





(7) Obesity hypoventilation syndrome


Current Visit: No   Status: Chronic   Priority: High   Code(s): E66.2 - MORBID (

SEVERE) OBESITY WITH ALVEOLAR HYPOVENTILATION   SNOMED Code(s): 821324166


   Comment: 


- Restrictive lung disease with chronic hypercarbia


- CPAP   





(8) DVT prophylaxis


Current Visit: No   Status: Acute   Priority: Medium   Code(s): KTA4910 -    

SNOMED Code(s): 837220145


   Comment: 


- Warfarin, INR supratherapeutic   





(9) Full code status


Current Visit: No   Status: Acute   Code(s): Z78.9 - OTHER SPECIFIED HEALTH 

STATUS   SNOMED Code(s): 110613499


   Comment: 


   


Status and Disposition: 





Inpatient, [facility] at discharge

## 2018-10-09 LAB
BASOPHILS # BLD AUTO: 0.1 10^3/UL (ref 0–0.2)
EOSINOPHIL # BLD AUTO: 0.2 10^3/UL (ref 0–0.6)
HCT VFR BLD AUTO: 33 % (ref 42–52)
HGB BLD-MCNC: 10.1 G/DL (ref 14–18)
INR PPP/BLD: 2.89 (ref 0.77–1.02)
LYMPHOCYTES # BLD AUTO: 0.7 10^3/UL (ref 1–4.8)
MCH RBC QN AUTO: 25 PG (ref 27–31)
MCHC RBC AUTO-ENTMCNC: 31 G/DL (ref 31–36)
MCV RBC AUTO: 79 FL (ref 80–94)
MONOCYTES # BLD AUTO: 0.4 10^3/UL (ref 0–0.8)
NEUTROPHILS # BLD AUTO: 5.7 10^3/UL (ref 1.5–7.7)
NRBC # BLD AUTO: 0 10^3/UL
NRBC BLD QL AUTO: 0.1
PLATELET # BLD AUTO: 211 10^3/UL (ref 150–450)
RBC # BLD AUTO: 4.13 10^6/UL (ref 4–5.4)
WBC # BLD AUTO: 7 10^3/UL (ref 3.5–10.8)

## 2018-10-09 RX ADMIN — MOMETASONE FUROATE AND FORMOTEROL FUMARATE DIHYDRATE SCH PUFF: 200; 5 AEROSOL RESPIRATORY (INHALATION) at 20:32

## 2018-10-09 RX ADMIN — AMPICILLIN SODIUM SCH MLS/HR: 1 INJECTION, POWDER, FOR SOLUTION INTRAVENOUS at 00:49

## 2018-10-09 RX ADMIN — AMPICILLIN SODIUM SCH MLS/HR: 1 INJECTION, POWDER, FOR SOLUTION INTRAVENOUS at 08:20

## 2018-10-09 RX ADMIN — TAMSULOSIN HYDROCHLORIDE SCH MG: 0.4 CAPSULE ORAL at 22:24

## 2018-10-09 RX ADMIN — FINASTERIDE SCH MG: 5 TABLET, FILM COATED ORAL at 08:19

## 2018-10-09 RX ADMIN — ASPIRIN SCH MG: 81 TABLET, CHEWABLE ORAL at 08:19

## 2018-10-09 RX ADMIN — MOMETASONE FUROATE AND FORMOTEROL FUMARATE DIHYDRATE SCH PUFF: 200; 5 AEROSOL RESPIRATORY (INHALATION) at 08:11

## 2018-10-09 RX ADMIN — AMPICILLIN SODIUM SCH MLS/HR: 1 INJECTION, POWDER, FOR SOLUTION INTRAVENOUS at 13:01

## 2018-10-09 RX ADMIN — OMEPRAZOLE SCH MG: 20 CAPSULE, DELAYED RELEASE ORAL at 05:11

## 2018-10-09 RX ADMIN — AMPICILLIN SODIUM SCH MLS/HR: 1 INJECTION, POWDER, FOR SOLUTION INTRAVENOUS at 19:37

## 2018-10-09 NOTE — PN
Subjective


Date of Service: 10/09/18


Interval History: 





.


Patient reports he feels better today. He denies SOB. Occasional cough. No 

sputum production. No fever or chills. Reports good appetite. 














Family History: Unchanged from Admission


Social History: Unchanged from Admission


Past Medical History: Unchanged from Admission





Objective


Active Medications: 








Acetaminophen (Tylenol Tab*)  650 mg PO Q4H PRN


   PRN Reason: FEVER/PAIN


Albuterol/Ipratropium (Duoneb (Albuterol 2.5 Mg/Ipratropium 0.5 Mg))  1 neb INH 

Q4H PRN


   PRN Reason: SOB/WHEEZING


Aspirin (Aspirin 81 Mg Chew Tab*)  81 mg PO DAILY [facility]


   Last Admin: 10/09/18 08:19 Dose:  81 mg


Docusate Sodium (Colace Cap*)  100 mg PO BID PRN


   PRN Reason: CONSTIPATION


Fentanyl Citrate (Fentanyl*)  50 mcg IV SLOW PU Q2HR PRN


   PRN Reason: PAIN


   Last Admin: 10/08/18 21:10 Dose:  50 mcg


Finasteride (Proscar Tab*)  5 mg PO DAILY [facility]


   Last Admin: 10/09/18 08:19 Dose:  5 mg


Ampicillin Sodium 1 gm/ Sodium (Chloride)  50 mls @ 200 mls/hr IVPB Q6H [facility]


   Stop: 10/12/18 23:59


   Last Admin: 10/09/18 13:01 Dose:  200 mls/hr


Mometasone Furoate/Formoterol Fumar (Dulera 200/5 Mdi*)  2 puff INH BID [facility]


   Last Admin: 10/09/18 08:11 Dose:  2 puff


Omeprazole (Prilosec Cap*)  20 mg PO DAILY@0600 [facility]


   Last Admin: 10/09/18 05:11 Dose:  20 mg


Ondansetron HCl (Zofran Inj*)  4 mg IV Q4H PRN


   PRN Reason: NAUSEA/VOMITING


Pharmacy Profile Note (Coumadin Per Pharmacy*)  1 note FOLLOW UP .PER PHARMACY 

PROTOC [facility]; Protocol


Senna (Senokot Tab*)  1 tab PO BID PRN


   PRN Reason: CONSTIPATION


Tamsulosin HCl (Flomax Cap*)  0.4 mg PO BEDTIME [facility]


   Last Admin: 10/08/18 21:10 Dose:  0.4 mg


Warfarin Sodium (Coumadin Tab(*))  1 mg PO 1700 [facility]


   Stop: 10/09/18 17:01








 Vital Signs - 8 hr











  10/09/18





  11:13


 


Temperature 98.3 F


 


Pulse Rate 72


 


Respiratory 26





Rate 


 


Blood Pressure 144/61





(mmHg) 


 


O2 Sat by Pulse 96





Oximetry 











Oxygen Devices in Use Now: Nasal Cannula


Appearance: 68 super morbid obese male A+O x3 in NAD


Eyes: No Scleral Icterus, PERRLA


Ears/Nose/Mouth/Throat: NL Teeth, Lips, Gums, Mucous Membranes Moist


Neck: NL Appearance and Movements; NL JVP


Respiratory: Symmetrical Chest Expansion and Respiratory Effort, Clear to 

Auscultation


Cardiovascular: NL Sounds; No Murmurs; No JVD, RRR


Abdominal: NL Sounds; No Tenderness; No Distention, - - morbid obese - diffuclt 

to asculate BS


Neurological: Alert and Oriented x 3, - - right arm/leg weaker than right


Lines/Tubes/Other Access: Clean, Dry and Intact Peripheral IV


Nutrition: Taking PO's


Result Diagrams: 


 10/09/18 05:21





 10/09/18 05:21


Microbiology and Other Data: 


 Microbiology











 10/04/18 10:05 Aerobic Blood Culture - Preliminary





 Blood Venous    No Growth Day 4





 Anaerobic Blood Culture - Preliminary





    No Growth Day 4


 


 10/04/18 10:00 Aerobic Blood Culture - Preliminary





 Blood Venous    No Growth Day 4





 Anaerobic Blood Culture - Preliminary





    No Growth Day 4


 


 10/03/18 05:53 Aerobic Blood Culture - Final





 Blood Venous    Not Reportable





 Anaerobic Blood Culture - Final





    Not Reportable





 Blood Culture - Final





    No Growth Day 5


 


 10/03/18 10:05 Gram Stain - Final





 Sputum Sputum Culture - Final





    Normal Enedelia


 


 10/03/18 01:37 Urine Culture - Final





 Urine    Proteus Mirabilis





    Enterococcus Faecalis


 


 10/03/18 00:10 Nasal Screen MRSA (PCR) - Final





 Nasal    Mrsa Not Detected














Assess/Plan/Problems-Billing


Assessment: 





Patient is a 67yo male with a PMH for CKD, CVA, morbid obesity, COPD, Afib, 

urinary retention, CHF, Obesity-Hypoventilation who was admitted for acute 

hypercarbic respiratory failure with intubation and consolidation of the lung 

along with UTI who is improving slowly on antibiotics and has been transferred 

out of the ICU. 





- Patient Problems


(1) Acute respiratory failure with hypoxia and hypercarbia


Comment: 


- Improving. Severely increase pCO2 on admission


- Possible pneumonia with infiltrate on CXR


- Restrictive and obstructive pattern of respiratory failure noted likely due 

to obesity hypoventilation syndrome and COPD   





(2) Urinary tract infection with hematuria


Comment: 


- Recurrent, Enterococcus and Proteus


- Continue Ampicillin for this and Possible pneumonia 


- alexandre changed   





(3) Hemiparesis of right dominant side as late effect of cerebral infarction


Comment: 


- Stable consistent with previous fluctuating deficits


- Likely recrudescence of prior CVA in setting of UTI


- Continue Aspirin and Warfarin    





(4) Afib


Comment: 


- Pacemaker for tachy-olinda syndrome.


- History of cardioembolic stroke, 2016.


- Continue coumdin, therapeutic INR.    





(5) BPH (benign prostatic hyperplasia)


Comment: 


- Continue tamsulosin


- Chronic alexandre for urinary retention


   





(6) CKD (chronic kidney disease) stage 4, GFR 15-29 ml/min


Comment: 


- At baseline   





(7) KIM (obstructive sleep apnea)


Comment: 


- With chronic hypoxic/hypercarbic respiratory failure, on 3L O2 routinely.


- With obesity hypoventilation syndrome.


- Continue CPAP   





(8) Obesity hypoventilation syndrome


Comment: 


- Restrictive lung disease with chronic hypercarbia


- CPAP   





(9) Full code status


Comment: 


   





(10) DVT prophylaxis


Comment: 


- Warfarin, INR therapeutic   


Status and Disposition: 





Inpatient, [facility] at discharge

## 2018-10-10 LAB
BASOPHILS # BLD AUTO: 0.1 10^3/UL (ref 0–0.2)
EOSINOPHIL # BLD AUTO: 0.2 10^3/UL (ref 0–0.6)
HCT VFR BLD AUTO: 32 % (ref 42–52)
HGB BLD-MCNC: 10.1 G/DL (ref 14–18)
INR PPP/BLD: 1.78 (ref 0.77–1.02)
LYMPHOCYTES # BLD AUTO: 0.8 10^3/UL (ref 1–4.8)
MCH RBC QN AUTO: 25 PG (ref 27–31)
MCHC RBC AUTO-ENTMCNC: 31 G/DL (ref 31–36)
MCV RBC AUTO: 80 FL (ref 80–94)
MONOCYTES # BLD AUTO: 0.3 10^3/UL (ref 0–0.8)
NEUTROPHILS # BLD AUTO: 6.2 10^3/UL (ref 1.5–7.7)
NRBC # BLD AUTO: 0 10^3/UL
NRBC BLD QL AUTO: 0.1
PLATELET # BLD AUTO: 201 10^3/UL (ref 150–450)
RBC # BLD AUTO: 4.05 10^6/UL (ref 4–5.4)
WBC # BLD AUTO: 7.6 10^3/UL (ref 3.5–10.8)

## 2018-10-10 RX ADMIN — OMEPRAZOLE SCH MG: 20 CAPSULE, DELAYED RELEASE ORAL at 07:18

## 2018-10-10 RX ADMIN — FINASTERIDE SCH MG: 5 TABLET, FILM COATED ORAL at 07:52

## 2018-10-10 RX ADMIN — AMPICILLIN SODIUM SCH MLS/HR: 1 INJECTION, POWDER, FOR SOLUTION INTRAVENOUS at 14:59

## 2018-10-10 RX ADMIN — ASPIRIN SCH MG: 81 TABLET, CHEWABLE ORAL at 07:52

## 2018-10-10 RX ADMIN — MOMETASONE FUROATE AND FORMOTEROL FUMARATE DIHYDRATE SCH PUFF: 200; 5 AEROSOL RESPIRATORY (INHALATION) at 08:30

## 2018-10-10 RX ADMIN — AMPICILLIN SODIUM SCH MLS/HR: 1 INJECTION, POWDER, FOR SOLUTION INTRAVENOUS at 19:22

## 2018-10-10 RX ADMIN — AMPICILLIN SODIUM SCH MLS/HR: 1 INJECTION, POWDER, FOR SOLUTION INTRAVENOUS at 07:52

## 2018-10-10 RX ADMIN — AMPICILLIN SODIUM SCH MLS/HR: 1 INJECTION, POWDER, FOR SOLUTION INTRAVENOUS at 01:30

## 2018-10-10 RX ADMIN — FENTANYL CITRATE PRN MCG: 0.05 INJECTION, SOLUTION INTRAMUSCULAR; INTRAVENOUS at 02:10

## 2018-10-10 RX ADMIN — TAMSULOSIN HYDROCHLORIDE SCH MG: 0.4 CAPSULE ORAL at 21:19

## 2018-10-10 RX ADMIN — MOMETASONE FUROATE AND FORMOTEROL FUMARATE DIHYDRATE SCH PUFF: 200; 5 AEROSOL RESPIRATORY (INHALATION) at 20:19

## 2018-10-10 RX ADMIN — ACETAMINOPHEN PRN MG: 325 TABLET ORAL at 21:20

## 2018-10-10 NOTE — RAD
HISTORY: left foot paralysis - concern for CVA



COMPARISONS: July 29, 2018 



TECHNIQUE: Multiple contiguous axial CT scans were obtained of the head without 

intravenous contrast. 



FINDINGS: Evaluation is limited by positioning and patient motion

.

HEMORRHAGE/INFARCT: There is no hemorrhage or acute infarct.

MASSES/SHIFT: There is no mass or shift.



EXTRA-AXIAL SPACES: There are no extra-axial fluid collections.

SULCI AND VENTRICLES: There is diffuse and proportional enlargement of the sulci and

ventricles.



CEREBRUM: There are no focal parenchymal abnormalities.

BRAINSTEM: There are no focal parenchymal abnormalities.

CEREBELLUM: There are no focal parenchymal abnormalities.



VESSELS: The vessels are grossly normal.

PARANASAL SINUSES: The paranasal sinuses are clear.

ORBITS: The orbits are unremarkable.

BONES AND SOFT TISSUE: No bone or soft tissue abnormalities are noted.



OTHER: None



IMPRESSION: 

NO ACUTE INTRACRANIAL PATHOLOGY.

## 2018-10-10 NOTE — CONS
CONSULTATION REPORT:

 

DATE OF CONSULT:  10/10/18

 

PATIENT OF:  Dr. Donna Conner.

 

PRIMARY CARE PROVIDER:  [facility].

 

HISTORY OF PRESENT ILLNESS:  This is a 68-year-old man who I am asked to 
evaluate for possible stroke.  He presented on 10/02/18 with respiratory 
distress and altered mental status and was in the ICU for several days.  He 
most recently has been doing better.  He has noted some numbness in his left 
foot in the past a day or so with some shooting pain that began recently.  Today
, for the first time, he noticed that he could not move his left foot either to 
plantarflex or dorsiflex. He thinks that this is new as of at least yesterday 
afternoon and morning, but he is not sure he does not walk and he is morbidly 
obese and so he does not have the cause to move his foot often.  Of note, he 
has had a prior stroke that is left him with a right hemiparesis, which has 
been a static issue for him.

 

He has had some back pain as well, which is chronic, it is unclear whether it 
is actually worse or not, he was not sure.  He has a history of chronic atrial 
fibrillation, on anticoagulation; urinary retention; indwelling [place]; morbid 
obesity; cardiomegaly; chronic congestive heart failure; constipation; 
dysarthria; chronic kidney disease; hemiplegia secondary to stroke; COPD; 
history of MRSA; hypertension; BPH; history of VRE bacteremia.  He has a 
history of obstructive sleep apnea, on BiPAP.  He has pacemaker and ICD 
placement.

 

MEDICATIONS:  Currently are:

1.  Tylenol p.r.n.

2.  DuoNeb p.r.n. wheezing.

3.  Ampicillin 1 g q.6 hours.

4.  Aspirin 81 mg daily.

5.  Fentanyl p.r.n. pain.

6.  Proscar 5 mg daily.

7.  Dulera 2 puffs b.i.d.

8.  Prilosec 20 mg daily.

9.  Zofran 4 mg p.r.n. nausea.

10.  Coumadin 2.5 daily.

 

ALLERGIES:  He has no known drug allergies.

 

FAMILY HISTORY:  His mother  of colon cancer.  Father  of lung cancer.

 

SOCIAL HISTORY:  He has no history of alcohol abuse.  He has never been 
. He is bedbound.

 

REVIEW OF SYSTEMS:  Negative in all 14 spheres other than HPI.

 

PHYSICAL EXAM:  Temperature 98, pulse 72, respiratory rate 22, blood pressure 95
/45.  He is alert and oriented x3.  He had some mild dysarthria, but he says 
this is chronic.  He has 5-/5 strength on his right side.  His left side is 
strong other than he had left 0/5 plantarflexion and dorsiflexion of that leg 
and numbness from his ankle down down.  Reflexes were trace and equal.  Toes 
were mute to downgoing on the left, upgoing on the right. Chest:  Clear.  
Cardiovascular:  Regular rate and rhythm.  He is morbidly obese.

 

DIAGNOSTIC STUDIES/LAB DATA:  His CT scan I reviewed has not been read 
officially yet, but I reviewed and this is of poor quality, but no acute bleed 
or other changes were noted.

 

Labs include normal CBC other than crit of 32, platelets of 201.  INR was 1.78. 
Normal BNP other than BUN of 51, creatinine 2.56, glucose 116.  Vitamin D is 16.

 

IMPRESSION AND PLAN:  Puneet is hard to evaluate.  It is possible with this new 
finding of left weakness in plantar-flexion and dorsiflexion could be a stroke 
or it could be more than 1 lumbar nerve root or lumbar stenosis.  We have 
limited access and we cannot get an MRI scan of his brain because of his weight 
and his pacemaker. Similarly, we cannot get an MRI scan of his lumbar spine to 
see if there is significant pinched nerve there.  In terms of therapeutic 
options, he is on anticoagulation.  He is not a t-PA candidate and he is well 
outside the t-PA window or even a clot retrieval window since his last known 
well for his left foot was sometime yesterday.  I do not want to do a CTA given 
his kidney function.  He is unlikely to be a candidate for clot retrieval given 
his timeframe, but we are checking carotid Doppler now.  He should have 
physical therapy for the foot.  We could get a CT of his lumbar spine to see if 
we can see disk disease.  With his weight, it is likely he will have findings, 
but it is likely that even if he found something, we would not be sure what is 
the cause of this foot drop is and operation has more risks than benefits for 
him.  If the weakness persists, this is something that we can do.  Further his 
INR is a little lower than it had been and I will discuss this with 

Dr. Conner.  The other issue is his blood pressure and he has been a little on 
the low side.  If he has had a stoke, we can liberalize his medicines and make 
sure his fluid status is optimized.

 

Thank you for sharing his case.

 

 [id number]/[facility] #: [id number]

NICOLAS

## 2018-10-10 NOTE — RAD
HISTORY: concern for CVA



COMPARISONS: November 20, 2015 



TECHNIQUE: Multiple transverse and longitudinal ultrasound images were obtained of the

carotid and vertebral arteries bilaterally, using grayscale, color Doppler, and spectral

Doppler imaging.



FINDINGS: 

The study is limited by patient body habitus.



Measurement of carotid stenosis is based on flow velocity parameters that correlate the

residual internal carotid artery diameter with [employer] Symptomatic Carotid

Endarterectomy Trial (NASCET)-based stenosis levels.



RIGHT:

Intima: There is mild diffuse intimal thickening.



Velocities:

Right internal carotid artery maximum peak systolic velocity: 98  cm/s

Right common carotid artery maximum peak systolic velocity: 80  cm/s

Right internal carotid artery/common carotid artery ratio: 1.4 



Waveforms: There is no spectral broadening.



Right Vertebral: The right vertebral artery flow is antegrade.



LEFT:

Intima: There is mild diffuse intimal thickening.



Velocities:

The left vertebral and internal carotid arteries were not visible secondary to patient

body habitus and position.



OTHER FINDINGS: None.



IMPRESSION: 

1.  NO RIGHT INTERNAL CAROTID ARTERY STENOSIS BY NASCET CRITERIA.

2.  THE LEFT INTERNAL CAROTID ARTERY AND VERTEBRAL ARTERY WERE NOT ABLE TO BE EVALUATED

SECONDARY 



CPT II Codes: 3100F

## 2018-10-10 NOTE — RAD
HISTORY: pneumonia



COMPARISONS: October 04, 2018 



VIEWS: 2 : frontal AP view of the chest at at 3:11 PM. The patient is obliqued to the

left.  The study is markedly technically limited.



FINDINGS:

LINES AND TUBES: A left-sided pacemaker is noted. A right internal jugular venous catheter

is noted. The tip position is likely of the superior vena cava but is indeterminate on the

given projections. 

CARDIOMEDIASTINAL SILHOUETTE: Enlarged 

PLEURA: There is a probable moderate left pleural effusion. 

LUNG PARENCHYMA: There is a diffuse reticular pattern with indistinct pulmonary vessels.

ABDOMEN: The upper abdomen is clear. There is no subphrenic gas.

BONES AND SOFT TISSUES: No bone or soft tissue abnormalities are noted.



IMPRESSION: 

1.  MARKEDLY LIMITED STUDY.

2.  CARDIOMEGALY.

3.  PULMONARY INTERSTITIAL EDEMA.

4.  PROBABLE LEFT PLEURAL EFFUSION.

## 2018-10-10 NOTE — PN
Subjective


Date of Service: 10/10/18


Interval History: 











Patient seen and evaluated at his bedside for complaint of right foot loss of 

feeling and is unable to flex or extend foot. He is not sure if this started 

this morning or possibly yesterday or before, he reports "I am not totally 

clear if this was a problem after I woke up from being intubated but I dont 

think so". He states he was having increased neuropathy "zaps" in his right 

foot the last several days which now he hasnt experienced today and now he has 

"no feeling in my foot". Prior to hospitalization he had full ROM and sensation 

in his foot. He reports R sided weakness is slightly weaker than his baseline 

prior to hospitalization. Denies HA, speech or swallowing changes. He denies 

back pain or sacral/gluteal pain 

















Family History: Unchanged from Admission


Social History: Unchanged from Admission


Past Medical History: Unchanged from Admission





Objective


Active Medications: 








Acetaminophen (Tylenol Tab*)  650 mg PO Q4H PRN


   PRN Reason: FEVER/PAIN


Albuterol/Ipratropium (Duoneb (Albuterol 2.5 Mg/Ipratropium 0.5 Mg))  1 neb INH 

Q4H PRN


   PRN Reason: SOB/WHEEZING


Aspirin (Aspirin 81 Mg Chew Tab*)  81 mg PO DAILY [facility]


   Last Admin: 10/10/18 07:52 Dose:  81 mg


Docusate Sodium (Colace Cap*)  100 mg PO BID PRN


   PRN Reason: CONSTIPATION


Fentanyl Citrate (Fentanyl*)  50 mcg IV SLOW PU Q2HR PRN


   PRN Reason: PAIN


   Last Admin: 10/10/18 02:10 Dose:  50 mcg


Finasteride (Proscar Tab*)  5 mg PO DAILY [facility]


   Last Admin: 10/10/18 07:52 Dose:  5 mg


Ampicillin Sodium 1 gm/ Sodium (Chloride)  50 mls @ 200 mls/hr IVPB Q6H [facility]


   Stop: 10/12/18 23:59


   Last Admin: 10/10/18 07:52 Dose:  200 mls/hr


Mometasone Furoate/Formoterol Fumar (Dulera 200/5 Mdi*)  2 puff INH BID [facility]


   Last Admin: 10/10/18 08:30 Dose:  2 puff


Omeprazole (Prilosec Cap*)  20 mg PO DAILY@0600 [facility]


   Last Admin: 10/10/18 07:18 Dose:  20 mg


Ondansetron HCl (Zofran Inj*)  4 mg IV Q4H PRN


   PRN Reason: NAUSEA/VOMITING


Pharmacy Profile Note (Coumadin Per Pharmacy*)  1 note FOLLOW UP .PER PHARMACY 

PROTOC [facility]; Protocol


Senna (Senokot Tab*)  1 tab PO BID PRN


   PRN Reason: CONSTIPATION


Tamsulosin HCl (Flomax Cap*)  0.4 mg PO BEDTIME [facility]


   Last Admin: 10/09/18 22:24 Dose:  0.4 mg


Warfarin Sodium (Coumadin Tab(*))  2.5 mg PO ONCE ONE


   Stop: 10/10/18 17:01








 Vital Signs - 8 hr











  10/10/18 10/10/18 10/10/18





  07:31 08:00 08:32


 


Temperature 98.0 F  


 


Pulse Rate 69  72


 


Respiratory 22 20 22





Rate   


 


Blood Pressure 95/45  





(mmHg)   


 


O2 Sat by Pulse 94  97





Oximetry   











Oxygen Devices in Use Now: Nasal Cannula


Appearance: super morbid obese male laying in bed in NAD, A+O x3


Eyes: No Scleral Icterus, PERRLA


Ears/Nose/Mouth/Throat: - - dry MM


Respiratory: Symmetrical Chest Expansion and Respiratory Effort, Clear to 

Auscultation


Cardiovascular: NL Sounds; No Murmurs; No JVD, RRR, No Edema


Abdominal: NL Sounds; No Tenderness; No Distention, - - obese - unable to 

ascultate BS 2nd to obesity - no tenderness on exam


Neurological: Alert and Oriented x 3, - - right hemipareis -  R weaker 

than L. Right foot weakness noted - is able to dorsi flex and plantar flex - 

note dto be weak. Left foot cannot dorsi flex or plantar flex - he can move hir 

right leg using hip/knee. No sensation just above ankle through foot - no 

sensation to pressure or light touch. speech is garbled which is baseline. 


Lines/Tubes/Other Access: Clean, Dry and Intact Peripheral IV


Nutrition: Taking PO's


Result Diagrams: 


 10/10/18 06:30





 10/10/18 06:30


Microbiology and Other Data: 


 Microbiology











 10/04/18 10:05 Aerobic Blood Culture - Preliminary





 Blood Venous    No Growth Day 4





 Anaerobic Blood Culture - Preliminary





    No Growth Day 4


 


 10/04/18 10:00 Aerobic Blood Culture - Preliminary





 Blood Venous    No Growth Day 4





 Anaerobic Blood Culture - Preliminary





    No Growth Day 4


 


 10/03/18 05:53 Aerobic Blood Culture - Final





 Blood Venous    Not Reportable





 Anaerobic Blood Culture - Final





    Not Reportable





 Blood Culture - Final





    No Growth Day 5


 


 10/03/18 10:05 Gram Stain - Final





 Sputum Sputum Culture - Final





    Normal Enedelia


 


 10/03/18 01:37 Urine Culture - Final





 Urine    Proteus Mirabilis





    Enterococcus Faecalis


 


 10/03/18 00:10 Nasal Screen MRSA (PCR) - Final





 Nasal    Mrsa Not Detected














Assess/Plan/Problems-Billing


Assessment: 





Patient is a 67yo male with a PMH for CKD, CVA, morbid obesity, COPD, Afib, 

urinary retention, CHF, Obesity-Hypoventilation who was admitted for acute 

hypercarbic respiratory failure with intubation and consolidation of the lung 

along with UTI who is improving slowly on antibiotics and has been transferred 

out of the ICU. 





- Patient Problems


(1) Paresthesia of left foot


Comment: 


- new onset? difficult to know when he developed this as he is not able to 

report exact time. Appreciate neurology consult. Concern for CVA with his 

inability to not be able to plantar or dorsi flex. Extremity is warm and well 

perfused with + pulses. 


-CT brain negative. Carotid us pending. Pt cannot undergo an MRI due to weight. 


check fasting lipids in am and start statin if indicated. Neuro checks.  


Discussed with neurology low SBP 90's - the patient appears to be trending low. 

He appears dry on exam. Currently his torsemide and spironlactone is being held 

- plan to repeat Chest xray - he may need gentle IVFs but will check xray first 

d/t hx of CHF   





(2) Acute respiratory failure with hypoxia and hypercarbia


Comment: 


- Improving. Severely increase pCO2 on admission


- Possible pneumonia with infiltrate on CXR - continue Ampicillin for 7 day 

course


- Restrictive and obstructive pattern of respiratory failure noted likely due 

to obesity hypoventilation syndrome and COPD   





(3) Urinary tract infection with hematuria


Comment: 


- Recurrent, Enterococcus and Proteus


- Continue Ampicillin for this and Possible pneumonia 


- alexandre changed   





(4) Hemiparesis of right dominant side as late effect of cerebral infarction


Comment: 


- Stable consistent with previous fluctuating deficits


- Likely recrudescence of prior CVA in setting of illness


- Continue Aspirin and Warfarin    





(5) Afib


Comment: 


- Pacemaker for tachy-olinda syndrome.


- History of cardioembolic stroke, 2016.


- Continue coumdin, therapeutic INR.    





(6) BPH (benign prostatic hyperplasia)


Comment: 


- Continue tamsulosin


- Chronic alexandre for urinary retention


   





(7) CKD (chronic kidney disease) stage 4, GFR 15-29 ml/min


Comment: 


- At baseline   





(8) KIM (obstructive sleep apnea)


Comment: 


- With chronic hypoxic/hypercarbic respiratory failure, on 3L O2 routinely.


- With obesity hypoventilation syndrome.


- Continue CPAP   





(9) Obesity hypoventilation syndrome


Comment: 


- Restrictive lung disease with chronic hypercarbia


- CPAP   





(10) Full code status


Comment: 


   





(11) DVT prophylaxis


Comment: 


- Warfarin,    


Status and Disposition: 





Inpatient, [facility] at discharge

## 2018-10-11 LAB
BASOPHILS # BLD AUTO: 0.1 10^3/UL (ref 0–0.2)
EOSINOPHIL # BLD AUTO: 0.2 10^3/UL (ref 0–0.6)
HCT VFR BLD AUTO: 32 % (ref 42–52)
HGB BLD-MCNC: 10 G/DL (ref 14–18)
INR PPP/BLD: 1.79 (ref 0.77–1.02)
LYMPHOCYTES # BLD AUTO: 0.7 10^3/UL (ref 1–4.8)
MCH RBC QN AUTO: 25 PG (ref 27–31)
MCHC RBC AUTO-ENTMCNC: 32 G/DL (ref 31–36)
MCV RBC AUTO: 80 FL (ref 80–94)
MONOCYTES # BLD AUTO: 0.3 10^3/UL (ref 0–0.8)
NEUTROPHILS # BLD AUTO: 6 10^3/UL (ref 1.5–7.7)
NRBC # BLD AUTO: 0 10^3/UL
NRBC BLD QL AUTO: 0.1
PLATELET # BLD AUTO: 207 10^3/UL (ref 150–450)
RBC # BLD AUTO: 3.95 10^6/UL (ref 4–5.4)
WBC # BLD AUTO: 7.4 10^3/UL (ref 3.5–10.8)

## 2018-10-11 RX ADMIN — MOMETASONE FUROATE AND FORMOTEROL FUMARATE DIHYDRATE SCH PUFF: 200; 5 AEROSOL RESPIRATORY (INHALATION) at 19:44

## 2018-10-11 RX ADMIN — AMPICILLIN SODIUM SCH MLS/HR: 1 INJECTION, POWDER, FOR SOLUTION INTRAVENOUS at 06:21

## 2018-10-11 RX ADMIN — AMPICILLIN SODIUM SCH MLS/HR: 1 INJECTION, POWDER, FOR SOLUTION INTRAVENOUS at 01:12

## 2018-10-11 RX ADMIN — AMPICILLIN SODIUM SCH MLS/HR: 1 INJECTION, POWDER, FOR SOLUTION INTRAVENOUS at 19:25

## 2018-10-11 RX ADMIN — OMEPRAZOLE SCH MG: 20 CAPSULE, DELAYED RELEASE ORAL at 06:18

## 2018-10-11 RX ADMIN — ATORVASTATIN CALCIUM SCH MG: 20 TABLET, FILM COATED ORAL at 20:08

## 2018-10-11 RX ADMIN — ACETAMINOPHEN PRN MG: 325 TABLET ORAL at 02:50

## 2018-10-11 RX ADMIN — AMPICILLIN SODIUM SCH MLS/HR: 1 INJECTION, POWDER, FOR SOLUTION INTRAVENOUS at 13:19

## 2018-10-11 RX ADMIN — TAMSULOSIN HYDROCHLORIDE SCH MG: 0.4 CAPSULE ORAL at 20:08

## 2018-10-11 RX ADMIN — Medication SCH UNITS: at 09:33

## 2018-10-11 RX ADMIN — MOMETASONE FUROATE AND FORMOTEROL FUMARATE DIHYDRATE SCH PUFF: 200; 5 AEROSOL RESPIRATORY (INHALATION) at 07:42

## 2018-10-11 RX ADMIN — ACETAMINOPHEN PRN MG: 325 TABLET ORAL at 11:05

## 2018-10-11 RX ADMIN — ASPIRIN SCH MG: 81 TABLET, CHEWABLE ORAL at 09:33

## 2018-10-11 RX ADMIN — FINASTERIDE SCH MG: 5 TABLET, FILM COATED ORAL at 09:34

## 2018-10-11 NOTE — PN
Subjective


Date of Service: 10/11/18


Interval History: 











Patient reports his right foot is "still numb" - he reports he is experiencing 

tingling painful  sensation in his right foot. He still reports he can not flex 

or extend. He denies a lumbar CT. Denies any new symptoms. He reports that he 

feels that his breathing is improving. Denies cough/SOB. Denies fever/chills. 











Family History: Unchanged from Admission


Social History: Unchanged from Admission


Past Medical History: Unchanged from Admission





Objective


Active Medications: 








Acetaminophen (Tylenol Tab*)  650 mg PO Q4H PRN


   PRN Reason: FEVER/PAIN


   Last Admin: 10/11/18 11:05 Dose:  650 mg


Albuterol/Ipratropium (Duoneb (Albuterol 2.5 Mg/Ipratropium 0.5 Mg))  1 neb INH 

Q4H PRN


   PRN Reason: SOB/WHEEZING


Aspirin (Aspirin 81 Mg Chew Tab*)  81 mg PO DAILY [facility]


   Last Admin: 10/11/18 09:33 Dose:  81 mg


Atorvastatin Calcium (Lipitor*)  20 mg PO 2100 [facility]


Cholecalciferol (Vitamin D Tab*)  2,000 units PO DAILY [facility]


   Last Admin: 10/11/18 09:33 Dose:  2,000 units


Docusate Sodium (Colace Cap*)  100 mg PO BID PRN


   PRN Reason: CONSTIPATION


Finasteride (Proscar Tab*)  5 mg PO DAILY [facility]


   Last Admin: 10/11/18 09:34 Dose:  5 mg


Ampicillin Sodium 1 gm/ Sodium (Chloride)  50 mls @ 200 mls/hr IVPB Q6H [facility]


   Stop: 10/12/18 23:59


   Last Admin: 10/11/18 13:19 Dose:  200 mls/hr


Mometasone Furoate/Formoterol Fumar (Dulera 200/5 Mdi*)  2 puff INH BID [facility]


   Last Admin: 10/11/18 07:42 Dose:  2 puff


Omeprazole (Prilosec Cap*)  20 mg PO DAILY@0600 [facility]


   Last Admin: 10/11/18 06:18 Dose:  20 mg


Ondansetron HCl (Zofran Inj*)  4 mg IV Q4H PRN


   PRN Reason: NAUSEA/VOMITING


Pharmacy Profile Note (Coumadin Per Pharmacy*)  1 note FOLLOW UP .PER PHARMACY 

PROTOC [facility]; Protocol


Senna (Senokot Tab*)  1 tab PO BID PRN


   PRN Reason: CONSTIPATION


Tamsulosin HCl (Flomax Cap*)  0.4 mg PO BEDTIME [facility]


   Last Admin: 10/10/18 21:19 Dose:  0.4 mg








 Vital Signs - 8 hr











  10/11/18 10/11/18 10/11/18





  11:15 13:11 14:49


 


Temperature 98.4 F  99.0 F


 


Pulse Rate 69  69


 


Respiratory 24 17 22





Rate   


 


Blood Pressure 129/62  112/54





(mmHg)   


 


O2 Sat by Pulse 91  94





Oximetry   











Oxygen Devices in Use Now: Nasal Cannula


Appearance: super morbid obese male A+O x3 in NAD


Eyes: No Scleral Icterus, PERRLA


Ears/Nose/Mouth/Throat: NL Teeth, Lips, Gums, Mucous Membranes Moist


Neck: NL Appearance and Movements; NL JVP


Respiratory: Symmetrical Chest Expansion and Respiratory Effort, Clear to 

Auscultation


Cardiovascular: NL Sounds; No Murmurs; No JVD, RRR, No Edema


Abdominal: NL Sounds; No Tenderness; No Distention


Extremities: No Edema, No Clubbing, Cyanosis


Skin: No Rash or Ulcers, No Nodules or Sclerosis


Neurological: Alert and Oriented x 3, - - right hemiparesis. Left foot 

parasthesia - unable to dorsi or plantar flex , warm to touch


Lines/Tubes/Other Access: Clean, Dry and Intact Peripheral IV


Nutrition: Taking PO's


Result Diagrams: 


 10/11/18 05:05





 10/11/18 05:05


Microbiology and Other Data: 


 Microbiology











 10/04/18 10:05 Aerobic Blood Culture - Preliminary





 Blood Venous    No Growth Day 4





 Anaerobic Blood Culture - Preliminary





    No Growth Day 4


 


 10/04/18 10:00 Aerobic Blood Culture - Preliminary





 Blood Venous    No Growth Day 4





 Anaerobic Blood Culture - Preliminary





    No Growth Day 4


 


 10/03/18 05:53 Aerobic Blood Culture - Final





 Blood Venous    Not Reportable





 Anaerobic Blood Culture - Final





    Not Reportable





 Blood Culture - Final





    No Growth Day 5


 


 10/03/18 10:05 Gram Stain - Final





 Sputum Sputum Culture - Final





    Normal Enedelia


 


 10/03/18 01:37 Urine Culture - Final





 Urine    Proteus Mirabilis





    Enterococcus Faecalis


 


 10/03/18 00:10 Nasal Screen MRSA (PCR) - Final





 Nasal    Mrsa Not Detected














Assess/Plan/Problems-Billing


Assessment: 





Patient is a 69yo male with a PMH for CKD, CVA, morbid obesity, COPD, Afib, 

urinary retention, CHF, Obesity-Hypoventilation who was admitted for acute 

hypercarbic respiratory failure with intubation and consolidation of the lung 

along with UTI who is improving slowly on antibiotics and has been transferred 

out of the ICU. 





- Patient Problems


(1) Paresthesia of left foot


Comment: 


- Appreciate neurology consult - suspect possible CVA. Pt cannot under go MRI d/

t weight.  Lipid profile LDL 90 - recommends starting atorvastatin. Repeat CT 

brain in am. Continue ASA, coumadin 


- Differential is pinch nerve? He is not having any back pain. Patient does not 

want a lumbar CT and is refusing at this time   





(2) Acute respiratory failure with hypoxia and hypercarbia


Comment: 


- Improving. Severely increase pCO2 on admission


- Possible pneumonia with infiltrate on CXR - continue Ampicillin for 7 day 

course - last day 10/12


- Restrictive and obstructive pattern of respiratory failure noted likely due 

to obesity hypoventilation syndrome and COPD


- continue CPAP   





(3) Urinary tract infection with hematuria


Comment: 


- Recurrent, Enterococcus and Proteus


- Continue Ampicillin for this and Possible pneumonia 


- alexandre changed   





(4) Hemiparesis of right dominant side as late effect of cerebral infarction


Comment: 


- Stable consistent with previous fluctuating deficits


- Likely recrudescence of prior CVA in setting of illness


- Continue Aspirin and Warfarin    





(5) Afib


Comment: 


- Pacemaker for tachy-olinda syndrome.


- History of cardioembolic stroke, 2016.


- Continue coumdin, therapeutic INR.    





(6) BPH (benign prostatic hyperplasia)


Comment: 


- Continue tamsulosin


- Chronic alexandre for urinary retention


   





(7) CKD (chronic kidney disease) stage 4, GFR 15-29 ml/min


Comment: 


- At baseline   





(8) KIM (obstructive sleep apnea)


Comment: 


- With chronic hypoxic/hypercarbic respiratory failure, on 3L O2 routinely.


- With obesity hypoventilation syndrome.


- Continue CPAP   





(9) Obesity hypoventilation syndrome


Comment: 


- Restrictive lung disease with chronic hypercarbia


- CPAP   





(10) Full code status


Comment: 


   





(11) DVT prophylaxis


Comment: 


- Warfarin,    


Status and Disposition: 





Inpatient, [facility] at discharge

## 2018-10-12 LAB
BASOPHILS # BLD AUTO: 0.1 10^3/UL (ref 0–0.2)
EOSINOPHIL # BLD AUTO: 0.2 10^3/UL (ref 0–0.6)
HCT VFR BLD AUTO: 32 % (ref 42–52)
HGB BLD-MCNC: 10.3 G/DL (ref 14–18)
INR PPP/BLD: 1.49 (ref 0.77–1.02)
LYMPHOCYTES # BLD AUTO: 0.6 10^3/UL (ref 1–4.8)
MCH RBC QN AUTO: 25 PG (ref 27–31)
MCHC RBC AUTO-ENTMCNC: 32 G/DL (ref 31–36)
MCV RBC AUTO: 80 FL (ref 80–94)
MONOCYTES # BLD AUTO: 0.5 10^3/UL (ref 0–0.8)
NEUTROPHILS # BLD AUTO: 7.6 10^3/UL (ref 1.5–7.7)
NRBC # BLD AUTO: 0 10^3/UL
NRBC BLD QL AUTO: 0.1
PLATELET # BLD AUTO: 226 10^3/UL (ref 150–450)
RBC # BLD AUTO: 4.04 10^6/UL (ref 4–5.4)
WBC # BLD AUTO: 9 10^3/UL (ref 3.5–10.8)

## 2018-10-12 RX ADMIN — AMPICILLIN SODIUM SCH MLS/HR: 1 INJECTION, POWDER, FOR SOLUTION INTRAVENOUS at 20:16

## 2018-10-12 RX ADMIN — TRAMADOL HYDROCHLORIDE PRN MG: 50 TABLET, FILM COATED ORAL at 16:15

## 2018-10-12 RX ADMIN — MOMETASONE FUROATE AND FORMOTEROL FUMARATE DIHYDRATE SCH PUFF: 200; 5 AEROSOL RESPIRATORY (INHALATION) at 19:41

## 2018-10-12 RX ADMIN — ATORVASTATIN CALCIUM SCH MG: 20 TABLET, FILM COATED ORAL at 20:18

## 2018-10-12 RX ADMIN — AMPICILLIN SODIUM SCH MLS/HR: 1 INJECTION, POWDER, FOR SOLUTION INTRAVENOUS at 12:12

## 2018-10-12 RX ADMIN — AMPICILLIN SODIUM SCH MLS/HR: 1 INJECTION, POWDER, FOR SOLUTION INTRAVENOUS at 02:13

## 2018-10-12 RX ADMIN — ASPIRIN SCH MG: 81 TABLET, CHEWABLE ORAL at 08:11

## 2018-10-12 RX ADMIN — MOMETASONE FUROATE AND FORMOTEROL FUMARATE DIHYDRATE SCH PUFF: 200; 5 AEROSOL RESPIRATORY (INHALATION) at 07:43

## 2018-10-12 RX ADMIN — Medication SCH UNITS: at 08:11

## 2018-10-12 RX ADMIN — OMEPRAZOLE SCH MG: 20 CAPSULE, DELAYED RELEASE ORAL at 05:31

## 2018-10-12 RX ADMIN — TAMSULOSIN HYDROCHLORIDE SCH MG: 0.4 CAPSULE ORAL at 20:18

## 2018-10-12 RX ADMIN — FINASTERIDE SCH MG: 5 TABLET, FILM COATED ORAL at 08:11

## 2018-10-12 RX ADMIN — TRAMADOL HYDROCHLORIDE PRN MG: 50 TABLET, FILM COATED ORAL at 04:38

## 2018-10-12 RX ADMIN — AMPICILLIN SODIUM SCH MLS/HR: 1 INJECTION, POWDER, FOR SOLUTION INTRAVENOUS at 08:10

## 2018-10-12 NOTE — PN
Subjective


Date of Service: 10/12/18


Interval History: 








Patient continues to have right foot numbness w/o feeling and or the ability to 

dorsi or plantar flex. he reports he continues to have sensations of "shooting 

pins and needles" in his R foot. 





He denies SOB. No cough. He feels that his breathing has much improved. Denies 

fever or chills











Family History: Unchanged from Admission


Social History: Unchanged from Admission


Past Medical History: Unchanged from Admission





Objective


Active Medications: 








Acetaminophen (Tylenol Tab*)  650 mg PO Q4H PRN


   PRN Reason: FEVER/PAIN


   Last Admin: 10/11/18 11:05 Dose:  650 mg


Albuterol/Ipratropium (Duoneb (Albuterol 2.5 Mg/Ipratropium 0.5 Mg))  1 neb INH 

Q4H PRN


   PRN Reason: SOB/WHEEZING


Aspirin (Aspirin 81 Mg Chew Tab*)  81 mg PO DAILY [facility]


   Last Admin: 10/12/18 08:11 Dose:  81 mg


Atorvastatin Calcium (Lipitor*)  20 mg PO 2100 [facility]


   Last Admin: 10/11/18 20:08 Dose:  20 mg


Cholecalciferol (Vitamin D Tab*)  2,000 units PO DAILY [facility]


   Last Admin: 10/12/18 08:11 Dose:  2,000 units


Docusate Sodium (Colace Cap*)  100 mg PO BID PRN


   PRN Reason: CONSTIPATION


Finasteride (Proscar Tab*)  5 mg PO DAILY [facility]


   Last Admin: 10/12/18 08:11 Dose:  5 mg


Ampicillin Sodium 1 gm/ Sodium (Chloride)  50 mls @ 200 mls/hr IVPB Q6H [facility]


   Stop: 10/12/18 23:59


   Last Admin: 10/12/18 12:12 Dose:  200 mls/hr


Mometasone Furoate/Formoterol Fumar (Dulera 200/5 Mdi*)  2 puff INH BID [facility]


   Last Admin: 10/12/18 07:43 Dose:  2 puff


Omeprazole (Prilosec Cap*)  20 mg PO DAILY@0600 [facility]


   Last Admin: 10/12/18 05:31 Dose:  20 mg


Ondansetron HCl (Zofran Inj*)  4 mg IV Q4H PRN


   PRN Reason: NAUSEA/VOMITING


Pharmacy Profile Note (Coumadin Per Pharmacy*)  1 note FOLLOW UP .PER PHARMACY 

PROTOC [facility]; Protocol


Senna (Senokot Tab*)  1 tab PO BID PRN


   PRN Reason: CONSTIPATION


Tamsulosin HCl (Flomax Cap*)  0.4 mg PO BEDTIME [facility]


   Last Admin: 10/11/18 20:08 Dose:  0.4 mg


Tramadol HCl (Ultram*)  50 mg PO Q12H PRN


   PRN Reason: PAIN


   Last Admin: 10/12/18 04:38 Dose:  50 mg


Warfarin Sodium (Coumadin Tab(*))  3 mg PO ONCE ONE


   Stop: 10/12/18 17:01








 Vital Signs - 8 hr











  10/12/18 10/12/18 10/12/18





  07:43 08:00 08:07


 


Temperature   97.5 F


 


Pulse Rate 72  72


 


Respiratory 20 18 20





Rate   


 


Blood Pressure   105/57





(mmHg)   


 


O2 Sat by Pulse 92  95





Oximetry   














  10/12/18 10/12/18





  08:08 11:30


 


Temperature  97.5 F


 


Pulse Rate  72


 


Respiratory 18 20





Rate  


 


Blood Pressure  110/50





(mmHg)  


 


O2 Sat by Pulse  99





Oximetry  











Oxygen Devices in Use Now: Nasal Cannula


Appearance: super morbid obese male A+O x3 in nad


Eyes: No Scleral Icterus, PERRLA


Ears/Nose/Mouth/Throat: NL Teeth, Lips, Gums, Mucous Membranes Moist


Neck: NL Appearance and Movements; NL JVP


Respiratory: Symmetrical Chest Expansion and Respiratory Effort


Cardiovascular: RRR, No Edema


Abdominal: - - obese - difficult to ascultate d/t obesity


Neurological: Alert and Oriented x 3


Lines/Tubes/Other Access: Clean, Dry and Intact Peripheral IV


Nutrition: Taking PO's


Result Diagrams: 


 10/12/18 06:55





 10/12/18 06:55


Microbiology and Other Data: 


 Microbiology











 10/04/18 10:05 Aerobic Blood Culture - Preliminary





 Blood Venous    No Growth Day 4





 Anaerobic Blood Culture - Preliminary





    No Growth Day 4


 


 10/04/18 10:00 Aerobic Blood Culture - Preliminary





 Blood Venous    No Growth Day 4





 Anaerobic Blood Culture - Preliminary





    No Growth Day 4


 


 10/03/18 05:53 Aerobic Blood Culture - Final





 Blood Venous    Not Reportable





 Anaerobic Blood Culture - Final





    Not Reportable





 Blood Culture - Final





    No Growth Day 5


 


 10/03/18 10:05 Gram Stain - Final





 Sputum Sputum Culture - Final





    Normal Enedelia


 


 10/03/18 01:37 Urine Culture - Final





 Urine    Proteus Mirabilis





    Enterococcus Faecalis


 


 10/03/18 00:10 Nasal Screen MRSA (PCR) - Final





 Nasal    Mrsa Not Detected














Assess/Plan/Problems-Billing


Assessment: 





Patient is a 69yo male with a PMH for CKD, CVA, morbid obesity, COPD, Afib, 

urinary retention, CHF, Obesity-Hypoventilation who was admitted for acute 

hypercarbic respiratory failure with intubation and consolidation of the lung 

along with UTI who is improving slowly on antibiotics and has been transferred 

out of the ICU. 





- Patient Problems


(1) Paresthesia of left foot


Comment: 


- Appreciate neurology consult - suspect possible CVA. Pt cannot under go MRI d/

t weight.  Lipid profile LDL 90 - recommends starting atorvastatin. Repeat CT 

brain in am. Continue ASA, coumadin 


- Differential is pinch nerve? He is not having any back pain. Patient does not 

want a lumbar CT and is refusing at this time   





(2) Acute respiratory failure with hypoxia and hypercarbia


Comment: 


- Improving. Severely increase pCO2 on admission


- Possible pneumonia with infiltrate on CXR - continue Ampicillin for 7 day 

course - last day 10/12


- Restrictive and obstructive pattern of respiratory failure noted likely due 

to obesity hypoventilation syndrome and COPD


- continue CPAP   





(3) Urinary tract infection with hematuria


Comment: 


- resolved   





(4) Hemiparesis of right dominant side as late effect of cerebral infarction


Comment: 


- Stable consistent with previous fluctuating deficits


- Likely recrudescence of prior CVA in setting of illness


- Continue Aspirin and Warfarin    





(5) Afib


Comment: 


- Pacemaker for tachy-olinda syndrome.


- History of cardioembolic stroke, 2016.


- Continue coumdin, therapeutic INR.    





(6) BPH (benign prostatic hyperplasia)


Comment: 


- Continue tamsulosin


- Chronic alexandre for urinary retention


   





(7) CKD (chronic kidney disease) stage 4, GFR 15-29 ml/min


Comment: 


- At baseline   





(8) KIM (obstructive sleep apnea)


Comment: 


- With chronic hypoxic/hypercarbic respiratory failure, on 3L O2 routinely.


- With obesity hypoventilation syndrome.


- Continue CPAP   





(9) Obesity hypoventilation syndrome


Comment: 


- Restrictive lung disease with chronic hypercarbia


- CPAP   





(10) Full code status


Comment: 


   





(11) DVT prophylaxis


Comment: 


- Warfarin,    


Status and Disposition: 





Inpatient, [facility] at discharge

## 2018-10-12 NOTE — RAD
HISTORY: hemiparesis



COMPARISONS: October 10, 2018 



TECHNIQUE: Multiple contiguous axial CT scans were obtained of the head without 

intravenous contrast. 



FINDINGS: The study is markedly limited by positioning and body habitus.





HEMORRHAGE/INFARCT: There is no hemorrhage or acute infarct.

MASSES/SHIFT: There is no mass or shift.



EXTRA-AXIAL SPACES: There are no extra-axial fluid collections.

SULCI AND VENTRICLES: The sulci and ventricles are normal in size and position for the

patient's stated age.



CEREBRUM: There are no focal parenchymal abnormalities.

BRAINSTEM: There are no focal parenchymal abnormalities.

CEREBELLUM: There are no focal parenchymal abnormalities.



VESSELS: The vessels are grossly normal.

PARANASAL SINUSES: The paranasal sinuses are clear.

ORBITS: The orbits are unremarkable.

BONES AND SOFT TISSUE: No bone or soft tissue abnormalities are noted.



OTHER: None



IMPRESSION: 

1.  LIMITED STUDY.

2.  WITHIN THE LIMITATIONS OF THE STUDY, THERE IS NO ACUTE INTRACRANIAL PATHOLOGY

## 2018-10-12 NOTE — ECHO
Patient:      YOLANDA WEAVER  

[facility] Rec#:     [id number]            :          1950          

Date:         10/12/2018            Age:          68y                 

Account#:     [id number]          Height:       183 cm / 72.0 in

Accession#:   [id number]           Weight:       249.2 kg / 549.2 lbs

Sex:          M                     BSA:          3.3

Room#:        [facility]                

Admit Date#:  10/02/2018          

Type:         Inpatient

 

Referring:    Abelardo Herr MD

Reading:      David Blevins MD

Sonographer:  Amanda Gloria RN RDCS

CC:           Qutaybeh Maghaydah, MD

______________________________________________________________________

 

Transthoracic Echocardiogram

 

Indication:

CVA

BP:           106/49

HR:           70

Rhythm:       NSR

 

Findings     

Technical Comments:

The study quality is poor.  The study is technically limited due to poor

acoustic windows.  Definity was used for the study on 2018 and did

not improve image quality. Apical windows were not obtainable today.  

 

Left Ventricle:

The left ventricle is not well visualized. The left ventricular chamber

size is mildly dilated. Moderate concentric left ventricular hypertrophy

is observed. Unable to estimate left ventricular ejection fraction.  

The assessment of diastolic function is non-diagnostic. 

 

Left Atrium:

The left atrium is not well visualized. 

 

Right Ventricle:

The right ventricle is not well visualized. 

 

Right Atrium:

The right atrium is not well visualized. 

 

Aortic Valve:

The aortic valve structure is not well visualized. 

 

Mitral Valve:

The mitral valve structure is not well visualized. 

 

Tricuspid Valve:

The tricuspid valve structure is not well visualized. 

 

Pulmonic Valve:

The pulmonic valve structure is not well visualized. There is no

evidence of pulmonic regurgitation. There is no pulmonic stenosis.  

 

Pericardium:

There is no significant pericardial effusion. 

 

Aorta:

The aorta is not well visualized. The ascending aorta is not well

visualized. The aortic arch is not well visualized.  There is moderate

dilatation of the aortic root. 

 

Pulmonary Artery:

The main pulmonary artery is not well visualized. 

 

Venous:

The venous system is not well visualized. The inferior vena cava is not

visualized. 

 

Conclusions

This echo study quality is poor and essentially inconclusive. 

There is moderate dilatation of the aortic root.

 

Compared to the prior echocardiogram completed 18, prior study

essentially inconclusive as well (despite use of echo enhancement

agent). Aortic root reported mildly dilated prior.

 

Measurements     

Name                    Value         Normal Range            

RVIDd (AP) 2D           4.2 cm        (0.9 - 2.6)             

IVSd (2D)               1.5 cm        (0.6 - 1)               

LVPWd (2D)              1.5 cm        (0.6 - 1)               

LVIDd (2D)              5.8 cm        (3.6 - 5.4)             

Aortic Annulus          2.9 cm        (1.4 - 2.6)             

Ao root diameter (2D)   4.4 cm        (2.1 - 3.5)             

 

Name                    Value         Normal Range            

MV E-wave Vmax          0.93 m/sec    -                        

MV deceleration time    229 msec      -                        

MV A-wave Vmax          0.53 m/sec    -                        

MV E:A ratio            1.8 ratio     -                        

 

Name                    Value         Normal Range            

PV Vmax                 0.74 m/sec    -                        

 

Electronically signed by: David Blevins MD on 10/12/2018 16:38:26

## 2018-10-12 NOTE — PN
PROGRESS NOTE:

 

DATE OF VISIT:  10/11/2018.

 

PATIENT OF:  Manju Conner NP

 

HISTORY:  Mr. Culver still notes left foot weakness.  He occasionally has 
shooting pains in that left foot but he has no other complaints relevant to his 
left-sided symptoms.  He is on his aspirin 81 mg a day as well as his inhalers, 
ampicillin, docusate, Proscar, Dulera, Prilosec, Flomax 0.4 mg daily and ______ 
one dose of Coumadin.

 

PHYSICAL EXAM:  Temperature 99, pulse 69, respirations 22, blood pressure 112/
54. He is alert and oriented with normal speech and comprehension.  Cranial 
nerves II through XII are intact.  Motor Exam:  He is strong, 5/5 in all 
extremities other than limited by his morbid obesity other than his left foot.  
He still cannot plantarflex or dorsiflex but when he brings it up, there 
appears to be a flicker of movement with dorsiflexion.

 

LABORATORY DATA/DIAGNOSTIC DATA:  His CT was read as no acute intracranial 
pathology , I had looked at it and dictated my note before that was back 
yesterday, he had no right internal carotid stenosis.

 

His LDL was 90.

 

IMPRESSION:  I discussed with Puneet that his symptoms in his foot were either 
from more than 1 pinched nerve in his low back or from a stroke.  He cannot get 
a MRI scan due to his pacemaker and his size.  We could check a CT scan of his 
lumbar spine, which may well be abnormal.  It would be hard to prove that the 
findings we saw on his CT scan related to his footdrop but it is possible the 
therapeutic outcome would be to have surgery since he is completely bedbound 
and does not walk. He does not want to pursue surgery for this and therefore, 
is declining the CT scan of his back.  We are going to repeat a CT scan of his 
head since it is possible that a better quality scan done a couple of days 
after the acute onset of his symptoms may be able to document a stroke.  For now
, he should continue on his aspirin.  His right carotid is normal, so there is 
no need for surgical intervention there and I will continue his platelet 
therapy.  It would be reasonable to check a cardiac echo to make sure that he 
does not have a source of clot that could have caused these symptoms and I have 
taken the liberty of ordering that as well as the CT scan.  Please call me if 
there are any questions about the results of these tests.

 

Thank you for sharing his case.

 

 [id number]/[facility] #: [id number]

NICOLAS

## 2018-10-13 LAB
BASOPHILS # BLD AUTO: 0 10^3/UL (ref 0–0.2)
EOSINOPHIL # BLD AUTO: 0.2 10^3/UL (ref 0–0.6)
HCT VFR BLD AUTO: 32 % (ref 42–52)
HGB BLD-MCNC: 10.1 G/DL (ref 14–18)
INR PPP/BLD: 1.38 (ref 0.77–1.02)
LYMPHOCYTES # BLD AUTO: 0.6 10^3/UL (ref 1–4.8)
MCH RBC QN AUTO: 25 PG (ref 27–31)
MCHC RBC AUTO-ENTMCNC: 32 G/DL (ref 31–36)
MCV RBC AUTO: 80 FL (ref 80–94)
MONOCYTES # BLD AUTO: 0.4 10^3/UL (ref 0–0.8)
NEUTROPHILS # BLD AUTO: 5.8 10^3/UL (ref 1.5–7.7)
NRBC # BLD AUTO: 0 10^3/UL
NRBC BLD QL AUTO: 0.1
PLATELET # BLD AUTO: 211 10^3/UL (ref 150–450)
RBC # BLD AUTO: 3.99 10^6/UL (ref 4–5.4)
WBC # BLD AUTO: 7 10^3/UL (ref 3.5–10.8)

## 2018-10-13 RX ADMIN — TRAMADOL HYDROCHLORIDE PRN MG: 50 TABLET, FILM COATED ORAL at 04:33

## 2018-10-13 RX ADMIN — TAMSULOSIN HYDROCHLORIDE SCH MG: 0.4 CAPSULE ORAL at 21:47

## 2018-10-13 RX ADMIN — MOMETASONE FUROATE AND FORMOTEROL FUMARATE DIHYDRATE SCH PUFF: 200; 5 AEROSOL RESPIRATORY (INHALATION) at 08:03

## 2018-10-13 RX ADMIN — Medication SCH UNITS: at 08:57

## 2018-10-13 RX ADMIN — MOMETASONE FUROATE AND FORMOTEROL FUMARATE DIHYDRATE SCH PUFF: 200; 5 AEROSOL RESPIRATORY (INHALATION) at 19:14

## 2018-10-13 RX ADMIN — ATORVASTATIN CALCIUM SCH MG: 20 TABLET, FILM COATED ORAL at 21:47

## 2018-10-13 RX ADMIN — OMEPRAZOLE SCH MG: 20 CAPSULE, DELAYED RELEASE ORAL at 04:33

## 2018-10-13 RX ADMIN — TRAMADOL HYDROCHLORIDE PRN MG: 50 TABLET, FILM COATED ORAL at 21:47

## 2018-10-13 RX ADMIN — ASPIRIN SCH MG: 81 TABLET, CHEWABLE ORAL at 08:57

## 2018-10-13 RX ADMIN — FINASTERIDE SCH MG: 5 TABLET, FILM COATED ORAL at 08:57

## 2018-10-13 NOTE — PN
PROGRESS NOTE:

 

DATE OF VISIT:  10/12/2018.

 

PATIENT OF:  Donna GallowayUTE scherer

 

HISTORY:  He has no new complaints.  He still notes that his left foot does not 
dorsiflex or plantarflex.  His medications remain unchanged including his 
p.r.n. medicines, his Flomax, his warfarin for his AFib, his Prilosec, his 
Proscar, Lipitor, and aspirin.

 

PHYSICAL EXAMINATION:  Temperature 97.5, pulse 72, respiration 20, blood 
pressure 110/50.  Moves all extremities with power, but in his left foot he 
still has just trace strength in his plantar flexion and dorsiflexion of that 
foot.  He still has numbness there.

 

DIAGNOSTIC STUDIES/LAB DATA:  I reviewed his CT scan, which is a limited study, 
but does not show any clear stroke.

 

IMPRESSION AND PLAN:  I discussed with him that there is no CT evidence for a 
stroke that might have been able to be seen on MRI scan, but cannot have it 
because of both his pacer and his weight.  He is on baby aspirin and is on 
Coumadin now.  I would maintain his anticoagulation in a therapeutic range, it 
is a little bit low as of today, so may need to be adjusted.  Physical Therapy 
should see him.  He is on a little bit of a statin for his mildly elevated 
cholesterol.  I have no further recommendations.

 

Thank you for sharing his case.

 

 [id number]/[facility] #: [id number]

NICOLAS

## 2018-10-13 NOTE — PN
Subjective


Date of Service: 10/13/18


Interval History: 








Patient reports he continues to feel better everyday. Denies sob/cough. No 

fever or chills. Continues to have left foot numbness and unable to move foot 

independently. 








Family History: Unchanged from Admission


Social History: Unchanged from Admission


Past Medical History: Unchanged from Admission





Objective


Active Medications: 








Acetaminophen (Tylenol Tab*)  650 mg PO Q4H PRN


   PRN Reason: FEVER/PAIN


   Last Admin: 10/11/18 11:05 Dose:  650 mg


Albuterol/Ipratropium (Duoneb (Albuterol 2.5 Mg/Ipratropium 0.5 Mg))  1 neb INH 

Q4H PRN


   PRN Reason: SOB/WHEEZING


Aspirin (Aspirin 81 Mg Chew Tab*)  81 mg PO DAILY [facility]


   Last Admin: 10/13/18 08:57 Dose:  81 mg


Atorvastatin Calcium (Lipitor*)  20 mg PO 2100 [facility]


   Last Admin: 10/12/18 20:18 Dose:  20 mg


Cholecalciferol (Vitamin D Tab*)  2,000 units PO DAILY [facility]


   Last Admin: 10/13/18 08:57 Dose:  2,000 units


Docusate Sodium (Colace Cap*)  100 mg PO BID PRN


   PRN Reason: CONSTIPATION


Finasteride (Proscar Tab*)  5 mg PO DAILY [facility]


   Last Admin: 10/13/18 08:57 Dose:  5 mg


Mometasone Furoate/Formoterol Fumar (Dulera 200/5 Mdi*)  2 puff INH BID [facility]


   Last Admin: 10/13/18 08:03 Dose:  2 puff


Omeprazole (Prilosec Cap*)  20 mg PO DAILY@0600 [facility]


   Last Admin: 10/13/18 04:33 Dose:  20 mg


Ondansetron HCl (Zofran Inj*)  4 mg IV Q4H PRN


   PRN Reason: NAUSEA/VOMITING


Pharmacy Profile Note (Coumadin Per Pharmacy*)  1 note FOLLOW UP .PER PHARMACY 

PROTOC [facility]; Protocol


Senna (Senokot Tab*)  1 tab PO BID PRN


   PRN Reason: CONSTIPATION


Tamsulosin HCl (Flomax Cap*)  0.4 mg PO BEDTIME [facility]


   Last Admin: 10/12/18 20:18 Dose:  0.4 mg


Tramadol HCl (Ultram*)  50 mg PO Q12H PRN


   PRN Reason: PAIN


   Last Admin: 10/13/18 04:33 Dose:  50 mg


Warfarin Sodium (Coumadin Tab(*))  3 mg PO ONCE ONE


   Stop: 10/13/18 17:01








 Vital Signs - 8 hr











  10/13/18 10/13/18





  11:27 15:46


 


Temperature 97.7 F 97.7 F


 


Pulse Rate 68 70


 


Respiratory 16 24





Rate  


 


Blood Pressure 100/50 100/48





(mmHg)  


 


O2 Sat by Pulse 100 99





Oximetry  











Oxygen Devices in Use Now: Nasal Cannula


Appearance: super morbid obese male laying in bed A+O x3


Eyes: No Scleral Icterus, PERRLA


Ears/Nose/Mouth/Throat: NL Teeth, Lips, Gums, Mucous Membranes Moist


Neck: NL Appearance and Movements; NL JVP


Respiratory: Symmetrical Chest Expansion and Respiratory Effort


Cardiovascular: NL Sounds; No Murmurs; No JVD, RRR, No Edema


Abdominal: - - obese, soft, nt, 


Extremities: No Edema, No Clubbing, Cyanosis


Skin: No Rash or Ulcers, No Nodules or Sclerosis


Neurological: Alert and Oriented x 3, NL Muscle Strength and Tone


Lines/Tubes/Other Access: Clean, Dry and Intact Peripheral IV


Nutrition: Taking PO's


Result Diagrams: 


 10/13/18 04:42





 10/13/18 04:42


Microbiology and Other Data: 


 Microbiology











 10/04/18 10:05 Aerobic Blood Culture - Preliminary





 Blood Venous    No Growth Day 4





 Anaerobic Blood Culture - Preliminary





    No Growth Day 4


 


 10/04/18 10:00 Aerobic Blood Culture - Preliminary





 Blood Venous    No Growth Day 4





 Anaerobic Blood Culture - Preliminary





    No Growth Day 4


 


 10/03/18 05:53 Aerobic Blood Culture - Final





 Blood Venous    Not Reportable





 Anaerobic Blood Culture - Final





    Not Reportable





 Blood Culture - Final





    No Growth Day 5


 


 10/03/18 10:05 Gram Stain - Final





 Sputum Sputum Culture - Final





    Normal Enedelia


 


 10/03/18 01:37 Urine Culture - Final





 Urine    Proteus Mirabilis





    Enterococcus Faecalis


 


 10/03/18 00:10 Nasal Screen MRSA (PCR) - Final





 Nasal    Mrsa Not Detected














Assess/Plan/Problems-Billing


Assessment: 





Patient is a 69yo male with a PMH for CKD, CVA, morbid obesity, COPD, Afib, 

urinary retention, CHF, Obesity-Hypoventilation who was admitted for acute 

hypercarbic respiratory failure with intubation and consolidation of the lung 

along with UTI who is improving slowly on antibiotics and has been transferred 

out of the ICU. 





- Patient Problems


(1) Paresthesia of left foot


Comment: 


- Appreciate neurology consult - suspect possible CVA. Pt cannot under go MRI d/

t weight.  Lipid profile LDL 90 - recommends starting atorvastatin. Repeat CT 

brain NGT. Continue ASA, coumadin 


- Differential is pinch nerve? He is not having any back pain. Patient does not 

want a lumbar CT and is refusing at this time.   





(2) Acute respiratory failure with hypoxia and hypercarbia


Comment: 


- Resolved. Severely increase pCO2 on admission, required intubation. 


- Possible pneumonia with infiltrate on CXR - Ampicillin 7 day course - last 

day 10/13


- Restrictive and obstructive pattern of respiratory failure noted likely due 

to obesity hypoventilation syndrome and COPD


- continue CPAP   





(3) Urinary tract infection with hematuria


Comment: 


- treated


- chronic alexandre - changed on admission   





(4) CHF (congestive heart failure)


Comment: 


Restart Torsemide 20 mg daily (home dose 40 mg) 


- Hold Spironlactone d/t soft BPs


- Daily weights   





(5) Hemiparesis of right dominant side as late effect of cerebral infarction


Comment: 


- Stable consistent with previous fluctuating deficits


- Likely recrudescence of prior CVA in setting of illness


- Continue Aspirin and Warfarin    





(6) Afib


Comment: 


- Pacemaker (tachy-olinda syndrome)


- History of cardioembolic stroke, 2016.


- Continue coumadin   





(7) BPH (benign prostatic hyperplasia)


Comment: 


- Continue tamsulosin


- Chronic alexandre for urinary retention


   





(8) CKD (chronic kidney disease) stage 4, GFR 15-29 ml/min


Comment: 


- At baseline   





(9) KIM (obstructive sleep apnea)


Comment: 


- With chronic hypoxic/hypercarbic respiratory failure, on 3L O2 routinely.


- With obesity hypoventilation syndrome.


- Continue CPAP   





(10) Obesity hypoventilation syndrome


Comment: 


- Restrictive lung disease with chronic hypercarbia


- CPAP   





(11) Full code status


Comment: 


   





(12) DVT prophylaxis


Comment: 


- Warfarin (adjust dose d/t low INR)   


Status and Disposition: 





Inpatient, [facility] at discharge - potential DC tomorrow if facility will take 

him Sunday. Will remove chest wall IV but leave IJ until it is decided when he 

will be DC back to [facility] d/t difficult access. Patient will require rehab 

at [facility]. I also recommend a motorized wheelchair as previously patient was 

able to get around in a wheelchair and scoot himself with his left LE now that 

is not possible d/t this new left foot paresthesia.

## 2018-10-14 LAB — INR PPP/BLD: 1.47 (ref 0.77–1.02)

## 2018-10-14 RX ADMIN — NYSTATIN SCH APPLIC: 100000 POWDER TOPICAL at 21:19

## 2018-10-14 RX ADMIN — NYSTATIN SCH UNITS: 100000 POWDER TOPICAL at 17:20

## 2018-10-14 RX ADMIN — TAMSULOSIN HYDROCHLORIDE SCH MG: 0.4 CAPSULE ORAL at 21:07

## 2018-10-14 RX ADMIN — FINASTERIDE SCH MG: 5 TABLET, FILM COATED ORAL at 09:37

## 2018-10-14 RX ADMIN — TRAMADOL HYDROCHLORIDE PRN MG: 50 TABLET, FILM COATED ORAL at 09:37

## 2018-10-14 RX ADMIN — OMEPRAZOLE SCH MG: 20 CAPSULE, DELAYED RELEASE ORAL at 05:07

## 2018-10-14 RX ADMIN — ASPIRIN SCH MG: 81 TABLET, CHEWABLE ORAL at 09:38

## 2018-10-14 RX ADMIN — MOMETASONE FUROATE AND FORMOTEROL FUMARATE DIHYDRATE SCH PUFF: 200; 5 AEROSOL RESPIRATORY (INHALATION) at 07:24

## 2018-10-14 RX ADMIN — TORSEMIDE SCH MG: 20 TABLET ORAL at 17:22

## 2018-10-14 RX ADMIN — MOMETASONE FUROATE AND FORMOTEROL FUMARATE DIHYDRATE SCH PUFF: 200; 5 AEROSOL RESPIRATORY (INHALATION) at 19:25

## 2018-10-14 RX ADMIN — Medication SCH UNITS: at 09:37

## 2018-10-14 RX ADMIN — ATORVASTATIN CALCIUM SCH MG: 20 TABLET, FILM COATED ORAL at 21:08

## 2018-10-14 NOTE — PN
Subjective


Date of Service: 10/14/18


Interval History: 





Mr. Culver reports feeling well this morning. He continues to c/o left foot 

numbness. He is unable to move his left foot or toes, but is able to bend his 

knee. He is concerned about his ability to get around in a WC he will be unable 

to pull himself along with his left foot. He denies SOB. Using 3L NC. Nursing 

reports intertriginous rashes. No CP, N/V/D, dizziness. Reports he feels at 

about his baseline.





Family History: Unchanged from Admission


Social History: Unchanged from Admission


Past Medical History: Unchanged from Admission





Objective


Active Medications: 





Acetaminophen (Tylenol Tab*)  650 mg PO Q4H PRN


Albuterol/Ipratropium (Duoneb (Albuterol 2.5 Mg/Ipratropium 0.5 Mg))  1 neb INH 

Q4H PRN


Aspirin (Aspirin 81 Mg Chew Tab*)  81 mg PO DAILY [facility]


Atorvastatin Calcium (Lipitor*)  20 mg PO 2100 [facility]


Cholecalciferol (Vitamin D Tab*)  2,000 units PO DAILY [facility]


Docusate Sodium (Colace Cap*)  100 mg PO BID PRN


Finasteride (Proscar Tab*)  5 mg PO DAILY [facility]


Mometasone Furoate/Formoterol Fumar (Dulera 200/5 Mdi*)  2 puff INH BID [facility]


Nystatin (Nystatin Top Powder*)  1 applic TOPICAL TID [facility]


Omeprazole (Prilosec Cap*)  20 mg PO DAILY@0600 [facility]


Ondansetron HCl (Zofran Inj*)  4 mg IV Q4H PRN


Pharmacy Profile Note (Coumadin Per Pharmacy*)  1 note FOLLOW UP .PER PHARMACY 

PROTOC [facility]; Protocol


Senna (Senokot Tab*)  1 tab PO BID PRN


Tamsulosin HCl (Flomax Cap*)  0.4 mg PO BEDTIME [facility]


Tramadol HCl (Ultram*)  50 mg PO Q12H PRN


Warfarin Sodium (Coumadin Tab(*))  3 mg PO ONCE@1700 [facility]





 Vital Signs - 8 hr











  10/14/18 10/14/18 10/14/18





  07:47 08:00 09:37


 


Temperature 97.6 F  


 


Pulse Rate 69  


 


Respiratory 16 20 20





Rate   


 


Blood Pressure 111/39  





(mmHg)   


 


O2 Sat by Pulse 94  





Oximetry   














  10/14/18 10/14/18 10/14/18





  11:15 11:16 12:33


 


Temperature 97.6 F  97.6 F


 


Pulse Rate  59 70


 


Respiratory 18 16 





Rate   


 


Blood Pressure  100/46 





(mmHg)   


 


O2 Sat by Pulse 97 89 97





Oximetry   














Oxygen Devices in Use Now: Nasal Cannula - 3L NC


Appearance: Elderly obses male laying in bed in NAD


Eyes: No Scleral Icterus, PERRLA


Ears/Nose/Mouth/Throat: NL Teeth, Lips, Gums, Mucous Membranes Moist


Neck: NL Appearance and Movements; NL JVP, Trachea Midline


Respiratory: Symmetrical Chest Expansion and Respiratory Effort, Clear to 

Auscultation


Cardiovascular: NL Sounds; No Murmurs; No JVD, RRR, No Edema


Abdominal: NL Sounds; No Tenderness; No Distention, No Hepatosplenomegaly


Extremities: No Edema


Skin: - - Intertriginous rashes


Neurological: Alert and Oriented x 3, - - No sensation or active ROM to left 

foot


Lines/Tubes/Other Access: Clean, Dry and Intact Central Line


Nutrition: Taking PO's


Result Diagrams: 


 10/13/18 04:42





 10/13/18 04:42





Assess/Plan/Problems-Billing


Assessment: 





Patient is a 67yo male with a PMH for CKD, CVA, morbid obesity, COPD, Afib, 

urinary retention, CHF, Obesity-Hypoventilation who was admitted for acute 

hypercarbic respiratory failure with intubation and consolidation of the lung 

along with UTI who is improving slowly on antibiotics and has been transferred 

out of the ICU. 





- Patient Problems


(1) Acute respiratory failure with hypoxia and hypercarbia


Current Visit: Yes   Status: Acute   Priority: High   Code(s): J96.01 - ACUTE 

RESPIRATORY FAILURE WITH HYPOXIA; J96.02 - ACUTE RESPIRATORY FAILURE WITH 

HYPERCAPNIA   SNOMED Code(s): 178258765


   Comment: 


- Resolved, severely increased pCO2 on admission, required intubation


- Restrictive and obstructive pattern of respiratory failure noted likely due 

to obesity hypoventilation syndrome and COPD, possible CHF exacerbation


- Continue CPAP at HS   





(2) CHF (congestive heart failure)


Current Visit: Yes   Status: Acute   Priority: High   Code(s): I50.9 - HEART 

FAILURE, UNSPECIFIED   SNOMED Code(s): 21934707


   Comment: 


- ? if a CHF exacerbation contributed to respiratory failure on admission


- 40mg lasix IV x1 today


- Restart torsemide and spironlactone with hold parameters


- Daily weights   





(3) Paresthesia of left foot


Current Visit: Yes   Status: Acute   Priority: High   Code(s): R20.2 - 

PARESTHESIA OF SKIN   SNOMED Code(s): 065918872


   Comment: 


- Appreciate neurology consult - suspect possible CVA. 


- Cannot under go MRI d/t weight and pacer, repeat CT negative


- Continue ASA, coumadin, atorvastatin


- Differential is pinched nerve? he is not having any back pain, does not want 

a lumbar CT   





(4) Urinary tract infection with hematuria


Current Visit: Yes   Status: Acute   Priority: High   Code(s): N39.0 - URINARY 

TRACT INFECTION, SITE NOT SPECIFIED; R31.9 - HEMATURIA, UNSPECIFIED   SNOMED 

Code(s): 52770410


   Comment: 


- Completed course of abx


- Chronic alexandre changed on admission   





(5) Pneumonia


Current Visit: Yes   Status: Acute   Priority: High   Code(s): J18.9 - PNEUMONIA

, UNSPECIFIED ORGANISM   SNOMED Code(s): 552443704


   Comment: 


- Possible pneumonia with infiltrate on CXR


- Completed ampicillin 7 day course   





(6) Obesity hypoventilation syndrome


Current Visit: Yes   Status: Chronic   Priority: High   Code(s): E66.2 - MORBID 

(SEVERE) OBESITY WITH ALVEOLAR HYPOVENTILATION   SNOMED Code(s): 598033912


   Comment: 


- Restrictive lung disease with chronic hypercarbia


- CPAP   





(7) Afib


Current Visit: Yes   Status: Chronic   Priority: Medium   Code(s): I48.91 - 

UNSPECIFIED ATRIAL FIBRILLATION   SNOMED Code(s): 94997622


   Comment: 


- Pacemaker (tachy-olinda syndrome)


- History of cardioembolic stroke in 2016


- Continue coumadin   





(8) KIM (obstructive sleep apnea)


Current Visit: Yes   Status: Chronic   Priority: Medium   Code(s): G47.33 - 

OBSTRUCTIVE SLEEP APNEA (ADULT) (PEDIATRIC)   SNOMED Code(s): 45919963


   Comment: 


- With chronic hypoxic/hypercarbic respiratory failure, on 3L O2 at baseline


- With obesity hypoventilation syndrome


- Continue CPAP   





(9) Hemiparesis of right dominant side as late effect of cerebral infarction


Current Visit: Yes   Status: Chronic   Priority: Medium   Code(s): I69.351 - 

HEMIPLGA FOLLOWING CEREBRAL INFRC AFF RIGHT DOMINANT SIDE   SNOMED Code(s): 

437065440


   Comment: 


- Stable consistent with previous fluctuating deficits


- Likely recrudescence of prior CVA in setting of illness


- Continue Aspirin and Warfarin    





(10) CKD (chronic kidney disease) stage 4, GFR 15-29 ml/min


Current Visit: Yes   Status: Chronic   Code(s): N18.4 - CHRONIC KIDNEY DISEASE, 

STAGE 4 (SEVERE)   SNOMED Code(s): 940747304


   Comment: 


- At baseline   





(11) BPH (benign prostatic hyperplasia)


Current Visit: Yes   Status: Chronic   Priority: Medium   Code(s): N40.0 - 

BENIGN PROSTATIC HYPERPLASIA WITHOUT LOWER URINRY TRACT SYMP   SNOMED Code(s): 

392771173


   Comment: 


- Continue tamsulosin


- Chronic alexandre for urinary retention   





(12) Morbid obesity with body mass index of 70 and over in adult


Current Visit: Yes   Status: Chronic   Priority: Medium   Code(s): E66.01 - 

MORBID (SEVERE) OBESITY DUE TO EXCESS CALORIES; Z68.45 - BODY MASS INDEX (BMI) 

70 OR GREATER, ADULT   SNOMED Code(s): 643411293


   Comment: 


- Supportive care   





(13) Full code status


Current Visit: Yes   Status: Acute   Priority: High   Code(s): Z78.9 - OTHER 

SPECIFIED HEALTH STATUS   SNOMED Code(s): 534837592


   





(14) DVT prophylaxis


Current Visit: Yes   Status: Acute   Priority: High   Code(s): BTK4031 -    

SNOMED Code(s): 184370989


   Comment: 


- Warfarin, goal INR 2-3


   


Status and Disposition: 





Inpatient. [facility] at discharge. Patient will require rehab at [facility]. I 

also recommend a motorized wheelchair as previously patient was able to get 

around in a wheelchair and scoot himself with his left LE now that is not 

possible d/t this new left foot paresthesia.

## 2018-10-15 LAB
BASOPHILS # BLD AUTO: 0 10^3/UL (ref 0–0.2)
EOSINOPHIL # BLD AUTO: 0.1 10^3/UL (ref 0–0.6)
HCT VFR BLD AUTO: 31 % (ref 42–52)
HGB BLD-MCNC: 9.8 G/DL (ref 14–18)
INR PPP/BLD: 1.5 (ref 0.77–1.02)
INR PPP/BLD: 1.54 (ref 0.77–1.02)
LYMPHOCYTES # BLD AUTO: 0.5 10^3/UL (ref 1–4.8)
MCH RBC QN AUTO: 25 PG (ref 27–31)
MCHC RBC AUTO-ENTMCNC: 32 G/DL (ref 31–36)
MCV RBC AUTO: 80 FL (ref 80–94)
MONOCYTES # BLD AUTO: 0.3 10^3/UL (ref 0–0.8)
NEUTROPHILS # BLD AUTO: 5.2 10^3/UL (ref 1.5–7.7)
NRBC # BLD AUTO: 0 10^3/UL
NRBC BLD QL AUTO: 0.1
PLATELET # BLD AUTO: 214 10^3/UL (ref 150–450)
RBC # BLD AUTO: 3.85 10^6/UL (ref 4–5.4)
WBC # BLD AUTO: 6.2 10^3/UL (ref 3.5–10.8)

## 2018-10-15 RX ADMIN — SPIRONOLACTONE SCH MG: 25 TABLET ORAL at 08:21

## 2018-10-15 RX ADMIN — TRAMADOL HYDROCHLORIDE PRN MG: 50 TABLET, FILM COATED ORAL at 02:18

## 2018-10-15 RX ADMIN — MOMETASONE FUROATE AND FORMOTEROL FUMARATE DIHYDRATE SCH PUFF: 200; 5 AEROSOL RESPIRATORY (INHALATION) at 20:24

## 2018-10-15 RX ADMIN — TORSEMIDE SCH MG: 20 TABLET ORAL at 17:08

## 2018-10-15 RX ADMIN — Medication SCH UNITS: at 08:21

## 2018-10-15 RX ADMIN — MOMETASONE FUROATE AND FORMOTEROL FUMARATE DIHYDRATE SCH PUFF: 200; 5 AEROSOL RESPIRATORY (INHALATION) at 08:11

## 2018-10-15 RX ADMIN — NYSTATIN SCH APPLIC: 100000 POWDER TOPICAL at 08:21

## 2018-10-15 RX ADMIN — TAMSULOSIN HYDROCHLORIDE SCH MG: 0.4 CAPSULE ORAL at 21:49

## 2018-10-15 RX ADMIN — NYSTATIN SCH APPLIC: 100000 POWDER TOPICAL at 22:51

## 2018-10-15 RX ADMIN — NYSTATIN SCH APPLIC: 100000 POWDER TOPICAL at 13:05

## 2018-10-15 RX ADMIN — TRAMADOL HYDROCHLORIDE PRN MG: 50 TABLET, FILM COATED ORAL at 18:48

## 2018-10-15 RX ADMIN — OMEPRAZOLE SCH: 20 CAPSULE, DELAYED RELEASE ORAL at 05:54

## 2018-10-15 RX ADMIN — FINASTERIDE SCH MG: 5 TABLET, FILM COATED ORAL at 08:20

## 2018-10-15 RX ADMIN — ASPIRIN SCH MG: 81 TABLET, CHEWABLE ORAL at 08:20

## 2018-10-15 RX ADMIN — ATORVASTATIN CALCIUM SCH MG: 20 TABLET, FILM COATED ORAL at 21:49

## 2018-10-15 NOTE — PN
Subjective


Date of Service: 10/15/18


Interval History: 





Mr. Culver feels overall at his baseline this morning. He remembers the episode 

overnight where there was concern for a CVA. He states that he has had right 

rotator cuff problems, so there is typically some degree of weakness in his RUE

, but he feels slightly weaker than usual. No further slurred speech, though he 

does remember having slurred speech. Left foot numbness remains the same. He 

denies CP, SOB, dizziness, N/V/D. 





Family History: Unchanged from Admission


Social History: Unchanged from Admission


Past Medical History: Unchanged from Admission





Objective


Active Medications: 





Acetaminophen (Tylenol Tab*)  650 mg PO Q4H PRN


Albuterol/Ipratropium (Duoneb (Albuterol 2.5 Mg/Ipratropium 0.5 Mg))  1 neb INH 

Q4H PRN


Aspirin (Aspirin 81 Mg Chew Tab*)  81 mg PO DAILY [facility]


Atorvastatin Calcium (Lipitor*)  20 mg PO 2100 [facility]


Cholecalciferol (Vitamin D Tab*)  2,000 units PO DAILY [facility]


Docusate Sodium (Colace Cap*)  100 mg PO BID PRN


Finasteride (Proscar Tab*)  5 mg PO DAILY [facility]


Mometasone Furoate/Formoterol Fumar (Dulera 200/5 Mdi*)  2 puff INH BID TISHA


Nystatin (Nystatin Top Powder*)  1 applic TOPICAL TID TISHA


Omeprazole (Prilosec Cap*)  20 mg PO DAILY@0600 [facility]


Ondansetron HCl (Zofran Inj*)  4 mg IV Q4H PRN


Pharmacy Profile Note (Coumadin Per Pharmacy*)  1 note FOLLOW UP .PER PHARMACY 

PROTOC [facility]; Protocol


Senna (Senokot Tab*)  1 tab PO BID PRN


Spironolactone (Aldactone Tab*)  12.5 mg PO DAILY [facility]


Tamsulosin HCl (Flomax Cap*)  0.4 mg PO BEDTIME [facility]


Torsemide (Demadex*)  20 mg PO QPM [facility]


Tramadol HCl (Ultram*)  50 mg PO Q12H PRN


Warfarin Sodium (Coumadin Tab(*))  6 mg PO ONCE@1700 [facility]; Protocol





 Vital Signs - 8 hr











  10/15/18 10/15/18 10/15/18





  08:00 08:03 08:07


 


Temperature  97.4 F 


 


Pulse Rate  69 70


 


Respiratory 20 16 24





Rate   


 


Blood Pressure  104/42 





(mmHg)   


 


O2 Sat by Pulse  92 98





Oximetry   














  10/15/18





  11:02


 


Temperature 97.7 F


 


Pulse Rate 70


 


Respiratory 16





Rate 


 


Blood Pressure 100/30





(mmHg) 


 


O2 Sat by Pulse 99





Oximetry 











Oxygen Devices in Use Now: Nasal Cannula - 3L NC


Appearance: Elderly obese male laying in bed in NAD


Eyes: No Scleral Icterus, PERRLA


Ears/Nose/Mouth/Throat: NL Teeth, Lips, Gums, Mucous Membranes Moist


Neck: NL Appearance and Movements; NL JVP, Trachea Midline


Respiratory: Symmetrical Chest Expansion and Respiratory Effort, Clear to 

Auscultation


Cardiovascular: NL Sounds; No Murmurs; No JVD, RRR, No Edema


Abdominal: NL Sounds; No Tenderness; No Distention, No Hepatosplenomegaly


Extremities: No Clubbing, Cyanosis


Skin: - - Multiple scattered rashes and skin tears to BUE; open wounds to sacrum

; intertriginous rashes


Neurological: Alert and Oriented x 3, - - Absent sensation to left foot


Lines/Tubes/Other Access: Clean, Dry and Intact Central Line


Nutrition: Taking PO's


Result Diagrams: 


 10/15/18 01:19





 10/15/18 01:19





Assess/Plan/Problems-Billing


Assessment: 





Patient is a 67yo male with a PMH for CKD, CVA, morbid obesity, COPD, Afib, 

urinary retention, CHF, Obesity-Hypoventilation who was admitted for acute 

hypercarbic respiratory failure with intubation and consolidation of the lung 

along with UTI who is improving slowly on antibiotics and has been transferred 

out of the ICU. 





- Patient Problems


(1) Acute on chronic respiratory failure with hypoxia and hypercapnia


Current Visit: Yes   Status: Acute   Priority: High   Code(s): J96.21 - ACUTE 

AND CHRONIC RESPIRATORY FAILURE WITH HYPOXIA; J96.22 - ACUTE AND CHRONIC 

RESPIRATORY FAILURE WITH HYPERCAPNIA   SNOMED Code(s): 98189890


   Comment: 


- Resolved, severely increased pCO2 on admission, required intubation


- Restrictive and obstructive pattern of respiratory failure noted likely due 

to obesity hypoventilation syndrome and COPD, possible CHF exacerbation


- Continue CPAP at HS   





(2) Chronic systolic congestive heart failure


Current Visit: Yes   Status: Chronic   Priority: High   Code(s): I50.22 - 

CHRONIC SYSTOLIC (CONGESTIVE) HEART FAILURE   SNOMED Code(s): 808598563


   Comment: 


- ? if CHF exacerbation contributed to respiratory failure on admission


- Repeat CXR today shows findings suggestive of CHF


- 40mg lasix IV x1 today


- Continue torsemide and spironlactone with hold parameters


- Daily weights   





(3) Paresthesia of left foot


Current Visit: Yes   Status: Acute   Priority: High   Code(s): R20.2 - 

PARESTHESIA OF SKIN   SNOMED Code(s): 621487597


   Comment: 


- Appreciate neurology consult - suspect possible CVA/TIA


- Cannot under go MRI d/t weight and pacer, repeat CT negative


- Continue ASA, coumadin, atorvastatin


- Differential is lumbar origin, patient refusing further imaging   





(4) Pneumonia


Current Visit: Yes   Status: Acute   Priority: High   Code(s): J18.9 - PNEUMONIA

, UNSPECIFIED ORGANISM   SNOMED Code(s): 667730003


   Comment: 


- Possible pneumonia with infiltrate on CXR


- Likely cause of sepsis and subequent need for intubation


- Completed ampicillin 7 day course   





(5) Urinary tract infection with hematuria


Current Visit: Yes   Status: Acute   Priority: High   Code(s): N39.0 - URINARY 

TRACT INFECTION, SITE NOT SPECIFIED; R31.9 - HEMATURIA, UNSPECIFIED   SNOMED 

Code(s): 16440127


   Comment: 


- Catheter related, present on admission


- Completed course of abx


- Chronic alexandre changed on admission   





(6) Hemiparesis of right dominant side as late effect of cerebral infarction


Current Visit: Yes   Status: Acute   Priority: High   Code(s): I69.351 - 

HEMIPLGA FOLLOWING CEREBRAL INFRC AFF RIGHT DOMINANT SIDE   SNOMED Code(s): 

421346737


   Comment: 


- Stable consistent with previous fluctuating deficits


- Likely recrudescence of prior CVA in setting of illness


- Increased RUE weakness overnight likely combination of prior CVA and previous 

rotator cuff injury, but cannot rule out TIA as etiology


- Continue Aspirin and Warfarin    





(7) Obesity hypoventilation syndrome


Current Visit: Yes   Status: Chronic   Priority: High   Code(s): E66.2 - MORBID 

(SEVERE) OBESITY WITH ALVEOLAR HYPOVENTILATION   SNOMED Code(s): 102038088


   Comment: 


- Restrictive lung disease with chronic hypercarbia   





(8) Afib


Current Visit: Yes   Status: Chronic   Priority: Medium   Code(s): I48.91 - 

UNSPECIFIED ATRIAL FIBRILLATION   SNOMED Code(s): 86884732


   Comment: 


- Pacemaker (tachy-olinda syndrome)


- History of cardioembolic stroke in 2016


- Continue coumadin, goal INR 2-3   





(9) KIM (obstructive sleep apnea)


Current Visit: Yes   Status: Chronic   Priority: Medium   Code(s): G47.33 - 

OBSTRUCTIVE SLEEP APNEA (ADULT) (PEDIATRIC)   SNOMED Code(s): 14856595


   Comment: 


- With chronic hypoxic/hypercarbic respiratory failure, on 3L O2 at baseline


- With obesity hypoventilation syndrome


- Continue CPAP   





(10) CKD (chronic kidney disease) stage 4, GFR 15-29 ml/min


Current Visit: Yes   Status: Chronic   Code(s): N18.4 - CHRONIC KIDNEY DISEASE, 

STAGE 4 (SEVERE)   SNOMED Code(s): 511223945


   Comment: 


- At baseline   





(11) BPH (benign prostatic hyperplasia)


Current Visit: Yes   Status: Chronic   Priority: Medium   Code(s): N40.0 - 

BENIGN PROSTATIC HYPERPLASIA WITHOUT LOWER URINRY TRACT SYMP   SNOMED Code(s): 

051392592


   Comment: 


- Continue tamsulosin


- Chronic alexandre for urinary retention   





(12) Morbid obesity with body mass index of 70 and over in adult


Current Visit: Yes   Status: Chronic   Priority: Medium   Code(s): E66.01 - 

MORBID (SEVERE) OBESITY DUE TO EXCESS CALORIES; Z68.45 - BODY MASS INDEX (BMI) 

70 OR GREATER, ADULT   SNOMED Code(s): 143085478


   Comment: 


- Supportive care   





(13) Full code status


Current Visit: Yes   Status: Acute   Priority: High   Code(s): Z78.9 - OTHER 

SPECIFIED HEALTH STATUS   SNOMED Code(s): 470766101


   





(14) DVT prophylaxis


Current Visit: Yes   Status: Acute   Priority: High   Code(s): QKB8296 -    

SNOMED Code(s): 212891661


   Comment: 


- Warfarin, goal INR 2-3


   


Status and Disposition: 





Inpatient for continued diuresis and to reach therapeutic INR d/t possibility 

of recurrent TIAs. Patient will require rehab at [facility] [place]. I also recommend a 

motorized wheelchair as previously patient was able to get around in a 

wheelchair and scoot himself with his left LE now that is not possible d/t this 

new left foot paresthesia.

## 2018-10-15 NOTE — CONS
NEUROLOGY FOLLOWUP NOTE:

 

DATE OF FOLLOWUP:  10/15/18

 

LOCATION:  He is in room 446.

 

HOSPITALIST:  Dr. Garcia.

 

CHIEF COMPLAINT:  Weakness.

 

INTERVAL HISTORY:  Puneet developed some new weakness of his right upper 
extremity. He has had recurrent episodes of right-sided weakness, which 
resolved in the past. It was not clear if it was due to his rotator cuff 
problems or cerebrovascular disease.  He had a CAT scan of the brain again last 
night, which was technically limited due to movement but did not reveal any 
evidence of hemorrhage or infarction.  His INR is 1.5.  It was decided not to 
change his medical regimen.  He has been felt not to be a CTA candidate because 
of his chronic renal insufficiency. He also has a pacemaker and so between his 
weight and the pacemaker, he is not able to get MRI scan.

 

Today, he feels that his right side is the way it usually is.  He has numbness 
of his left foot, which he says has been since last Tuesday.  Dr. Herr saw 
him for that on 10/10/18.  He states he has noticed it since he came out of the 
intensive care unit.

 

Extensive medication list is reviewed.  There have been no changes since 
yesterday.

 

LABORATORY STUDIES:  From today notable for BUN of 53 which is at his 
historical norms, creatinine 2.49.  His CBC is unchanged today.

 

PHYSICAL EXAMINATION:  On exam, he is morbidly obese.  Temperature 98.4, blood 
pressure 120/53, heart rate in the 70s.  Heart tones are extremely distant but 
I do not hear any murmurs.  He has multiple ecchymoses on his limbs.  Facial 
musculature is symmetric.  Eye movements are normal.  He has limited range of 
motion about the right shoulder but good strength distally in the upper 
extremities and at the proximal left upper extremity.  He reports absent 
discrimination to pin and vibration in the foot from the ankle down including 
absent vibration at the ankle. He has diminished pin discrimination in the 
right leg to just above the ankle.  He is areflexic.  Plantar responses are 
equivocal on the right, flexor on the left. He is not able to dorsiflex or 
plantarflex the left foot.  He does move it spontaneously  but not when asked 
to formally move it.  He seems to have reasonable proximal strength in the left 
leg at least in terms of being able to flex it and extend it fully.

 

IMPRESSION:  High risk for cerebrovascular disease with multiple medical 
comorbidities.  He is already on warfarin, which is still subtherapeutic.  He 
is also on aspirin, which he came in on.  He has had numerous episodes of right-
sided weakness, which resolve over time.  I do not recommend adding any further 
antiplatelet agents.  He is not a candidate for MRI imaging.  He did have a 
carotid ultrasound this admission, which revealed no extracranial carotid 
stenosis.  There are no further interventions likely to be of benefit in terms 
of further lowering his risk of cerebrovascular disease.  His last cholesterol 
was 128 with an LDL of 90.  He is on atorvastatin 20 mg.  His blood pressure is 
adequate.  I do not have any additional recommendations at this point in time.

 

 [id number]/[facility] #: [id number]

NICOLAS

## 2018-10-15 NOTE — RAD
EXAM: 

 CT Head Without Intravenous Contrast 



EXAM DATE/TIME: 

 10/15/2018 1:33 AM 



CLINICAL HISTORY: 

 68 years old, male; Signs and symptoms; Weakness, extremity; Right; Additional 

info: Rue weakness R/O CVA 



TECHNIQUE: 

 Axial computed tomography images of the head/brain without intravenous 

contrast. 

 All CT scans at this facility use at least one of these dose optimization 

techniques: automated exposure control; mA and/or kV adjustment per patient 

size (includes targeted exams where dose is matched to clinical indication); or 

iterative reconstruction. 



COMPARISON: 

 BRAIN WO CT BRAIN WO 10/12/2018 9:17 AM 



FINDINGS: 

 Brain:  Mild periventricular and subcortical low attenuation without adjacent 

mass effect. No acute ischemic changes, extra axial fluid collections, 

intraparenchymal hemorrhage, or midline shift. 

 Ventricles:  The ventricles and extraventricular CSF spaces are widened 

although symmetrically positioned along the midline. 

 Bones/joints: Normal. No acute fracture. 

 Sinuses: Normal as visualized. No acute sinusitis. 

 Mastoid air cells:  Opacified left mastoid air cells. Associated left middle 

ear fluid. Right mastoid air cells are clear. 

 Soft tissues: Normal. 

 Vasculature:  The vasculature demonstrates diffuse mild atherosclerotic 

calcification. 



IMPRESSION: 

1. No acute intracranial abnormality. ASPECT = 10. 

2. Age-related atrophy and mild chronic small vessel ischemic disease. 



To contact [facility] with a general question: [facility] - [phone]

For direct physician to physician contact: Physician Hotline - [phone]

[facility] ([facility] Facility ID #[id number])

## 2018-10-15 NOTE — PN
Hospitalist Progress Note


Date of Service: 10/15/18


HOSPITALIST ADDENDUM


Called by RN because patient had more numbness on his LLE and more weaknes on 

RUE.


Evaluated at bedside. VSS 


Has proximal RUE weakness, RN is unclear when it started.


Complex case with multiple comorbs, prior CVA, on Warfarin, but INR not 

therapeutic.


Code [facility] called - awaiting CT brain and labs.

## 2018-10-15 NOTE — RAD
INDICATION:  Congestive heart failure.



COMPARISON:  Comparison is made with a prior study from October 10, 2018.



TECHNIQUE: 3 portable films of the chest were obtained. The exam is significantly limited

by the patient's body habitus.



FINDINGS: The heart is moderately enlarged and unchanged. There is a transvenous pacemaker

present.



There are diffuse prominence of the interstitial markings and a small left pleural

effusion. The right lung base is cut off on the film further limiting the study.



IMPRESSION:  

1. LIMITED STUDY.

2. FINDINGS SUGGESTIVE OF CONGESTIVE HEART FAILURE.

## 2018-10-16 LAB — INR PPP/BLD: 1.52 (ref 0.77–1.02)

## 2018-10-16 RX ADMIN — ACETAMINOPHEN PRN MG: 325 TABLET ORAL at 05:46

## 2018-10-16 RX ADMIN — TAMSULOSIN HYDROCHLORIDE SCH MG: 0.4 CAPSULE ORAL at 21:06

## 2018-10-16 RX ADMIN — NYSTATIN SCH APPLIC: 100000 POWDER TOPICAL at 21:08

## 2018-10-16 RX ADMIN — ATORVASTATIN CALCIUM SCH MG: 20 TABLET, FILM COATED ORAL at 21:06

## 2018-10-16 RX ADMIN — TRAMADOL HYDROCHLORIDE PRN MG: 50 TABLET, FILM COATED ORAL at 05:46

## 2018-10-16 RX ADMIN — MOMETASONE FUROATE AND FORMOTEROL FUMARATE DIHYDRATE SCH PUFF: 200; 5 AEROSOL RESPIRATORY (INHALATION) at 08:08

## 2018-10-16 RX ADMIN — MOMETASONE FUROATE AND FORMOTEROL FUMARATE DIHYDRATE SCH PUFF: 200; 5 AEROSOL RESPIRATORY (INHALATION) at 20:29

## 2018-10-16 RX ADMIN — ASPIRIN SCH MG: 81 TABLET, CHEWABLE ORAL at 09:43

## 2018-10-16 RX ADMIN — FINASTERIDE SCH MG: 5 TABLET, FILM COATED ORAL at 09:42

## 2018-10-16 RX ADMIN — NYSTATIN SCH APPLIC: 100000 POWDER TOPICAL at 09:43

## 2018-10-16 RX ADMIN — TORSEMIDE SCH MG: 20 TABLET ORAL at 18:15

## 2018-10-16 RX ADMIN — OMEPRAZOLE SCH MG: 20 CAPSULE, DELAYED RELEASE ORAL at 05:46

## 2018-10-16 RX ADMIN — TRAMADOL HYDROCHLORIDE PRN MG: 50 TABLET, FILM COATED ORAL at 18:15

## 2018-10-16 RX ADMIN — Medication SCH UNITS: at 09:42

## 2018-10-16 RX ADMIN — SPIRONOLACTONE SCH MG: 25 TABLET ORAL at 09:44

## 2018-10-16 RX ADMIN — NYSTATIN SCH APPLIC: 100000 POWDER TOPICAL at 14:13

## 2018-10-16 RX ADMIN — ACETAMINOPHEN PRN MG: 325 TABLET ORAL at 21:06

## 2018-10-16 NOTE — PN
Subjective


Date of Service: 10/16/18


Interval History: 





Mr. Culver has no complaints this morning. He denies pain. Right arm weakness is 

improved since yesterday and he was noted to be eating with his right hand 

without difficulty. Left foot weakness remains the same. No changes in 

breathing. Denies CP, SOB, N/V/D, dizziness. Chronic alexandre with good output. 

There is some weight discrepancies which I discussed with nursing. Different 

scales have been used, though yesterday he was 553lbs and today 530lbs both 

using the bed scale. It is still unclear if this is accurate.





Family History: Unchanged from Admission


Social History: Unchanged from Admission


Past Medical History: Unchanged from Admission





Objective


Active Medications: 





Acetaminophen (Tylenol Tab*)  650 mg PO Q4H PRN


Albuterol/Ipratropium (Duoneb (Albuterol 2.5 Mg/Ipratropium 0.5 Mg))  1 neb INH 

Q4H PRN


Aspirin (Aspirin 81 Mg Chew Tab*)  81 mg PO DAILY [facility]


Atorvastatin Calcium (Lipitor*)  20 mg PO 2100 [facility]


Cholecalciferol (Vitamin D Tab*)  2,000 units PO DAILY [facility]


Docusate Sodium (Colace Cap*)  100 mg PO BID PRN


Finasteride (Proscar Tab*)  5 mg PO DAILY [facility]


Mometasone Furoate/Formoterol Fumar (Dulera 200/5 Mdi*)  2 puff INH BID [facility]


Nystatin (Nystatin Top Powder*)  1 applic TOPICAL TID [facility]


Omeprazole (Prilosec Cap*)  20 mg PO DAILY@0600 [facility]


Ondansetron HCl (Zofran Inj*)  4 mg IV Q4H PRN


Pharmacy Profile Note (Coumadin Daily Reminder*)  0 note FOLLOW UP 1700 [facility]; 

Protocol


Senna (Senokot Tab*)  1 tab PO BID PRN


Spironolactone (Aldactone Tab*)  12.5 mg PO DAILY [facility]


Tamsulosin HCl (Flomax Cap*)  0.4 mg PO BEDTIME [facility]


Torsemide (Demadex*)  20 mg PO QPM TISHA


Tramadol HCl (Ultram*)  50 mg PO Q12H PRN





 Vital Signs - 8 hr











  10/16/18 10/16/18 10/16/18





  05:46 07:12 07:25


 


Temperature  97.8 F 


 


Pulse Rate  70 70


 


Respiratory 24 16 





Rate   


 


Blood Pressure   110/50





(mmHg)   


 


O2 Sat by Pulse   95





Oximetry   














  10/16/18 10/16/18





  08:11 08:13


 


Temperature  


 


Pulse Rate 67 


 


Respiratory 20 20





Rate  


 


Blood Pressure  





(mmHg)  


 


O2 Sat by Pulse 93 





Oximetry  











Oxygen Devices in Use Now: Nasal Cannula - 3L NC


Appearance: Elderly obese male laying in bed in NAD


Eyes: No Scleral Icterus


Ears/Nose/Mouth/Throat: NL Teeth, Lips, Gums, Mucous Membranes Moist


Neck: NL Appearance and Movements; NL JVP, Trachea Midline


Respiratory: Symmetrical Chest Expansion and Respiratory Effort, Clear to 

Auscultation


Cardiovascular: NL Sounds; No Murmurs; No JVD, RRR, - - +1 pitting edema to 

thighs and buttocks


Abdominal: NL Sounds; No Tenderness; No Distention, No Hepatosplenomegaly


Extremities: No Clubbing, Cyanosis


Skin: - - Multiple ruptured blisters on BUE, wound to sacrum resulting from 

ruptured blister, melipex dressings in place


Neurological: Alert and Oriented x 3, - - Absent sensation to left foot


Lines/Tubes/Other Access: Clean, Dry and Intact Central Line


Nutrition: Taking PO's


Result Diagrams: 


 10/15/18 01:19





 10/16/18 06:10





Assess/Plan/Problems-Billing


Assessment: 





Patient is a 69yo male with a PMH for CKD, CVA, morbid obesity, COPD, Afib, 

urinary retention, CHF, Obesity-Hypoventilation who was admitted for acute 

hypercarbic respiratory failure with intubation and consolidation of the lung 

along with UTI who is improving slowly on antibiotics and has been transferred 

out of the ICU, now being diuresed.





- Patient Problems


(1) Chronic systolic congestive heart failure


Current Visit: Yes   Status: Chronic   Priority: High   Code(s): I50.22 - 

CHRONIC SYSTOLIC (CONGESTIVE) HEART FAILURE   SNOMED Code(s): 971463865


   Comment: 


- ? if CHF exacerbation contributed to respiratory failure on admission


- Repeat CXR today shows findings suggestive of CHF


- 40mg lasix IV x1 again today


- Continue torsemide and spironlactone with hold parameters


- Daily weights   





(2) Paresthesia of left foot


Current Visit: Yes   Status: Acute   Priority: High   Code(s): R20.2 - 

PARESTHESIA OF SKIN   SNOMED Code(s): 458611855


   Comment: 


- Appreciate neurology consult - suspect possible CVA/TIA


- Cannot under go MRI d/t weight and pacer, repeat CT negative


- Continue ASA, coumadin, atorvastatin


- Differential is lumbar origin, patient refusing further imaging   





(3) Hemiparesis of right dominant side as late effect of cerebral infarction


Current Visit: Yes   Status: Acute   Priority: High   Code(s): I69.351 - 

HEMIPLGA FOLLOWING CEREBRAL INFRC AFF RIGHT DOMINANT SIDE   SNOMED Code(s): 

679751705


   Comment: 


- Stable consistent with previous fluctuating deficits


- Likely recrudescence of prior CVA in setting of illness


- Increased RUE weakness likely combination of prior CVA and previous rotator 

cuff injury, but cannot rule out TIA as etiology


- Continue Aspirin and Warfarin; needs to have therapeutic INR at d/c   





(4) Friction blisters of the skin


Current Visit: Yes   Status: Acute   Priority: High   Code(s): T14.8XXA - OTHER 

INJURY OF UNSPECIFIED BODY REGION, INITIAL ENCOUNTER   SNOMED Code(s): 01885227


   Comment: 


- Multiple ruptured blisters on BUE and sacrum d/t friction and moisture


- Appreciate wound consult


- Triple abx and optilock to sacrum per recommendation from Dr. Gomez


- Mepilex to BUE   





(5) Acute on chronic respiratory failure with hypoxia and hypercapnia


Current Visit: Yes   Status: Acute   Priority: High   Code(s): J96.21 - ACUTE 

AND CHRONIC RESPIRATORY FAILURE WITH HYPOXIA; J96.22 - ACUTE AND CHRONIC 

RESPIRATORY FAILURE WITH HYPERCAPNIA   SNOMED Code(s): 99474469


   Comment: 


- Resolved, severely increased pCO2 on admission, required intubation


- Restrictive and obstructive pattern of respiratory failure noted likely due 

to obesity hypoventilation syndrome and COPD, possible CHF exacerbation


- Continue CPAP at HS   





(6) Pneumonia


Current Visit: Yes   Status: Acute   Priority: High   Code(s): J18.9 - PNEUMONIA

, UNSPECIFIED ORGANISM   SNOMED Code(s): 622240922


   Comment: 


- Possible pneumonia with infiltrate on CXR


- Likely cause of sepsis and subequent need for intubation


- Completed ampicillin 7 day course   





(7) Urinary tract infection with hematuria


Current Visit: Yes   Status: Acute   Priority: High   Code(s): N39.0 - URINARY 

TRACT INFECTION, SITE NOT SPECIFIED; R31.9 - HEMATURIA, UNSPECIFIED   SNOMED 

Code(s): 98591939


   Comment: 


- Catheter related, present on admission


- Completed course of abx


- Chronic alexandre changed on admission   





(8) Obesity hypoventilation syndrome


Current Visit: Yes   Status: Chronic   Priority: High   Code(s): E66.2 - MORBID 

(SEVERE) OBESITY WITH ALVEOLAR HYPOVENTILATION   SNOMED Code(s): 233517956


   Comment: 


- Restrictive lung disease with chronic hypercarbia   





(9) Afib


Current Visit: Yes   Status: Chronic   Priority: Medium   Code(s): I48.91 - 

UNSPECIFIED ATRIAL FIBRILLATION   SNOMED Code(s): 88507096


   Comment: 


- Pacemaker (tachy-olinda syndrome)


- History of cardioembolic stroke in 2016


- Continue coumadin, goal INR 2-3   





(10) KIM (obstructive sleep apnea)


Current Visit: Yes   Status: Chronic   Priority: Medium   Code(s): G47.33 - 

OBSTRUCTIVE SLEEP APNEA (ADULT) (PEDIATRIC)   SNOMED Code(s): 42332538


   Comment: 


- With chronic hypoxic/hypercarbic respiratory failure, on 3L O2 at baseline


- With obesity hypoventilation syndrome


- Continue CPAP   





(11) CKD (chronic kidney disease) stage 4, GFR 15-29 ml/min


Current Visit: Yes   Status: Chronic   Code(s): N18.4 - CHRONIC KIDNEY DISEASE, 

STAGE 4 (SEVERE)   SNOMED Code(s): 490293293


   Comment: 


- At baseline   





(12) BPH (benign prostatic hyperplasia)


Current Visit: Yes   Status: Chronic   Priority: Medium   Code(s): N40.0 - 

BENIGN PROSTATIC HYPERPLASIA WITHOUT LOWER URINRY TRACT SYMP   SNOMED Code(s): 

573098504


   Comment: 


- Continue tamsulosin


- Chronic alexandre for urinary retention   





(13) Morbid obesity with body mass index of 70 and over in adult


Current Visit: Yes   Status: Chronic   Priority: Medium   Code(s): E66.01 - 

MORBID (SEVERE) OBESITY DUE TO EXCESS CALORIES; Z68.45 - BODY MASS INDEX (BMI) 

70 OR GREATER, ADULT   SNOMED Code(s): 121052126


   Comment: 


- Supportive care   





(14) Full code status


Current Visit: Yes   Status: Acute   Priority: High   Code(s): Z78.9 - OTHER 

SPECIFIED HEALTH STATUS   SNOMED Code(s): 481217554


   





(15) DVT prophylaxis


Current Visit: Yes   Status: Acute   Priority: High   Code(s): UBI2754 -    

SNOMED Code(s): 483502691


   Comment: 


- Warfarin, goal INR 2-3


   


Status and Disposition: 





Inpatient for continued diuresis and to reach therapeutic INR d/t possibility 

of recurrent TIAs. Patient will require rehab at [facility]. I also recommend a 

motorized wheelchair as previously patient was able to get around in a 

wheelchair and scoot himself with his left LE now that is not possible d/t this 

new left foot paresthesia.

## 2018-10-17 VITALS — SYSTOLIC BLOOD PRESSURE: 102 MMHG | DIASTOLIC BLOOD PRESSURE: 45 MMHG

## 2018-10-17 LAB — INR PPP/BLD: 2 (ref 0.77–1.02)

## 2018-10-17 RX ADMIN — TRAMADOL HYDROCHLORIDE PRN MG: 50 TABLET, FILM COATED ORAL at 06:22

## 2018-10-17 RX ADMIN — SPIRONOLACTONE SCH MG: 25 TABLET ORAL at 08:25

## 2018-10-17 RX ADMIN — ACETAMINOPHEN PRN MG: 325 TABLET ORAL at 06:22

## 2018-10-17 RX ADMIN — NYSTATIN SCH APPLIC: 100000 POWDER TOPICAL at 08:25

## 2018-10-17 RX ADMIN — OMEPRAZOLE SCH MG: 20 CAPSULE, DELAYED RELEASE ORAL at 06:22

## 2018-10-17 RX ADMIN — MOMETASONE FUROATE AND FORMOTEROL FUMARATE DIHYDRATE SCH PUFF: 200; 5 AEROSOL RESPIRATORY (INHALATION) at 08:18

## 2018-10-17 RX ADMIN — FINASTERIDE SCH MG: 5 TABLET, FILM COATED ORAL at 08:25

## 2018-10-17 RX ADMIN — Medication SCH UNITS: at 08:25

## 2018-10-17 RX ADMIN — ASPIRIN SCH MG: 81 TABLET, CHEWABLE ORAL at 08:25

## 2018-10-17 NOTE — DS
*** AMENDED REPORT NOW INCLUDES DESIGNATED COSIGNER - ESIGNED BEFORE ADJUSTMENT 
***



CC:  [facility] *

 

DISCHARGE SUMMARY

 

DATE OF ADMISSION:  10/02/18

 

DATE OF DISCHARGE:  10/17/18

 

PRIMARY CARE PROVIDER:  [facility]

 

ATTENDING PHYSICIAN:  Dr. Carmela York * (dictated by Manda Garner NP)

 

PRIMARY DIAGNOSES:

1.  Acute on chronic respiratory failure with hypoxia and hypercapnia.

2.  Pneumonia.

3.  Urinary tract infection with hematuria, catheter related.

4.  Systolic congestive heart failure.

5.  Obesity hypoventilation syndrome.

6.  Obstructive sleep apnea.

7.  Right-sided hemiparesis as a result of previous cerebrovascular accident.

8.  Left foot paresthesia.

9.  Friction blisters of the skin.

 

SECONDARY DIAGNOSES:

1.  Atrial fibrillation.

2.  Chronic kidney disease, stage 4.

3.  Benign prostatic hypertrophy.

4.  Morbid obesity.

5.  History of pacemaker/AICD placement.

 

STUDIES WHILE IN THE HOSPITAL:

1.  Chest x-ray on 10/02/18 reads as technically limited study. Cardiomegaly. 
Pulmonary interstitial edema. Probable left pleural effusion.

2.  Chest x-ray on 10/02/18 reads limited study. An endotracheal tube is noted 
with the tip overlying the trachea at the level of the left clavicle. A gastric 
is noted. Cardiomegaly. Pulmonary interstitial edema. Probable left pleural 
effusion with small right pleural effusion.

3.  Chest x-ray on 10/03/18 reads as limited study. An endotracheal tube is 
noted with the tip overlying the trachea between the clavicles and the amanda. 
A gastric tube is noted with the tip in the left upper quadrant. A right 
internal jugular venous catheter is noted with the tip overlying the expected 
location of the superior vena cava. There has been interval development of 
complete opacification of the left hemithorax which likely presents combination 
of left pleural effusion and left lung atelectasis. Pulmonary interstitial 
edema.

4.  Chest x-ray on 10/03/18 reads as markedly limited study. Cardiomegaly, 
pulmonary edema, improved aeration of the left lung apex with persistent air 
space disease of the left mid and lower lung fields. No appreciable 
pneumothorax. Again, noted are an endotracheal tube, a gastric tube and a right 
internal jugular venous catheter, as well as a left-sided AICD.

5.  Chest x-ray on 10/04/18 reads as limited study. Lines and tubes as 
previously noted. Cardiomegaly. Pulmonary interstitial edema. Bilateral pleural 
effusions. Left lower lung consolidation with improved aeration compared to 10/
03/18.

6.  Brain CT on 10/10/18 reads as no acute intracranial pathology.

7.  Carotid Doppler study on 10/10/18 reads as no right internal carotid artery 
stenosis by NASCET criteria. The left internal carotid artery and vertebral 
artery were not able to be evaluated.

8.  Chest x-ray on 10/10/18 reads as markedly limited study. Cardiomegaly, 
pulmonary interstitial edema, probably left pleural effusion.

9.  Transthoracic echocardiogram on 10/11/18 reads as the echo study quality is 
poor and essentially inconclusive. There is moderate dilation of the aortic 
root compared to the prior echocardiogram completed 07/30/18. Prior study 
essentially inconclusive as well despite use of echo enhancement agent. Aortic 
root reportedly mildly dilated prior.

10.  Brain CT on 10/12/18 reads as limited study. Within the limitations of the 
study there is no acute intracranial pathology.

11.  Brain CT on 10/15/18 reads as no acute intracranial abnormality. ASPECTS 
score is 10. Age related atrophy and mild chronic small vessel ischemic disease.

12.  Chest x-ray on 10/15/18 reads as limited study. Findings suggestive of 
congestive heart failure.

 

PROCEDURES WHILE IN THE HOSPITAL:

1.  On 10/02/18 the patient was intubated in the emergency room.

2.  On 10/03/18 a central venous catheter was placed for hemodynamic monitoring 
and intravenous access by Dr. Forrester. 

 

CONSULTATIONS WHILE IN THE HOSPITAL:

1.  Patient was seen in consultation by Dr. Herr from Neurology on 10/10/18 
for questionable stroke. At that point he noted that patient was not a 
candidate for tPA. He was out of the time window. He was not a candidate for an 
MRI due to presence of a pacemaker. He felt that the new findings of left foot 
weakness could be a stroke, or lumbar nerve root, or lumbar stenosis. He 
recommended continuing Coumadin. He saw the patient the next day on 10/11/18 at 
which time he recommended a CT scan for further evaluation. On 10/12/18 he saw 
the patient again and recommended continuing baby aspirin and Coumadin. He had 
no further recommendations.

2.  Patient was seen in consultation by Dr. Koo from Neurology 10/15/18 at 
which he noted the patient was high risk for cerebrovascular disease. He did 
not recommend adding any other antiplatelet agents. He recommended continuing 
his current medication regimen and had no further recommendations at that time.

 

DISCHARGE MEDICATIONS:

New medications:

1.  Atorvastatin 20 mg p.o. daily.

2.  Nystatin powder 1 application topical t.i.d.

 

Changed home medications:  

1.  Coumadin 5 mg p.o. daily (previously was 2.5 mg p.o. daily).

 

Continued home medications:

1.  Finasteride 5 mg p.o. daily.

2.  Spironolactone 12.5 mg p.o. daily.

3.  Tamsulosin 0.4 mg p.o. daily.

4.  Torsemide 40 mg p.o. daily.

5.  Acetaminophen 600 mg p.o. daily at bedtime.

6.  Acetaminophen 600 mg p.o. every 8 hours p.r.n. fever or pain.

7.  Albuterol nebulizer 2.5 mg inhalation every 2 hours p.r.n.

8.  Aspirin 81 mg p.o. daily.

9.  Dulcolax suppository 10 mg p.r. p.r.n.

10.  Vitamin D 2000 units p.o. daily.

11.  Advair Diskus 250-50 one puff b.i.d.

12.  Atrovent nebulizer 0.5 mg inhalation every 6 hours p.r.n.

13.  Milk of Magnesia 30 mL p.o. daily p.r.n.

14.  Oxycodone 5 mg p.o. every 6 hours p.r.n.

15.  Oxycodone 5 mg p.o. daily.

16.  Senokot S 1 tab p.o. daily.

17.  Fleet enema 1 enema p.r. daily p.r.n. constipation.

 

Discontinued home medications:  None.

 

HISTORY OF PRESENT ILLNESS AND HOSPITAL COURSE:  Mr. Culver is a 68-year-old 
male with a past medical history of chronic atrial fibrillation on 
anticoagulation, urinary retention with chronic indwelling Vallecillo, morbid 
obesity with hypoventilation syndrome, cardiomegaly, chronic systolic 
congestive heart failure, CKD stage 4, right-sided hemiparesis secondary to 
previous CVA, COPD on 3 liters nasal cannula at baseline, obstructive sleep 
apnea and status post pacemaker/AICD placement, who presented to the [facility] 
emergency room on 10/02/18 from [facility] for altered mental 
status and respiratory distress. Please see the history and physical by Dr. Sangeeta Breen for a complete summary of the events leading up to this 
hospitalization, but in short, the patient was noted to be short of breath and 
hypoxic at [facility]. It was also noted he had recently gained weight. In the 
emergency room he was found to be in hypercapnic respiratory failure and was 
promptly intubated. He was admitted to the ICU and sedated with a fentanyl drip 
and Ativan p.r.n. He was also noted to have acute on chronic kidney injury on 
arrival likely related to sepsis. He presented with a high white count and 
hypothermia, and was found to have a urinary tract infection, as well as 
pneumonia. He was started on cefepime and vancomycin. 



He was admitted and placed under the care of the Intensivist Service. On 10/03/
18 the patient was placed on Levophed for septic shock. On 10/03/18 the patient 
also had a bronchoscopy. On 10/04/18 urine cultures resulted with Proteus 
mirabilis and Enterococcus faecalis. On 10/05/18 the patient was placed on 
ampicillin after sensitivities were obtained. The patient was ultimately 
extubated on 10/07/18 and was transferred up to the Telemetry Unit. The patient 
completed a course of ampicillin which treated both his urinary tract infection 
and his pneumonia. It was determined that his acute on chronic respiratory 
failure was likely related to obesity hypoventilation syndrome, COPD, pneumonia 
and a possible CHF exacerbation. His Vallecillo catheter was changed on admission. 
He was diuresed with Lasix from  10/04 through 10/16. It is not clear if his 
daily weights in the hospital have been accurate due to changes in scales, 
although on 10/14/18 the patient was noted to be 559 pounds on the bed scale, 
and as of 10/17/18 he is noted to be 519 pounds on the bed scale. During this 
hospitalization after being transferred out of the ICU patient also began to 
experience paresthesias of the left foot. He was evaluated by Neurology as 
noted above. It is possible that this is related to a new CVA, though we are 
unable to do an MRI for definitive diagnosis. It was recommended that he 
continue aspirin, Coumadin and a statin. On the overnight of 10/15/18 there was 
concern for a CVA due to dysarthria and increased right arm weakness. Imaging 
was negative as noted above. Again, it is unclear if the patient is having 
recurrent TIAs or CVA, although Neurology continues to recommend continuing his 
current medication regimen. During this hospitalization it was also noted that 
the patient has friction blisters of the skin on the upper extremities, as well 
as the sacrum. These blisters subsequently ruptured and a Wound Clinic consult 
was ordered. Recommendation is for triple antibiotic and OptiLock to sacrum and 
Mepilex to bilateral upper arms as needed. As of the day of discharge the 
patient has a therapeutic INR of 2.00. He appears to be fully diuresed and 
denies any shortness of breath or cough. He is at his baseline oxygen 
requirement of 3 liters.

 

Mr. Culver is stable for discharge today. Vital signs are as follows: Temp 97.8, 
heart rate 74, respiratory rate 20, oxygen saturation 94% on 3 liters nasal 
cannula. Blood pressure 102/45.

 

DISCHARGE PLAN:  Mr. Culver will be discharged back to [facility]. It is recommended that he receive rehab while there. Activity is as 
tolerated. Note at this point because of the new onset of left foot weakness, 
along with his history of hemiparesis of the right side, it is recommended that 
the patient get a motorized wheelchair as it is unlikely he will be able to 
roll himself around in a wheelchair as he had been previously. Diet will be 
heart healthy, no caffeine. Daily weights should be obtained to ensure the 
patient is not retaining fluid due to his CHF. Per the Wound Clinic it is 
recommended that the patient receive triple antibiotic and OptiLock to the 
sacrum, as well as Mepilex to bilateral upper extremity lesions as necessary. 
It appears as though the ruptured blisters are all healing well at this time 
and he may only require barrier cream going forward. He should be seen in 
followup at [facility].  The patient should return to the 
emergency room or nearest hospital for any worsening of symptoms shortness of 
breath, lightheadedness, dizziness, chest discomfort, high fevers, chills, 
night sweats, loss of consciousness or any other worrisome signs or symptoms.

 

This is a summarized report of a very complex medical history and lengthy 
hospital stay. For further details, please see the entire medical record. 



TIME SPENT:  Approximately 60 minutes were spent on this discharge. Greater 
than half of that time was spent face-to-face with the patient discussing 
discharge plans and instructions.

 

____________________________________ MANDA GARNER, UTE

 

[id number] #: [id number]

NICOLAS

## 2018-11-13 ENCOUNTER — HOSPITAL ENCOUNTER (INPATIENT)
Dept: HOSPITAL 25 - ED | Age: 68
LOS: 13 days | DRG: 207 | End: 2018-11-26
Attending: INTERNAL MEDICINE | Admitting: HOSPITALIST
Payer: MEDICARE

## 2018-11-13 DIAGNOSIS — Z51.81: ICD-10-CM

## 2018-11-13 DIAGNOSIS — Z82.3: ICD-10-CM

## 2018-11-13 DIAGNOSIS — Z99.81: ICD-10-CM

## 2018-11-13 DIAGNOSIS — Z79.01: ICD-10-CM

## 2018-11-13 DIAGNOSIS — K59.00: ICD-10-CM

## 2018-11-13 DIAGNOSIS — Z80.1: ICD-10-CM

## 2018-11-13 DIAGNOSIS — M17.10: ICD-10-CM

## 2018-11-13 DIAGNOSIS — E66.2: ICD-10-CM

## 2018-11-13 DIAGNOSIS — E87.5: ICD-10-CM

## 2018-11-13 DIAGNOSIS — Z87.891: ICD-10-CM

## 2018-11-13 DIAGNOSIS — J96.92: Primary | ICD-10-CM

## 2018-11-13 DIAGNOSIS — Z86.14: ICD-10-CM

## 2018-11-13 DIAGNOSIS — N18.4: ICD-10-CM

## 2018-11-13 DIAGNOSIS — I69.354: ICD-10-CM

## 2018-11-13 DIAGNOSIS — Z95.810: ICD-10-CM

## 2018-11-13 DIAGNOSIS — J44.9: ICD-10-CM

## 2018-11-13 DIAGNOSIS — I48.2: ICD-10-CM

## 2018-11-13 DIAGNOSIS — I13.0: ICD-10-CM

## 2018-11-13 DIAGNOSIS — N40.0: ICD-10-CM

## 2018-11-13 DIAGNOSIS — N39.0: ICD-10-CM

## 2018-11-13 DIAGNOSIS — Z80.0: ICD-10-CM

## 2018-11-13 DIAGNOSIS — I69.322: ICD-10-CM

## 2018-11-13 DIAGNOSIS — I50.42: ICD-10-CM

## 2018-11-13 LAB
BASOPHILS # BLD AUTO: 0.1 10^3/UL (ref 0–0.2)
EOSINOPHIL # BLD AUTO: 0 10^3/UL (ref 0–0.6)
HCT VFR BLD AUTO: 33 % (ref 42–52)
HGB BLD-MCNC: 10.1 G/DL (ref 14–18)
INR PPP/BLD: 1.73 (ref 0.77–1.02)
LYMPHOCYTES # BLD AUTO: 0.3 10^3/UL (ref 1–4.8)
MCH RBC QN AUTO: 25 PG (ref 27–31)
MCHC RBC AUTO-ENTMCNC: 30 G/DL (ref 31–36)
MCV RBC AUTO: 81 FL (ref 80–94)
MONOCYTES # BLD AUTO: 0.4 10^3/UL (ref 0–0.8)
NEUTROPHILS # BLD AUTO: 7.2 10^3/UL (ref 1.5–7.7)
NRBC # BLD AUTO: 0 10^3/UL
NRBC BLD QL AUTO: 0.1
PLATELET # BLD AUTO: 207 10^3/UL (ref 150–450)
RBC # BLD AUTO: 4.1 10^6/UL (ref 4–5.4)
WBC # BLD AUTO: 7.9 10^3/UL (ref 3.5–10.8)

## 2018-11-13 PROCEDURE — 82803 BLOOD GASES ANY COMBINATION: CPT

## 2018-11-13 PROCEDURE — 84484 ASSAY OF TROPONIN QUANT: CPT

## 2018-11-13 PROCEDURE — 71045 X-RAY EXAM CHEST 1 VIEW: CPT

## 2018-11-13 PROCEDURE — 87077 CULTURE AEROBIC IDENTIFY: CPT

## 2018-11-13 PROCEDURE — 87205 SMEAR GRAM STAIN: CPT

## 2018-11-13 PROCEDURE — 87040 BLOOD CULTURE FOR BACTERIA: CPT

## 2018-11-13 PROCEDURE — 85014 HEMATOCRIT: CPT

## 2018-11-13 PROCEDURE — 84300 ASSAY OF URINE SODIUM: CPT

## 2018-11-13 PROCEDURE — 80053 COMPREHEN METABOLIC PANEL: CPT

## 2018-11-13 PROCEDURE — 82553 CREATINE MB FRACTION: CPT

## 2018-11-13 PROCEDURE — 85018 HEMOGLOBIN: CPT

## 2018-11-13 PROCEDURE — 85027 COMPLETE CBC AUTOMATED: CPT

## 2018-11-13 PROCEDURE — 81003 URINALYSIS AUTO W/O SCOPE: CPT

## 2018-11-13 PROCEDURE — 86140 C-REACTIVE PROTEIN: CPT

## 2018-11-13 PROCEDURE — 5A1955Z RESPIRATORY VENTILATION, GREATER THAN 96 CONSECUTIVE HOURS: ICD-10-PCS | Performed by: INTERNAL MEDICINE

## 2018-11-13 PROCEDURE — 83735 ASSAY OF MAGNESIUM: CPT

## 2018-11-13 PROCEDURE — 94003 VENT MGMT INPAT SUBQ DAY: CPT

## 2018-11-13 PROCEDURE — 70450 CT HEAD/BRAIN W/O DYE: CPT

## 2018-11-13 PROCEDURE — 84100 ASSAY OF PHOSPHORUS: CPT

## 2018-11-13 PROCEDURE — 80202 ASSAY OF VANCOMYCIN: CPT

## 2018-11-13 PROCEDURE — 83880 ASSAY OF NATRIURETIC PEPTIDE: CPT

## 2018-11-13 PROCEDURE — 83605 ASSAY OF LACTIC ACID: CPT

## 2018-11-13 PROCEDURE — 94640 AIRWAY INHALATION TREATMENT: CPT

## 2018-11-13 PROCEDURE — 81015 MICROSCOPIC EXAM OF URINE: CPT

## 2018-11-13 PROCEDURE — 85610 PROTHROMBIN TIME: CPT

## 2018-11-13 PROCEDURE — 80048 BASIC METABOLIC PNL TOTAL CA: CPT

## 2018-11-13 PROCEDURE — 85025 COMPLETE CBC W/AUTO DIFF WBC: CPT

## 2018-11-13 PROCEDURE — 87070 CULTURE OTHR SPECIMN AEROBIC: CPT

## 2018-11-13 PROCEDURE — 82550 ASSAY OF CK (CPK): CPT

## 2018-11-13 PROCEDURE — 93005 ELECTROCARDIOGRAM TRACING: CPT

## 2018-11-13 PROCEDURE — 87186 SC STD MICRODIL/AGAR DIL: CPT

## 2018-11-13 PROCEDURE — 31622 DX BRONCHOSCOPE/WASH: CPT

## 2018-11-13 PROCEDURE — 85730 THROMBOPLASTIN TIME PARTIAL: CPT

## 2018-11-13 PROCEDURE — 36415 COLL VENOUS BLD VENIPUNCTURE: CPT

## 2018-11-13 PROCEDURE — 87086 URINE CULTURE/COLONY COUNT: CPT

## 2018-11-13 PROCEDURE — 99285 EMERGENCY DEPT VISIT HI MDM: CPT

## 2018-11-13 PROCEDURE — 36600 WITHDRAWAL OF ARTERIAL BLOOD: CPT

## 2018-11-13 NOTE — ED
Shortness of Breath





- HPI Summary


HPI Summary: 


This patient is a 68 year old M presenting to [facility] BIBA from [facility] with a chief complaint of respiratory distress since 2000. Pt was found 

unresponsive (baseline a/o) by nursing home staff, one hour after he was 

checked on and well-appearing. Pt was found lying on his left side, drooling, 

and gasping for air. Pt O2 sat at 94 % on non-rebreather mask. Pt is 

unresponsive, even with sternal rub. PMHx pacemaker, CHF, morbid obesity. Level 

5 caveat: Full HPI unobtainable secondary to pt extremis.





- History of Current Complaint


Hx Obtained From: EMS


Hx From Patient Unobtainable Due To: Extremis


Onset/Duration: Still Present


Timing: Constant


Current Severity: Severe


Dyspnea At: Rest


Aggrevating Factors: Nothing


Alleviating Factors: Oxygen


Related History: Obesity, Healthcare Acquired Pneumonia: Lives In Nursing Home





- Allergy/Home Medications


Allergies/Adverse Reactions: 


 Allergies











Allergy/AdvReac Type Severity Reaction Status Date / Time


 


No Known Allergies Allergy   Verified 03/28/18 13:25














PMH/Surg Hx/FS Hx/Imm Hx


Endocrine/Hematology History: 


   Denies: Hx Sickle Cell Disease


Cardiovascular History: Reports: Hx Congestive Heart Failure, Hx Hypertension, 

Hx Pacemaker/ICD


Respiratory History: Reports: Hx Pneumonia, Hx Pulmonary Edema, Hx Sleep Apnea, 

Other Respiratory Problems/Disorders - BRONCHOSCOPY 10/3/18.


   Denies: Hx Asthma, Hx Chronic Obstructive Pulmonary Disease (COPD)


 History: Reports: Hx Benign Prostatic Hyperplasia, Hx Chronic Renal Failure, 

Hx Kidney Infection, Other  Problems/Disorders - history MRSA in urine


Musculoskeletal History: Reports: Hx Arthritis - knee, Other Musculoskeletal 

History - morbid obesity


Sensory History: Reports: Hx Hearing Problem - mild [place]


   Denies: Hx Contacts or Glasses, Hx Hearing Aid


Opthamlomology History: 


   Denies: Hx Contacts or Glasses


Neurological History: Reports: Hx CVA


Psychiatric History: 


   Denies: Hx Panic Disorder





- Surgical History


Surgery Procedure, Year, and Place: HERNIA REPAIR, AICD/PACER


Hx Anesthesia Reactions: No





- Immunization History


Date of Tetanus Vaccine: up to date


Date of Influenza Vaccine: 2016


Infectious Disease History: Reports: Hx of Known/Suspected MRSA - mrsa neg as 

of f4/30/17


   Denies: Hx Clostridium Difficile





- Family History


Known Family History: Positive: Other - Stroke


Family History: FHx of CA.  No FHx of CVA





- Social History


Occupation: Disabled


Alcohol Use: None


Hx Substance Use: No


Substance Use Type: Reports: None


Hx Tobacco Use: No


Smoking Status (MU): Former Smoker


Type: Cigarettes


Amount Used/How Often: 1 pack per week


Length of Time of Smoking/Using Tobacco: 1 year


Have You Smoked in the Last Year: No





Review of Systems





- ROS Summary


Review of Systems Summary: 


Level 5 caveat: Full ROS limited secondary to pt's extremis.


Negative: Fever


Positive: Shortness Of Breath


Positive: no symptoms reported


Neurological: Other - unresponsive


All Other Systems Reviewed And Are Negative: No





Physical Exam





- Summary


Physical Exam Summary: 


VITAL SIGNS: Reviewed.


GENERAL:  Patient is a morbidly obese male who is lying comfortable in the 

stretcher. Pt is drooling, unresponsive.


HEAD AND FACE: No signs of trauma. No ecchymosis, hematomas or skull 

depressions. No sinus tenderness.


EYES: PERRLA, EOMI x 2, No injected conjunctiva, no nystagmus.


EARS: Ear canals and tympanic membranes are within normal limits.


MOUTH: Oropharynx within normal limits.


NECK: Unable to obtain pulses or see veins due to morbid obesity


CHEST: Symmetric, no tenderness at palpation


LUNGS: Agonal breathing, RR 10, decreased breath sounds with rhonchi 

bilaterally.


CVS: Distant heart sounds


ABDOMEN: Soft, non-tender. Obese abd. No rebound, no guarding, and no masses 

palpated. Bowel sounds are normal.


EXTREMITIES: FROM in all major joints, bilateral pedal edema, no cyanosis or 

clubbing.


NEURO: Pt is unresponsive to pain


SKIN: Dry and warm


Triage Information Reviewed: Yes


Vital Signs Reviewed: Yes


Completion Of Physical Exam Limited Due To: Extremis





- [place] Coma Scale


Best Eye Response: 1 - None


Best Verbal Response: 1 - None





Procedures





- Intubation


Time of Intubation: 21:04


Intubation Method: orotracheal


Tube Size (cm): 7.5


Medications: Succinylcholine - and atomidate


Breath Sounds after Intubation: equal


Intubation Complications: no complications


Post Intubation Xray: Yes





Diagnostics





- Laboratory


Result Diagrams: 


 11/13/18 21:00





 11/13/18 21:00


Lab Statement: Any lab studies that have been ordered have been reviewed, and 

results considered in the medical decision making process.





- Radiology


  ** CXR


Radiology Interpretation Completed By: ED Physician


Summary of Radiographic Findings: Under-penetration, cardiomegaly, poor 

penetration, ET tube at level of clavicle, bilateral venous congestion. Pending 

official imaging report.





- EKG


  ** 2215


Cardiac Rate: NL - 70 ventricular paced


Summary of EKG Findings: ventricular paced, no further analysis attempted.





Course/Dx





- Course


Course Of Treatment: A 68-year-old M presents to the ED with a CC of 

respiratory distress for 1 hour. (+) unresponsive, drooling, gasping. Pt is A/O 

at baseline. Was found unresponsive by nursing home staff lying on his left side

, gasping for air, and unresponsive. Pt in room O2 sat is 94% on a non-

rebreather mask. PMHx morbid obesity, chronic respiratory failure. A CXR 

reveals under-penetration, cardiomegaly, poor penetration, ET tube at level of 

clavicle, bilateral venous congestion.. An EKG reveals ___. In the ED course, 

pt was given fentanyl, versed, nl saline, piperacillin/tazobactam. Pt labs show 

Low H&H, low MCH, low MCHC, high RDW, high neut %, low lymph %, low abs lymphs, 

high INR, high APTT, high K+, low Cl-, high CO2, high BUN ,high creatinine, 

high BUN/creat ratio, high glucose, high alkaline phosphatase, high CRP, high 

BNP, low albumin, high globulin, low albumin/globulin ratio, ABG: pH 7.26, pCO2 

77, pO2 224, O2 sat 99.6, base xs 6.0.  Pt intubated at 2104. Pt intubated 

immediately upon arrival to ED to protect airways.





- Diagnoses


Provider Diagnoses: 


 Respiratory failure, CHF (congestive heart failure), Morbid obesity, 

Respiratory acidosis








- Physician Notifications


Discussed Care of Patient With: Sangeeta Breen


Time Discussed With Above Provider: 22:17


Instructed by Provider To: Other - Accepts admission.





- Critical Care Time


Critical Care Time: 30-74 min - 50





Discharge





- Sign-Out/Discharge


Documenting (check all that apply): Patient Departure - admit





- Discharge Plan


Condition: Guarded


Disposition: ADMITTED TO [facility]





- Attestation Statements


Document Initiated by Scribe: Yes


Documenting Scribe: Dharmesh Madison


Provider For Whom Scribe is Documenting (Include Credential): Dr. Puja Flowers MD


Scribe Attestation: 


I, Dharmesh Bezirganian, scribed for Dr. Puja Flowers MD on 11/13/18 at 2301.

## 2018-11-14 LAB
BASOPHILS # BLD AUTO: 0.1 10^3/UL (ref 0–0.2)
EOSINOPHIL # BLD AUTO: 0 10^3/UL (ref 0–0.6)
HCT VFR BLD AUTO: 28 % (ref 42–52)
HCT VFR BLD AUTO: 30 % (ref 42–52)
HGB BLD-MCNC: 8.6 G/DL (ref 14–18)
HGB BLD-MCNC: 8.9 G/DL (ref 14–18)
LYMPHOCYTES # BLD AUTO: 0.3 10^3/UL (ref 1–4.8)
MCH RBC QN AUTO: 25 PG (ref 27–31)
MCHC RBC AUTO-ENTMCNC: 31 G/DL (ref 31–36)
MCV RBC AUTO: 81 FL (ref 80–94)
MONOCYTES # BLD AUTO: 0.4 10^3/UL (ref 0–0.8)
NEUTROPHILS # BLD AUTO: 6.4 10^3/UL (ref 1.5–7.7)
NRBC # BLD AUTO: 0 10^3/UL
NRBC BLD QL AUTO: 0.1
PLATELET # BLD AUTO: 192 10^3/UL (ref 150–450)
RBC # BLD AUTO: 3.43 10^6/UL (ref 4–5.4)
RBC UR QL AUTO: (no result)
WBC # BLD AUTO: 7.2 10^3/UL (ref 3.5–10.8)
WBC UR QL AUTO: (no result)

## 2018-11-14 PROCEDURE — 05HM33Z INSERTION OF INFUSION DEVICE INTO RIGHT INTERNAL JUGULAR VEIN, PERCUTANEOUS APPROACH: ICD-10-PCS | Performed by: INTERNAL MEDICINE

## 2018-11-14 PROCEDURE — B543ZZA ULTRASONOGRAPHY OF RIGHT JUGULAR VEINS, GUIDANCE: ICD-10-PCS | Performed by: INTERNAL MEDICINE

## 2018-11-14 RX ADMIN — CHLORHEXIDINE GLUCONATE 0.12% ORAL RINSE SCH ML: 1.2 LIQUID ORAL at 09:23

## 2018-11-14 RX ADMIN — FENTANYL CITRATE PRN MCG: 0.05 INJECTION, SOLUTION INTRAMUSCULAR; INTRAVENOUS at 23:48

## 2018-11-14 RX ADMIN — CHLORHEXIDINE GLUCONATE 0.12% ORAL RINSE SCH ML: 1.2 LIQUID ORAL at 05:33

## 2018-11-14 RX ADMIN — NOREPINEPHRINE BITARTRATE SCH MLS/HR: 1 INJECTION INTRAVENOUS at 23:45

## 2018-11-14 RX ADMIN — CHLORHEXIDINE GLUCONATE 0.12% ORAL RINSE SCH: 1.2 LIQUID ORAL at 05:35

## 2018-11-14 RX ADMIN — FENTANYL CITRATE PRN MCG: 0.05 INJECTION, SOLUTION INTRAMUSCULAR; INTRAVENOUS at 15:31

## 2018-11-14 RX ADMIN — MOMETASONE FUROATE AND FORMOTEROL FUMARATE DIHYDRATE SCH: 200; 5 AEROSOL RESPIRATORY (INHALATION) at 19:35

## 2018-11-14 RX ADMIN — CHLORHEXIDINE GLUCONATE 0.12% ORAL RINSE SCH ML: 1.2 LIQUID ORAL at 20:13

## 2018-11-14 RX ADMIN — NOREPINEPHRINE BITARTRATE SCH MLS/HR: 1 INJECTION INTRAVENOUS at 14:22

## 2018-11-14 RX ADMIN — CHLORHEXIDINE GLUCONATE 0.12% ORAL RINSE SCH ML: 1.2 LIQUID ORAL at 14:15

## 2018-11-14 RX ADMIN — TAMSULOSIN HYDROCHLORIDE SCH MG: 0.4 CAPSULE ORAL at 20:13

## 2018-11-14 RX ADMIN — NYSTATIN SCH APPLIC: 100000 POWDER TOPICAL at 20:23

## 2018-11-14 RX ADMIN — PROPOFOL SCH MLS/HR: 10 INJECTION, EMULSION INTRAVENOUS at 05:35

## 2018-11-14 RX ADMIN — MOMETASONE FUROATE AND FORMOTEROL FUMARATE DIHYDRATE SCH: 200; 5 AEROSOL RESPIRATORY (INHALATION) at 12:54

## 2018-11-14 RX ADMIN — PROPOFOL SCH MLS/HR: 10 INJECTION, EMULSION INTRAVENOUS at 09:09

## 2018-11-14 RX ADMIN — CHLORHEXIDINE GLUCONATE 0.12% ORAL RINSE SCH ML: 1.2 LIQUID ORAL at 23:47

## 2018-11-14 RX ADMIN — NYSTATIN SCH APPLIC: 100000 POWDER TOPICAL at 13:42

## 2018-11-14 RX ADMIN — FENTANYL CITRATE PRN MCG: 0.05 INJECTION, SOLUTION INTRAMUSCULAR; INTRAVENOUS at 20:14

## 2018-11-14 RX ADMIN — PROPOFOL SCH MLS/HR: 10 INJECTION, EMULSION INTRAVENOUS at 17:57

## 2018-11-14 RX ADMIN — CHLORHEXIDINE GLUCONATE 0.12% ORAL RINSE SCH ML: 1.2 LIQUID ORAL at 16:30

## 2018-11-14 RX ADMIN — PROPOFOL SCH MLS/HR: 10 INJECTION, EMULSION INTRAVENOUS at 12:21

## 2018-11-14 RX ADMIN — PROPOFOL SCH MLS/HR: 10 INJECTION, EMULSION INTRAVENOUS at 21:57

## 2018-11-14 RX ADMIN — FENTANYL CITRATE PRN MCG: 0.05 INJECTION, SOLUTION INTRAMUSCULAR; INTRAVENOUS at 17:49

## 2018-11-14 RX ADMIN — NYSTATIN SCH APPLIC: 100000 POWDER TOPICAL at 00:45

## 2018-11-14 RX ADMIN — NYSTATIN SCH APPLIC: 100000 POWDER TOPICAL at 09:24

## 2018-11-14 NOTE — HP
CC:  [facility] *

 

HISTORY AND PHYSICAL:

 

DATE OF ADMISSION:  18

 

TIME OF EVALUATION:  2200

 

PRIMARY CARE PHYSICIAN:  [facility].

 

CHIEF COMPLAINT:  Altered mental status, somnolence.

 

HISTORY OF PRESENT ILLNESS:  History was obtained by the [facility] report and 
the ER staff.  The patient apparently was found unresponsive.  He was promptly 
brought into the emergency room via EMS.  He was unresponsive, drooling, 
respiratory rate from 8 to 10.  He did move his extremities to painful stimuli, 
but he was promptly intubated and sedated.  The patient just had a prolonged 
hospitalization here at [facility] from 10/02/18 to 10/17/18, where he had a similar 
presentation with unresponsive episode secondary to hypercapnic respiratory 
failure and a question of a stroke.  I did speak to the brother who is the 
healthcare proxy, Nicolas, who states now he had been doing well since his 
discharge back at [facility], although they were going to try to get him in a 
motorized wheelchair, they do not think they will be able to find one big 
enough for him and unfortunately has been bed bound since then.  Unable to 
obtain review of systems due to the patient's altered mental status and 
sedation.  There is a question of the patient being found in emesis as well, 
but there is no clear documentation that he did vomit.

 

PAST MEDICAL HISTORY:

1.  Hospitalization 10/02/18 to 10/17/18 secondary to hypercapnic respiratory 
failure, requiring intubation.

2.  History of morbid obesity with obesity hyperventilation syndrome.

3.  Chronic atrial fibrillation.  Does not appear that he is on anticoagulation 
any longer.

4.  History of chronic systolic congestive heart failure.

5.  Constipation.

6.  Dysarthria, following nontraumatic intracranial hemorrhage.

7.  History of CKD stage 4.

8.  Hemiplegia and hemiparesis secondary to CVA.

9.  COPD on 3 L.

10.  History of MRSA.

11.  Hypertension.

12.  BPH with indwelling Vallecillo catheter.

13.  History of VRE bacteremia.

14.  Obstructive sleep apnea, on BiPAP.  Though after speaking with the brother
, he states he may be noncompliant with this.

15.  History of pacemaker, AICD placement.

16.  History of pressure ulcers sacrum and upper extremities.

 

MEDICATIONS:

1.  Oxycodone 5 mg q.6 hours as needed.

2.  Torsemide 40 mg in the evening.

3.  Flomax 0.4 mg p.o. at bedtime.

4.  Spironolactone 12.5 mg p.o. daily.

5.  Feet anemia as needed.

6.  Senokot 2 tabs p.o. daily.

7.  Milk of Mag 30 mL as needed.

8.  Ipratropium q.6 hours as needed.

9.  Advair 250-50 one puff inhaled b.i.d.

10.  Proscar 5 mg p.o. daily.

11.  Vitamin D 2000 units p.o. in the morning.

12.  Suppository as needed.

13.  Atorvastatin 20 mg p.o. daily.

14.  Aspirin 81 mg p.o. daily.

15.  Albuterol 2.5 inhaled q.2 p.r.n.

16.  Tylenol as needed.

 

ALLERGIES:  No known drug allergies.

 

FAMILY HISTORY:  Mother  from colon cancer.  Father  from lung 
cancer.

 

SOCIAL HISTORY:  As mentioned, the patient is at [facility]. He 
is bedbound secondary to his morbid obesity.  He is a [place] .  His 
brother Nicolas Culver, is his healthcare proxy and did confirm that he is a full 
code.  I discussed this again.  He said if he does make it through this then he 
is going to readdress this which is highly recommended due to his significant 
comorbidities. He has remote history of smoking and alcohol abuse.  He has 
never been .  No children.

 

CODE STATUS:  He remains full code.

 

REVIEW OF SYSTEMS:  Unable to obtain due to patient's intubation and sedation.

 

                               PHYSICAL EXAMINATION

 

GENERAL:  The patient is intubated and sedated.  He is morbidly obese.

 

VITAL SIGNS:  Temp is 96.2, pulse rate is 70, respiratory rate is 16, oxygen 
saturation is 98% on mechanical ventilation, blood pressure is 124/60.

 

HEENT:  Head, normocephalic.  Pupils are pinpoint and sluggish.  His 
conjunctivae are injected.  Oropharynx, the patient has an ET tube and OG tube 
in place.

 

RESPIRATORY:  Diminished breath sounds, rhonchi bilaterally.

 

CARDIAC:  Distant heart sounds.  Regular rate and rhythm, systolic murmur.

 

ABDOMEN:  Morbidly obese, soft, nontender.

 

EXTREMITIES:  +2 pretibial edema.

 

DERM:  The patient with skin breakdown with erythema in intertriginous regions.
  He has scattered ecchymosis as well.  Indwelling Vallecillo with dark brown color.

 

 LABORATORY DATA:  White count 7.9, hemoglobin 10.1, hematocrit 33, MCV is 207, 
INR is 1.73.  Blood gas:  PH 7.26, pCO2 77, pO2 224.  Sodium 137, potassium 5.4
, chloride 94, bicarb 34, , creatinine 4.5, glucose 125.  , 
albumin is 3.1, BNP is 340.  UA is pending.

 

RADIOGRAPHIC DATA:  His chest x-ray is a poor underexposed film due to his 
morbid obesity shows pulmonary edema bilaterally.  ET tube in appropriate 
position.  EKG shows a ventricular paced.

 

ASSESSMENT:  This is a 68-year-old male with the past medical history of 
morbidly obese and chronic hypercapnic respiratory failure on BiPAP at bedtime 
who presented to emergency room from [facility], found unresponsive.

 

1.  Unresponsive.  

Assessment:  I suspect systemic inflammatory response syndrome secondary to 
most likely urinary tract infection contributing him to go into acute 
hypercapnic respiratory failure requiring intubation.  The other concern is 
elevated BUN and creatinine with worsening renal failure and possibility of 
gastrointestinal bleed as well.  No evidence of bloody or black stools, based 
on nursing report.  Question of noncompliance with his BIPAP at [facility]. 



Plan:  We will hold off on any further fluids.  He did get a liter of normal 
saline in the emergency department.  We will continue him on Zosyn to cover him 
for urinary tract infection and possible aspiration pneumonia.  We will add 
vancomycin as he does have a history of enterococcus UTI in the past.  We will 
switch his sedation to propofol and fentanyl as needed.  We will place him on a 
proton pump inhibitor drip.  No further IVFs in setting of CHF. Repeat an H and 
H in a few hours and transfuse as indicated.  Adjust vent settings and repeat 
his blood gas to show improvement in his pCO2.  I will also get head CT to rule 
any intracranial pathology and hold his aspirin until the report is back.  
Recommend touching base with [facility] to see if he is noncompliant with BiPAP.
  



2.  Chronic medical problems.  

Benign prostatic hypertrophy with indwelling Vallecillo: As mentioned we will change 
out his Vallecillo catheter.  Continue with 

Flomax.  We will hold his Proscar for now.



Chronic obstructive pulmonary disease.  Continue his inhaler regimen.



Heart failure.  I will hold his spironolactone as he has worsening hyperkalemia 
and renal failure.  Hold the Demadex for now in the setting of systemic 
inflammatory response syndrome.



6.  Fluids, electrolytes, and nutrition:  NPO  OG tube in place.  In the 
setting of his acute decompensated heart failure, we will hold IV fluids.



7.  DVT prophylaxis:  Awaiting head CT.  We will order SCDs for now.



8.  Code status for now is full code.  The brother who is the healthcare proxy 
agreed to readdress this if he does make it through.  I talked about the poor 
prognosis and high recurrence of this occurring again.

 

TIME SPENT:  Greater than 60 minutes spent doing history and physical, more 
than half the time spent in direct patient contact and critical care time.  I 
did also speak with Dr. Sanford regarding this patient's admission.

 

[id number]/CPS #: [id number]

NICOLAS

## 2018-11-14 NOTE — OP
Operative Report - Blank





- Operative Report


Date of Operation: 11/14/18


Note: 





Central Venous Catheter (CVC, Central Line) Placement


Date: 11/14/18


Time: 2p


Indication: Hemodynamic monitoring/Intravenous access


Attending: Mitzy CONTRERAS time-out was completed verifying correct patient, procedure, site, positioning

, and special equipment if applicable. The patient was placed in a dependent 

position appropriate for central line placement based on the vein to be 

cannulated. The patients right neck was prepped and draped in sterile fashion. 

1% Lidocaine was used to anesthetize the surrounding skin area. A triple lumen 7

-[place] catheter was introduced into the the internal jugular vein using the 

Seldinger technique and under ultrasound guidance. The catheter was threaded 

smoothly over the guide wire and appropriate blood return was obtained. Each 

lumen of the catheter was evacuated of air and flushed with sterile saline. The 

catheter was then sutured in place to the skin and a sterile dressing applied. 

Perfusion to the extremity distal to the point of catheter insertion was 

checked and found to be adequate. 


Estimated Blood Loss: none


The patient tolerated the procedure well and there were no complications.

## 2018-11-14 NOTE — PN
Date of Service: 11/14/18


Critical Care Services: 





68M with htn, hld, ckd, afib, chf s/p aicd, copd, hi/ohs, bph with chronic 

indwelling alexandre, recurrent UTI, presents with hypercapnic respiratory failure. 

Intubated in the ER.





11/14: remains intubated.





Vital Signs: 











Temp Pulse Resp BP SpO2 FiO2


 


97.2 F 70 24 95/61 95 60


 


11/14/18 08:01 11/14/18 08:01 11/14/18 08:00 11/14/18 08:00 11/14/18 08:01 11/14 /18 08:00











Physical Exam: 





Gen -  Intubated and sedated


HEENT - nact, perrl


neck  - unable to assess jvd, no thyromegaly


cv - s1/s2, no murmur


lung - dec bs bilaterally


abd - soft, nt


ext - no cce


neuro - unresponisve





Fluid Balance (Past 24 Hours): 


I=     O=     Net 


 Intake & Output











 11/12/18 11/13/18 11/14/18 11/15/18





 06:59 06:59 06:59 06:59


 


Intake Total   2053 


 


Output Total   370 140


 


Balance   1683 -140


 


Weight   273 kg 


 


Intake:    


 


  IV Fluids   1102 


 


  IVPB   615 


 


    ABX - VANCOMYCIN   518 


 


    ABX - ZOSYN   97 


 


  Medicated IV   336 


 


    CC - Propofol/Diprivan   159 


 


    GEN - Pantoprazole/   177 





    Protonix    


 


Output:    


 


  Alexandre   370 140





 








Labs: 


 Laboratory Results - last 24 hr











  11/13/18 11/13/18 11/13/18





  21:00 21:00 21:00


 


WBC  7.9  


 


RBC  4.10  


 


Hgb  10.1 L  


 


Hct  33 L  


 


MCV  81  


 


MCH  25 L  


 


MCHC  30 L  


 


RDW  19 H  


 


Plt Count  207  


 


MPV  8.5  


 


Neut % (Auto)  90.2 H  


 


Lymph % (Auto)  4.3 L  


 


Mono % (Auto)  4.4  


 


Eos % (Auto)  0.4  


 


Baso % (Auto)  0.7  


 


Absolute Neuts (auto)  7.2  


 


Absolute Lymphs (auto)  0.3 L  


 


Absolute Monos (auto)  0.4  


 


Absolute Eos (auto)  0  


 


Absolute Basos (auto)  0.1  


 


Absolute Nucleated RBC  0  


 


Nucleated RBC %  0.1  


 


INR (Anticoag Therapy)   1.73 H 


 


APTT   37.3 H 


 


Patient Temperature   


 


ABG pH   


 


ABG pH (Temp Correct)   


 


ABG pCO2   


 


ABG pCO2 (Temp Corrct   


 


ABG pO2   


 


ABG pO2 (Temp Correct   


 


ABG HCO3   


 


ABG O2 Saturation   


 


ABG Base Excess   


 


Respiration Rate   


 


O2 Delivery Device   


 


Ventilator Type   


 


Vent Mode   


 


FiO2   


 


Inspiratory Time   


 


PEEP   


 


Pressure Support   


 


Pressure Control   


 


EPAP   


 


IPAP   


 


BiPAP   


 


Sodium    137


 


Potassium    5.4 H


 


Chloride    94 L


 


Carbon Dioxide    34 H


 


Anion Gap    9


 


BUN    102 H


 


Creatinine    4.50 H


 


Est GFR ( Amer)    15.8


 


Est GFR (Non-Af Amer)    13.1


 


BUN/Creatinine Ratio    22.7 H


 


Glucose    125 H


 


POC Glucose (mg/dL)   


 


Lactic Acid   


 


Calcium    8.6


 


Total Bilirubin    0.40


 


AST    13


 


ALT    11


 


Alkaline Phosphatase    131 H


 


Total Creatine Kinase    129


 


CK-MB (CK-2)    5.5


 


Troponin I    0.03


 


C-Reactive Protein    141.83 H


 


B-Natriuretic Peptide   


 


Total Protein    7.3


 


Albumin    3.1 L


 


Globulin    4.2 H


 


Albumin/Globulin Ratio    0.7 L


 


Urine Color   


 


Urine Appearance   


 


Urine pH   


 


Ur Specific Gravity   


 


Urine Protein   


 


Urine Ketones   


 


Urine Blood   


 


Urine Nitrate   


 


Urine Bilirubin   


 


Urine Urobilinogen   


 


Ur Leukocyte Esterase   


 


Urine WBC (Auto)   


 


Urine RBC (Auto)   


 


Urine Bacteria   


 


Urine Glucose   


 


Urine Ascorbic Acid   














  11/13/18 11/13/18 11/13/18





  21:00 21:00 21:08


 


WBC   


 


RBC   


 


Hgb   


 


Hct   


 


MCV   


 


MCH   


 


MCHC   


 


RDW   


 


Plt Count   


 


MPV   


 


Neut % (Auto)   


 


Lymph % (Auto)   


 


Mono % (Auto)   


 


Eos % (Auto)   


 


Baso % (Auto)   


 


Absolute Neuts (auto)   


 


Absolute Lymphs (auto)   


 


Absolute Monos (auto)   


 


Absolute Eos (auto)   


 


Absolute Basos (auto)   


 


Absolute Nucleated RBC   


 


Nucleated RBC %   


 


INR (Anticoag Therapy)   


 


APTT   


 


Patient Temperature   


 


ABG pH   


 


ABG pH (Temp Correct)   


 


ABG pCO2   


 


ABG pCO2 (Temp Corrct   


 


ABG pO2   


 


ABG pO2 (Temp Correct   


 


ABG HCO3   


 


ABG O2 Saturation   


 


ABG Base Excess   


 


Respiration Rate   


 


O2 Delivery Device   


 


Ventilator Type   


 


Vent Mode   


 


FiO2   


 


Inspiratory Time   


 


PEEP   


 


Pressure Support   


 


Pressure Control   


 


EPAP   


 


IPAP   


 


BiPAP   


 


Sodium   


 


Potassium   


 


Chloride   


 


Carbon Dioxide   


 


Anion Gap   


 


BUN   


 


Creatinine   


 


Est GFR ( Amer)   


 


Est GFR (Non-Af Amer)   


 


BUN/Creatinine Ratio   


 


Glucose   


 


POC Glucose (mg/dL)    173 H


 


Lactic Acid  0.6  


 


Calcium   


 


Total Bilirubin   


 


AST   


 


ALT   


 


Alkaline Phosphatase   


 


Total Creatine Kinase   


 


CK-MB (CK-2)   


 


Troponin I   


 


C-Reactive Protein   


 


B-Natriuretic Peptide   340 H 


 


Total Protein   


 


Albumin   


 


Globulin   


 


Albumin/Globulin Ratio   


 


Urine Color   


 


Urine Appearance   


 


Urine pH   


 


Ur Specific Gravity   


 


Urine Protein   


 


Urine Ketones   


 


Urine Blood   


 


Urine Nitrate   


 


Urine Bilirubin   


 


Urine Urobilinogen   


 


Ur Leukocyte Esterase   


 


Urine WBC (Auto)   


 


Urine RBC (Auto)   


 


Urine Bacteria   


 


Urine Glucose   


 


Urine Ascorbic Acid   














  11/13/18 11/13/18 11/14/18





  22:15 23:00 00:45


 


WBC   


 


RBC   


 


Hgb   


 


Hct   


 


MCV   


 


MCH   


 


MCHC   


 


RDW   


 


Plt Count   


 


MPV   


 


Neut % (Auto)   


 


Lymph % (Auto)   


 


Mono % (Auto)   


 


Eos % (Auto)   


 


Baso % (Auto)   


 


Absolute Neuts (auto)   


 


Absolute Lymphs (auto)   


 


Absolute Monos (auto)   


 


Absolute Eos (auto)   


 


Absolute Basos (auto)   


 


Absolute Nucleated RBC   


 


Nucleated RBC %   


 


INR (Anticoag Therapy)   


 


APTT   


 


Patient Temperature    Not Reportable


 


ABG pH  7.26 L   7.32 L


 


ABG pH (Temp Correct)    Not Reportable


 


ABG pCO2  77 H*   66 H


 


ABG pCO2 (Temp Corrct    Not Reportable


 


ABG pO2  224 H   124 H


 


ABG pO2 (Temp Correct    Not Reportable


 


ABG HCO3  29.6   30.3


 


ABG O2 Saturation  99.6 H   99.0 H


 


ABG Base Excess  6.0 H   6.9 H


 


Respiration Rate    20


 


O2 Delivery Device    vent


 


Ventilator Type    Not Reportable


 


Vent Mode    pcv


 


FiO2    60


 


Inspiratory Time    0.75


 


PEEP    5


 


Pressure Support    Not Reportable


 


Pressure Control    30


 


EPAP    Not Reportable


 


IPAP    Not Reportable


 


BiPAP    Not Reportable


 


Sodium   


 


Potassium   


 


Chloride   


 


Carbon Dioxide   


 


Anion Gap   


 


BUN   


 


Creatinine   


 


Est GFR ( Amer)   


 


Est GFR (Non-Af Amer)   


 


BUN/Creatinine Ratio   


 


Glucose   


 


POC Glucose (mg/dL)   


 


Lactic Acid   


 


Calcium   


 


Total Bilirubin   


 


AST   


 


ALT   


 


Alkaline Phosphatase   


 


Total Creatine Kinase   


 


CK-MB (CK-2)   


 


Troponin I   


 


C-Reactive Protein   


 


B-Natriuretic Peptide   


 


Total Protein   


 


Albumin   


 


Globulin   


 


Albumin/Globulin Ratio   


 


Urine Color   Yellow 


 


Urine Appearance   Turbid 


 


Urine pH   7.0 


 


Ur Specific Gravity   1.021 


 


Urine Protein   2+(100 mg/dl) A 


 


Urine Ketones   Negative 


 


Urine Blood   2+ A 


 


Urine Nitrate   Negative 


 


Urine Bilirubin   Negative 


 


Urine Urobilinogen   Negative 


 


Ur Leukocyte Esterase   1+ A 


 


Urine WBC (Auto)   Trace(0-5/hpf) 


 


Urine RBC (Auto)   Trace(0-2/hpf) 


 


Urine Bacteria   Absent 


 


Urine Glucose   Negative 


 


Urine Ascorbic Acid   * A 














  11/14/18 11/14/18 11/14/18





  01:10 01:10 01:10


 


WBC   


 


RBC   


 


Hgb  8.9 L  


 


Hct  30 L  


 


MCV   


 


MCH   


 


MCHC   


 


RDW   


 


Plt Count   


 


MPV   


 


Neut % (Auto)   


 


Lymph % (Auto)   


 


Mono % (Auto)   


 


Eos % (Auto)   


 


Baso % (Auto)   


 


Absolute Neuts (auto)   


 


Absolute Lymphs (auto)   


 


Absolute Monos (auto)   


 


Absolute Eos (auto)   


 


Absolute Basos (auto)   


 


Absolute Nucleated RBC   


 


Nucleated RBC %   


 


INR (Anticoag Therapy)   


 


APTT   


 


Patient Temperature   


 


ABG pH   


 


ABG pH (Temp Correct)   


 


ABG pCO2   


 


ABG pCO2 (Temp Corrct   


 


ABG pO2   


 


ABG pO2 (Temp Correct   


 


ABG HCO3   


 


ABG O2 Saturation   


 


ABG Base Excess   


 


Respiration Rate   


 


O2 Delivery Device   


 


Ventilator Type   


 


Vent Mode   


 


FiO2   


 


Inspiratory Time   


 


PEEP   


 


Pressure Support   


 


Pressure Control   


 


EPAP   


 


IPAP   


 


BiPAP   


 


Sodium   


 


Potassium   


 


Chloride   


 


Carbon Dioxide   


 


Anion Gap   


 


BUN   


 


Creatinine   


 


Est GFR ( Amer)   


 


Est GFR (Non-Af Amer)   


 


BUN/Creatinine Ratio   


 


Glucose   


 


POC Glucose (mg/dL)   


 


Lactic Acid    0.6


 


Calcium   


 


Total Bilirubin   


 


AST   


 


ALT   


 


Alkaline Phosphatase   


 


Total Creatine Kinase   


 


CK-MB (CK-2)   


 


Troponin I   


 


C-Reactive Protein   


 


B-Natriuretic Peptide   268 H 


 


Total Protein   


 


Albumin   


 


Globulin   


 


Albumin/Globulin Ratio   


 


Urine Color   


 


Urine Appearance   


 


Urine pH   


 


Ur Specific Gravity   


 


Urine Protein   


 


Urine Ketones   


 


Urine Blood   


 


Urine Nitrate   


 


Urine Bilirubin   


 


Urine Urobilinogen   


 


Ur Leukocyte Esterase   


 


Urine WBC (Auto)   


 


Urine RBC (Auto)   


 


Urine Bacteria   


 


Urine Glucose   


 


Urine Ascorbic Acid   














  11/14/18 11/14/18





  05:45 05:45


 


WBC  7.2 


 


RBC  3.43 L 


 


Hgb  8.6 L 


 


Hct  28 L 


 


MCV  81 


 


MCH  25 L 


 


MCHC  31 


 


RDW  18 H 


 


Plt Count  192 


 


MPV  8.0 


 


Neut % (Auto)  88.6 H 


 


Lymph % (Auto)  4.0 L 


 


Mono % (Auto)  5.7 


 


Eos % (Auto)  0.6 


 


Baso % (Auto)  1.1 


 


Absolute Neuts (auto)  6.4 


 


Absolute Lymphs (auto)  0.3 L 


 


Absolute Monos (auto)  0.4 


 


Absolute Eos (auto)  0 


 


Absolute Basos (auto)  0.1 


 


Absolute Nucleated RBC  0 


 


Nucleated RBC %  0.1 


 


INR (Anticoag Therapy)  


 


APTT  


 


Patient Temperature  


 


ABG pH  


 


ABG pH (Temp Correct)  


 


ABG pCO2  


 


ABG pCO2 (Temp Corrct  


 


ABG pO2  


 


ABG pO2 (Temp Correct  


 


ABG HCO3  


 


ABG O2 Saturation  


 


ABG Base Excess  


 


Respiration Rate  


 


O2 Delivery Device  


 


Ventilator Type  


 


Vent Mode  


 


FiO2  


 


Inspiratory Time  


 


PEEP  


 


Pressure Support  


 


Pressure Control  


 


EPAP  


 


IPAP  


 


BiPAP  


 


Sodium   139


 


Potassium   4.8


 


Chloride   98 L


 


Carbon Dioxide   31


 


Anion Gap   10


 


BUN   107 H


 


Creatinine   4.49 H


 


Est GFR ( Amer)   15.8


 


Est GFR (Non-Af Amer)   13.1


 


BUN/Creatinine Ratio   23.8 H


 


Glucose   86


 


POC Glucose (mg/dL)  


 


Lactic Acid  


 


Calcium   8.1 L


 


Total Bilirubin   0.50


 


AST   11 L


 


ALT   8


 


Alkaline Phosphatase   115 H


 


Total Creatine Kinase  


 


CK-MB (CK-2)  


 


Troponin I  


 


C-Reactive Protein  


 


B-Natriuretic Peptide  


 


Total Protein   6.1 L


 


Albumin   2.8 L


 


Globulin   3.3


 


Albumin/Globulin Ratio   0.8 L


 


Urine Color  


 


Urine Appearance  


 


Urine pH  


 


Ur Specific Gravity  


 


Urine Protein  


 


Urine Ketones  


 


Urine Blood  


 


Urine Nitrate  


 


Urine Bilirubin  


 


Urine Urobilinogen  


 


Ur Leukocyte Esterase  


 


Urine WBC (Auto)  


 


Urine RBC (Auto)  


 


Urine Bacteria  


 


Urine Glucose  


 


Urine Ascorbic Acid  











Studies: 





CXR 11/13


IMPRESSION: FINDINGS SUGGESTIVE OF CONGESTIVE HEART FAILURE, RECOMMEND FOLLOW-

UP CHEST


X-RAYS TO RESOLUTION.





CT Head 11/13


Very limited examination secondary to patient positioning, patient body


habitus, presence of portion of shoulder within the field-of-view the


examination, and motion artifact. No definite evidence of midline shift. Cannot


assess for presence of intracranial hemorrhage or gray-white matter


differentiation.


Impression: 





68M with htn, hld, ckd, afib, chf s/p aicd, copd, hi/ohs, bph with chronic 

indwelling alexandre, recurrent UTI, presents with hypercapnic respiratory failure. 

Intubated in the ER.





Plan: 





Neuro - AMS


- 2/2 hypercapnea


- ct poor study





CV - htn, hld, chf, afib


- bp borderline


- c/w ac with coumadin 


- restart torsemide when bp improved


- c/w statin





Pulm - copd, hi/ohs


- c/w home laba/ics


- nebulizers prn


- wean vent


- extubate to bipap when ready





ID - afebrile


- normal wbc count


- ua negative


- will dc abx





GI - ppi





Renal - ckd


- monitor lytes


- strict i/o





Heme - anemia


- monitor for bleeding





Endo - check fs, niss





lines - piv, alexandre





PPx - gi/dvt





Full Code





Family Updated at bedside





Critical Care Time: 55 mins

## 2018-11-15 LAB
BASOPHILS # BLD AUTO: 0.1 10^3/UL (ref 0–0.2)
EOSINOPHIL # BLD AUTO: 0.2 10^3/UL (ref 0–0.6)
HCT VFR BLD AUTO: 31 % (ref 42–52)
HGB BLD-MCNC: 9.7 G/DL (ref 14–18)
INR PPP/BLD: 2.45 (ref 0.77–1.02)
LYMPHOCYTES # BLD AUTO: 0.6 10^3/UL (ref 1–4.8)
MCH RBC QN AUTO: 24 PG (ref 27–31)
MCHC RBC AUTO-ENTMCNC: 31 G/DL (ref 31–36)
MCV RBC AUTO: 78 FL (ref 80–94)
MONOCYTES # BLD AUTO: 0.7 10^3/UL (ref 0–0.8)
NEUTROPHILS # BLD AUTO: 11.4 10^3/UL (ref 1.5–7.7)
NRBC # BLD AUTO: 0 10^3/UL
NRBC BLD QL AUTO: 0.1
PLATELET # BLD AUTO: 289 10^3/UL (ref 150–450)
RBC # BLD AUTO: 4 10^6/UL (ref 4–5.4)
WBC # BLD AUTO: 12.9 10^3/UL (ref 3.5–10.8)

## 2018-11-15 PROCEDURE — 3E033XZ INTRODUCTION OF VASOPRESSOR INTO PERIPHERAL VEIN, PERCUTANEOUS APPROACH: ICD-10-PCS | Performed by: INTERNAL MEDICINE

## 2018-11-15 PROCEDURE — 0BH18EZ INSERTION OF ENDOTRACHEAL AIRWAY INTO TRACHEA, VIA NATURAL OR ARTIFICIAL OPENING ENDOSCOPIC: ICD-10-PCS | Performed by: INTERNAL MEDICINE

## 2018-11-15 RX ADMIN — PROPOFOL SCH MLS/HR: 10 INJECTION, EMULSION INTRAVENOUS at 21:50

## 2018-11-15 RX ADMIN — NYSTATIN SCH APPLIC: 100000 POWDER TOPICAL at 20:07

## 2018-11-15 RX ADMIN — CHLORHEXIDINE GLUCONATE 0.12% ORAL RINSE SCH ML: 1.2 LIQUID ORAL at 05:47

## 2018-11-15 RX ADMIN — LANSOPRAZOLE SCH MG: KIT at 10:13

## 2018-11-15 RX ADMIN — NYSTATIN SCH APPLIC: 100000 POWDER TOPICAL at 15:11

## 2018-11-15 RX ADMIN — NOREPINEPHRINE BITARTRATE SCH MLS/HR: 1 INJECTION INTRAVENOUS at 05:11

## 2018-11-15 RX ADMIN — NOREPINEPHRINE BITARTRATE SCH MLS/HR: 1 INJECTION INTRAVENOUS at 21:50

## 2018-11-15 RX ADMIN — FENTANYL CITRATE PRN MCG: 0.05 INJECTION, SOLUTION INTRAMUSCULAR; INTRAVENOUS at 12:15

## 2018-11-15 RX ADMIN — PROPOFOL SCH MLS/HR: 10 INJECTION, EMULSION INTRAVENOUS at 07:52

## 2018-11-15 RX ADMIN — TAMSULOSIN HYDROCHLORIDE SCH MG: 0.4 CAPSULE ORAL at 21:46

## 2018-11-15 RX ADMIN — PROPOFOL SCH MLS/HR: 10 INJECTION, EMULSION INTRAVENOUS at 15:11

## 2018-11-15 RX ADMIN — NYSTATIN SCH APPLIC: 100000 POWDER TOPICAL at 12:15

## 2018-11-15 RX ADMIN — CHLORHEXIDINE GLUCONATE 0.12% ORAL RINSE SCH ML: 1.2 LIQUID ORAL at 20:07

## 2018-11-15 RX ADMIN — PROPOFOL SCH MLS/HR: 10 INJECTION, EMULSION INTRAVENOUS at 11:39

## 2018-11-15 RX ADMIN — CHLORHEXIDINE GLUCONATE 0.12% ORAL RINSE SCH ML: 1.2 LIQUID ORAL at 12:11

## 2018-11-15 RX ADMIN — TAMSULOSIN HYDROCHLORIDE SCH MG: 0.4 CAPSULE ORAL at 20:07

## 2018-11-15 RX ADMIN — NOREPINEPHRINE BITARTRATE SCH MLS/HR: 1 INJECTION INTRAVENOUS at 10:14

## 2018-11-15 RX ADMIN — PROPOFOL SCH MLS/HR: 10 INJECTION, EMULSION INTRAVENOUS at 01:23

## 2018-11-15 RX ADMIN — PIPERACILLIN AND TAZOBACTAM SCH MLS/HR: 3; .375 INJECTION, POWDER, LYOPHILIZED, FOR SOLUTION INTRAVENOUS; PARENTERAL at 15:13

## 2018-11-15 RX ADMIN — FENTANYL CITRATE PRN MCG: 0.05 INJECTION, SOLUTION INTRAMUSCULAR; INTRAVENOUS at 17:33

## 2018-11-15 RX ADMIN — IPRATROPIUM BROMIDE AND ALBUTEROL SULFATE SCH NEB: .5; 3 SOLUTION RESPIRATORY (INHALATION) at 15:33

## 2018-11-15 RX ADMIN — TAMSULOSIN HYDROCHLORIDE SCH: 0.4 CAPSULE ORAL at 20:30

## 2018-11-15 RX ADMIN — NOREPINEPHRINE BITARTRATE SCH MLS/HR: 1 INJECTION INTRAVENOUS at 16:11

## 2018-11-15 RX ADMIN — CHLORHEXIDINE GLUCONATE 0.12% ORAL RINSE SCH ML: 1.2 LIQUID ORAL at 10:13

## 2018-11-15 RX ADMIN — PROPOFOL SCH MLS/HR: 10 INJECTION, EMULSION INTRAVENOUS at 04:46

## 2018-11-15 RX ADMIN — IPRATROPIUM BROMIDE AND ALBUTEROL SULFATE SCH NEB: .5; 3 SOLUTION RESPIRATORY (INHALATION) at 19:57

## 2018-11-15 RX ADMIN — MOMETASONE FUROATE AND FORMOTEROL FUMARATE DIHYDRATE SCH: 200; 5 AEROSOL RESPIRATORY (INHALATION) at 09:14

## 2018-11-15 RX ADMIN — MOMETASONE FUROATE AND FORMOTEROL FUMARATE DIHYDRATE SCH: 200; 5 AEROSOL RESPIRATORY (INHALATION) at 19:51

## 2018-11-15 NOTE — OP
Operative Report - Blank





- Operative Report


Date of Operation: 11/15/18


Note: 





ICU BRONCHOSCOPY PROCEDURE NOTE 


* : Mitzy


* ANESTHETIC: propofol gtt


* PROCEDURE: Flexible bronchoscopy  


* Position: Supine 


* Intubation site: ETT


* INDICATION: et tube migration and advancement


* FINDINGS: ET tube advanced using bronchoscope. Copious secretions seen 

throughout airway. 


* COMPLICATIONS: None 


* IMPRESSION: mucus plugging. left > right.

## 2018-11-15 NOTE — PN
Date of Service: 11/15/18


Critical Care Services: 





68M with htn, hld, ckd, afib, chf s/p aicd, copd, hi/ohs, bph with chronic 

indwelling alexandre, recurrent UTI, presents with hypercapnic respiratory failure. 

Intubated in the ER.





11/14: remains intubated.


11/15: ET tube migrated out. Advanced via bronchoscope at bedside. Copious 

secretions throughout airway.





Vital Signs: 











Temp Pulse Resp BP SpO2 FiO2


 


99.9 F 50 26 111/44 94 40


 


11/15/18 06:30 11/15/18 06:30 11/15/18 06:00 11/15/18 06:30 11/15/18 06:30 11/15

/18 05:00











Physical Exam: 





Gen -  Intubated and sedated


HEENT - nact, perrl


neck  - unable to assess jvd, no thyromegaly


cv - s1/s2, no murmur


lung - dec bs bilaterally


abd - soft, nt


ext - no cce


neuro - unresponisve





Fluid Balance (Past 24 Hours): 


I=     O=     Net 


 Intake & Output











 11/13/18 11/14/18 11/15/18 11/16/18





 06:59 06:59 06:59 06:59


 


Intake Total  2053 2874 


 


Output Total  370 1527 


 


Balance  1683 1347 


 


Weight  273 kg  


 


Intake:    


 


  IV Fluids  1102 1053 


 


    NS (0.9%)   1053 


 


  IVPB  615 7 


 


    ABX - VANCOMYCIN  518  


 


    ABX - ZOSYN  97  


 


    NS (0.9%)   7 


 


  Medicated IV  336 1409 


 


    CC - Norepinephrine/   580 





    Levophed    


 


    CC - Propofol/Diprivan  159 594 


 


    GEN - Pantoprazole/  177 235 





    Protonix    


 


  Tube Feeding   405 


 


Output:    


 


  Urine   0 


 


  Alexandre  370 1527 





 








Labs: 


 Laboratory Results - last 24 hr











  11/14/18 11/15/18 11/15/18





  15:15 05:00 05:00


 


WBC    12.9 H


 


RBC    4.00


 


Hgb    9.7 L


 


Hct    31 L


 


MCV    78 L


 


MCH    24 L


 


MCHC    31


 


RDW    18 H


 


Plt Count    289


 


MPV    7.7


 


Neut % (Auto)    87.8 H


 


Lymph % (Auto)    4.4 L


 


Mono % (Auto)    5.5


 


Eos % (Auto)    1.6


 


Baso % (Auto)    0.7


 


Absolute Neuts (auto)    11.4 H


 


Absolute Lymphs (auto)    0.6 L


 


Absolute Monos (auto)    0.7


 


Absolute Eos (auto)    0.2


 


Absolute Basos (auto)    0.1


 


Absolute Nucleated RBC    0


 


Nucleated RBC %    0.1


 


INR (Anticoag Therapy)   


 


Patient Temperature   


 


ABG pH   


 


ABG pH (Temp Correct)   


 


ABG pCO2   


 


ABG pCO2 (Temp Corrct   


 


ABG pO2   


 


ABG pO2 (Temp Correct   


 


ABG HCO3   


 


ABG O2 Saturation   


 


ABG Base Excess   


 


VBG pH  7.29 L  


 


VBG pCO2  56 H  


 


VBG pO2  39  


 


VBG HCO3  24.5  


 


VBG O2 Saturation  76.0  


 


VBG Base Excess  -0.1 L  


 


Respiration Rate   


 


Ventilator Type   


 


Vent Mode   


 


FiO2   


 


Inspiratory Time   


 


PEEP   


 


Pressure Support   


 


Pressure Control   


 


EPAP   


 


IPAP   


 


BiPAP   


 


Sodium   139 


 


Potassium   4.3 


 


Chloride   98 L 


 


Carbon Dioxide   31 


 


Anion Gap   10 


 


BUN   109 H 


 


Creatinine   4.28 H 


 


Est GFR ( Amer)   16.7 


 


Est GFR (Non-Af Amer)   13.8 


 


BUN/Creatinine Ratio   25.5 H 


 


Glucose   123 H 


 


Calcium   8.3 L 


 


Phosphorus   5.7 H 


 


Magnesium   2.8 H 














  11/15/18 11/15/18





  05:00 05:35


 


WBC  


 


RBC  


 


Hgb  


 


Hct  


 


MCV  


 


MCH  


 


MCHC  


 


RDW  


 


Plt Count  


 


MPV  


 


Neut % (Auto)  


 


Lymph % (Auto)  


 


Mono % (Auto)  


 


Eos % (Auto)  


 


Baso % (Auto)  


 


Absolute Neuts (auto)  


 


Absolute Lymphs (auto)  


 


Absolute Monos (auto)  


 


Absolute Eos (auto)  


 


Absolute Basos (auto)  


 


Absolute Nucleated RBC  


 


Nucleated RBC %  


 


INR (Anticoag Therapy)  2.45 H 


 


Patient Temperature   Not Reportable


 


ABG pH   7.35


 


ABG pH (Temp Correct)   Not Reportable


 


ABG pCO2   58 H


 


ABG pCO2 (Temp Corrct   Not Reportable


 


ABG pO2   66 L


 


ABG pO2 (Temp Correct   Not Reportable


 


ABG HCO3   28.9


 


ABG O2 Saturation   95.5


 


ABG Base Excess   5.2 H


 


VBG pH  


 


VBG pCO2  


 


VBG pO2  


 


VBG HCO3  


 


VBG O2 Saturation  


 


VBG Base Excess  


 


Respiration Rate   20


 


Ventilator Type   450


 


Vent Mode   Not Reportable


 


FiO2   40


 


Inspiratory Time   0.75


 


PEEP   10


 


Pressure Support   Not Reportable


 


Pressure Control   Not Reportable


 


EPAP   Not Reportable


 


IPAP   Not Reportable


 


BiPAP   Not Reportable


 


Sodium  


 


Potassium  


 


Chloride  


 


Carbon Dioxide  


 


Anion Gap  


 


BUN  


 


Creatinine  


 


Est GFR ( Amer)  


 


Est GFR (Non-Af Amer)  


 


BUN/Creatinine Ratio  


 


Glucose  


 


Calcium  


 


Phosphorus  


 


Magnesium  











Studies: 





CXR 11/13


IMPRESSION: FINDINGS SUGGESTIVE OF CONGESTIVE HEART FAILURE, RECOMMEND FOLLOW-

UP CHEST


X-RAYS TO RESOLUTION.





CT Head 11/13


Very limited examination secondary to patient positioning, patient body


habitus, presence of portion of shoulder within the field-of-view the


examination, and motion artifact. No definite evidence of midline shift. Cannot


assess for presence of intracranial hemorrhage or gray-white matter


differentiation.





CXR 11/15


IMPRESSION:


1. THE ENDOTRACHEAL TUBE TIP APPEARS SLIGHTLY HIGH IN POSITION.


2. BILATERAL INFILTRATES, UNCHANGED.








Impression: 





68M with htn, hld, ckd, afib, chf s/p aicd, copd, hi/ohs, bph with chronic 

indwelling alexandre, recurrent UTI, presents with hypercapnic respiratory failure. 

Intubated in the ER.





Plan: 





Neuro - AMS


- 2/2 hypercapnea +/- uremia


- ct poor study





CV - htn, hld, chf, afib


- c/w ac with coumadin for afib


- bp dropped due to sedation


- started on levophed





Pulm - copd, hi/ohs


- c/w home laba/ics


- nebulizers q4h


- wean vent


- extubate to bipap when ready





ID - afebrile


- wbc up slightly


- ua negative


- gpc in 1/4 blood culture bottles


- restart zosyn





GI - ppi





Renal - ckd, uremia


- monitor lytes


- strict i/o


- if mental status does not improve will discuss with renal about HD for uremia





Heme - anemia


- monitor for bleeding


- hgb stable





Endo - check fs, niss





lines - tlc, alexandre





PPx - gi/dvt





Full Code





Family Updated at bedside





Critical Care Time: 45 mins

## 2018-11-16 LAB
BASOPHILS # BLD AUTO: 0.1 10^3/UL (ref 0–0.2)
EOSINOPHIL # BLD AUTO: 0.2 10^3/UL (ref 0–0.6)
HCT VFR BLD AUTO: 31 % (ref 42–52)
HGB BLD-MCNC: 9.5 G/DL (ref 14–18)
INR PPP/BLD: 2.55 (ref 0.77–1.02)
LYMPHOCYTES # BLD AUTO: 0.8 10^3/UL (ref 1–4.8)
MCH RBC QN AUTO: 24 PG (ref 27–31)
MCHC RBC AUTO-ENTMCNC: 31 G/DL (ref 31–36)
MCV RBC AUTO: 78 FL (ref 80–94)
MONOCYTES # BLD AUTO: 0.6 10^3/UL (ref 0–0.8)
NEUTROPHILS # BLD AUTO: 9 10^3/UL (ref 1.5–7.7)
NRBC # BLD AUTO: 0 10^3/UL
NRBC BLD QL AUTO: 0
PLATELET # BLD AUTO: 263 10^3/UL (ref 150–450)
RBC # BLD AUTO: 3.89 10^6/UL (ref 4–5.4)
WBC # BLD AUTO: 10.8 10^3/UL (ref 3.5–10.8)

## 2018-11-16 RX ADMIN — IPRATROPIUM BROMIDE AND ALBUTEROL SULFATE SCH NEB: .5; 3 SOLUTION RESPIRATORY (INHALATION) at 00:17

## 2018-11-16 RX ADMIN — IPRATROPIUM BROMIDE AND ALBUTEROL SULFATE SCH NEB: .5; 3 SOLUTION RESPIRATORY (INHALATION) at 19:22

## 2018-11-16 RX ADMIN — NYSTATIN SCH APPLIC: 100000 POWDER TOPICAL at 15:10

## 2018-11-16 RX ADMIN — PIPERACILLIN AND TAZOBACTAM SCH MLS/HR: 3; .375 INJECTION, POWDER, LYOPHILIZED, FOR SOLUTION INTRAVENOUS; PARENTERAL at 01:54

## 2018-11-16 RX ADMIN — PROPOFOL SCH MLS/HR: 10 INJECTION, EMULSION INTRAVENOUS at 05:11

## 2018-11-16 RX ADMIN — MOMETASONE FUROATE AND FORMOTEROL FUMARATE DIHYDRATE SCH: 200; 5 AEROSOL RESPIRATORY (INHALATION) at 19:23

## 2018-11-16 RX ADMIN — NOREPINEPHRINE BITARTRATE SCH MLS/HR: 1 INJECTION INTRAVENOUS at 02:52

## 2018-11-16 RX ADMIN — IPRATROPIUM BROMIDE AND ALBUTEROL SULFATE SCH NEB: .5; 3 SOLUTION RESPIRATORY (INHALATION) at 23:07

## 2018-11-16 RX ADMIN — PIPERACILLIN AND TAZOBACTAM SCH MLS/HR: 3; .375 INJECTION, POWDER, LYOPHILIZED, FOR SOLUTION INTRAVENOUS; PARENTERAL at 15:25

## 2018-11-16 RX ADMIN — NOREPINEPHRINE BITARTRATE SCH MLS/HR: 1 INJECTION INTRAVENOUS at 17:49

## 2018-11-16 RX ADMIN — CHLORHEXIDINE GLUCONATE 0.12% ORAL RINSE SCH ML: 1.2 LIQUID ORAL at 01:52

## 2018-11-16 RX ADMIN — IPRATROPIUM BROMIDE AND ALBUTEROL SULFATE SCH NEB: .5; 3 SOLUTION RESPIRATORY (INHALATION) at 02:59

## 2018-11-16 RX ADMIN — CHLORHEXIDINE GLUCONATE 0.12% ORAL RINSE SCH ML: 1.2 LIQUID ORAL at 08:55

## 2018-11-16 RX ADMIN — CHLORHEXIDINE GLUCONATE 0.12% ORAL RINSE SCH ML: 1.2 LIQUID ORAL at 16:37

## 2018-11-16 RX ADMIN — PROPOFOL SCH MLS/HR: 10 INJECTION, EMULSION INTRAVENOUS at 01:53

## 2018-11-16 RX ADMIN — PROPOFOL SCH MLS/HR: 10 INJECTION, EMULSION INTRAVENOUS at 12:24

## 2018-11-16 RX ADMIN — IPRATROPIUM BROMIDE AND ALBUTEROL SULFATE SCH NEB: .5; 3 SOLUTION RESPIRATORY (INHALATION) at 07:15

## 2018-11-16 RX ADMIN — NYSTATIN SCH APPLIC: 100000 POWDER TOPICAL at 09:22

## 2018-11-16 RX ADMIN — PROPOFOL SCH MLS/HR: 10 INJECTION, EMULSION INTRAVENOUS at 08:50

## 2018-11-16 RX ADMIN — CHLORHEXIDINE GLUCONATE 0.12% ORAL RINSE SCH ML: 1.2 LIQUID ORAL at 11:28

## 2018-11-16 RX ADMIN — IPRATROPIUM BROMIDE AND ALBUTEROL SULFATE SCH NEB: .5; 3 SOLUTION RESPIRATORY (INHALATION) at 15:04

## 2018-11-16 RX ADMIN — PROPOFOL SCH MLS/HR: 10 INJECTION, EMULSION INTRAVENOUS at 22:43

## 2018-11-16 RX ADMIN — NYSTATIN SCH APPLIC: 100000 POWDER TOPICAL at 20:43

## 2018-11-16 RX ADMIN — TAMSULOSIN HYDROCHLORIDE SCH MG: 0.4 CAPSULE ORAL at 20:43

## 2018-11-16 RX ADMIN — NOREPINEPHRINE BITARTRATE SCH MLS/HR: 1 INJECTION INTRAVENOUS at 12:23

## 2018-11-16 RX ADMIN — PROPOFOL SCH MLS/HR: 10 INJECTION, EMULSION INTRAVENOUS at 16:10

## 2018-11-16 RX ADMIN — IPRATROPIUM BROMIDE AND ALBUTEROL SULFATE SCH NEB: .5; 3 SOLUTION RESPIRATORY (INHALATION) at 11:15

## 2018-11-16 RX ADMIN — CHLORHEXIDINE GLUCONATE 0.12% ORAL RINSE SCH: 1.2 LIQUID ORAL at 05:28

## 2018-11-16 RX ADMIN — PROPOFOL SCH MLS/HR: 10 INJECTION, EMULSION INTRAVENOUS at 19:00

## 2018-11-16 RX ADMIN — CHLORHEXIDINE GLUCONATE 0.12% ORAL RINSE SCH: 1.2 LIQUID ORAL at 01:53

## 2018-11-16 RX ADMIN — MOMETASONE FUROATE AND FORMOTEROL FUMARATE DIHYDRATE SCH: 200; 5 AEROSOL RESPIRATORY (INHALATION) at 07:43

## 2018-11-16 RX ADMIN — CHLORHEXIDINE GLUCONATE 0.12% ORAL RINSE SCH ML: 1.2 LIQUID ORAL at 20:43

## 2018-11-16 RX ADMIN — LANSOPRAZOLE SCH MG: KIT at 09:22

## 2018-11-16 NOTE — PN
Date of Service: 11/16/18


Critical Care Services: 





68M with htn, hld, ckd, afib, chf s/p aicd, copd, hi/ohs, bph with chronic 

indwelling alexandre, recurrent UTI, presents with hypercapnic respiratory failure. 

Intubated in the ER.





11/14: remains intubated.


11/15: ET tube migrated out. Advanced via bronchoscope at bedside. Copious 

secretions throughout airway.


11/16: Remains intubated. zosyn for proteus in urine. Weaning levophed.








Vital Signs: 











Temp Pulse Resp BP SpO2 FiO2


 


99.3 F 89 22 88/75 95 30


 


11/16/18 06:32 11/16/18 07:18 11/16/18 07:18 11/16/18 06:32 11/16/18 07:18 11/16 /18 04:00











Physical Exam: 





Gen -  Intubated and sedated


HEENT - nact, perrl


neck  - unable to assess jvd, no thyromegaly


cv - s1/s2, no murmur


lung - dec bs bilaterally


abd - soft, nt


ext - no cce


neuro - unresponisve





Fluid Balance (Past 24 Hours): 


I=     O=     Net 


 Intake & Output











 11/14/18 11/15/18 11/16/18 11/17/18





 06:59 06:59 06:59 06:59


 


Intake Total 2053 2874 3644 


 


Output Total 370 1527 2150 


 


Balance 1683 1347 1494 


 


Weight 273 kg   


 


Intake:    


 


  IV Fluids 1102 1053 189 


 


    ABX - ZOSYN   95 


 


    NS (0.9%)  1053 94 


 


  IVPB 615 7 222 


 


    ABX - VANCOMYCIN 518   


 


    ABX - ZOSYN 97   


 


    NS (0.9%)  7 222 


 


  Medicated  1409 1789 


 


    CC - Norepinephrine/  580 1166 





    Levophed    


 


    CC - Propofol/Diprivan 159 594 623 


 


    GEN - Pantoprazole/ 177 235  





    Protonix    


 


  Oral   0 


 


  Tube Feeding  405 1219 


 


  Tube Feeding Flush Amount   100 


 


  Alexandre Irrigate Amount   125 


 


Output:    


 


  Urine  0  


 


  Alexandre 370 1527 2150 





 





Labs: 


 Laboratory Results - last 24 hr











  11/16/18 11/16/18 11/16/18





  05:10 05:10 05:10


 


WBC   10.8 


 


RBC   3.89 L 


 


Hgb   9.5 L 


 


Hct   31 L 


 


MCV   78 L 


 


MCH   24 L 


 


MCHC   31 


 


RDW   18 H 


 


Plt Count   263 


 


MPV   7.3 L 


 


Neut % (Auto)   83.9 H 


 


Lymph % (Auto)   7.0 L 


 


Mono % (Auto)   5.8 


 


Eos % (Auto)   2.1 


 


Baso % (Auto)   1.2 


 


Absolute Neuts (auto)   9.0 H 


 


Absolute Lymphs (auto)   0.8 L 


 


Absolute Monos (auto)   0.6 


 


Absolute Eos (auto)   0.2 


 


Absolute Basos (auto)   0.1 


 


Absolute Nucleated RBC   0 


 


Nucleated RBC %   0 


 


INR (Anticoag Therapy)    2.55 H


 


Sodium  143  


 


Potassium  4.1  


 


Chloride  103  


 


Carbon Dioxide  30  


 


Anion Gap  10  


 


BUN  105 H  


 


Creatinine  3.76 H  


 


Est GFR ( Amer)  19.4  


 


Est GFR (Non-Af Amer)  16.1  


 


BUN/Creatinine Ratio  27.9 H  


 


Glucose  160 H  


 


Calcium  8.4 L  


 


Phosphorus  5.2 H  


 


Magnesium  2.7  











Studies: 





CXR 11/16


IMPRESSION:


1. LIMITED STUDY.


2. FINDINGS MOST CONSISTENT WITH CONGESTIVE HEART FAILURE, UNCHANGED.


3. PROBABLE HERNIA OF A SMALL PORTION OF THE ANTEROLATERAL ASPECT OF THE LEFT 

LUNG,


UNCHANGED.





CXR 11/13


IMPRESSION: FINDINGS SUGGESTIVE OF CONGESTIVE HEART FAILURE, RECOMMEND FOLLOW-

UP CHEST


X-RAYS TO RESOLUTION.





CT Head 11/13


Very limited examination secondary to patient positioning, patient body


habitus, presence of portion of shoulder within the field-of-view the


examination, and motion artifact. No definite evidence of midline shift. Cannot


assess for presence of intracranial hemorrhage or gray-white matter


differentiation.





CXR 11/15


IMPRESSION:


1. THE ENDOTRACHEAL TUBE TIP APPEARS SLIGHTLY HIGH IN POSITION.


2. BILATERAL INFILTRATES, UNCHANGED.





Impression: 





68M with htn, hld, ckd, afib, chf s/p aicd, copd, hi/ohs, bph with chronic 

indwelling alexandre, recurrent UTI, presents with hypercapnic respiratory failure. 

Intubated in the ER.





Plan: 





Neuro - AMS


- 2/2 hypercapnea +/- uremia


- ct poor study





CV - htn, hld, chf, afib


- c/w ac with coumadin for afib


- bp dropped due to sedation


- weaning levophed





Pulm - copd, hi/ohs


- c/w home laba/ics


- nebulizers q4h


- wean vent


- extubate to bipap when ready





ID - afebrile


- proteus in urine


- zosyn for 7 days





GI - ppi





Renal - ckd, uremia


- monitor lytes


- strict i/o


- if mental status does not improve will discuss with renal about HD for uremia





Heme - anemia


- monitor for bleeding


- hgb stable





Endo - check fs, niss





lines - tlc, alexandre





PPx - gi/dvt





Full Code





Critical Care Time: 40 mins

## 2018-11-17 LAB
BASOPHILS # BLD AUTO: 0.1 10^3/UL (ref 0–0.2)
EOSINOPHIL # BLD AUTO: 0.4 10^3/UL (ref 0–0.6)
HCT VFR BLD AUTO: 29 % (ref 42–52)
HGB BLD-MCNC: 9 G/DL (ref 14–18)
INR PPP/BLD: 2.53 (ref 0.77–1.02)
LYMPHOCYTES # BLD AUTO: 0.7 10^3/UL (ref 1–4.8)
MCH RBC QN AUTO: 25 PG (ref 27–31)
MCHC RBC AUTO-ENTMCNC: 32 G/DL (ref 31–36)
MCV RBC AUTO: 79 FL (ref 80–94)
MONOCYTES # BLD AUTO: 0.6 10^3/UL (ref 0–0.8)
NEUTROPHILS # BLD AUTO: 7.8 10^3/UL (ref 1.5–7.7)
NRBC # BLD AUTO: 0 10^3/UL
NRBC BLD QL AUTO: 0.1
PLATELET # BLD AUTO: 254 10^3/UL (ref 150–450)
RBC # BLD AUTO: 3.64 10^6/UL (ref 4–5.4)
WBC # BLD AUTO: 9.6 10^3/UL (ref 3.5–10.8)

## 2018-11-17 RX ADMIN — NOREPINEPHRINE BITARTRATE SCH MLS/HR: 1 INJECTION INTRAVENOUS at 00:43

## 2018-11-17 RX ADMIN — NOREPINEPHRINE BITARTRATE SCH MLS/HR: 1 INJECTION INTRAVENOUS at 07:19

## 2018-11-17 RX ADMIN — CHLORHEXIDINE GLUCONATE 0.12% ORAL RINSE SCH ML: 1.2 LIQUID ORAL at 00:43

## 2018-11-17 RX ADMIN — LANSOPRAZOLE SCH MG: KIT at 08:14

## 2018-11-17 RX ADMIN — CHLORHEXIDINE GLUCONATE 0.12% ORAL RINSE SCH ML: 1.2 LIQUID ORAL at 12:19

## 2018-11-17 RX ADMIN — CHLORHEXIDINE GLUCONATE 0.12% ORAL RINSE SCH ML: 1.2 LIQUID ORAL at 05:29

## 2018-11-17 RX ADMIN — PROPOFOL SCH MLS/HR: 10 INJECTION, EMULSION INTRAVENOUS at 22:46

## 2018-11-17 RX ADMIN — IPRATROPIUM BROMIDE AND ALBUTEROL SULFATE SCH NEB: .5; 3 SOLUTION RESPIRATORY (INHALATION) at 19:11

## 2018-11-17 RX ADMIN — NYSTATIN SCH APPLIC: 100000 POWDER TOPICAL at 21:17

## 2018-11-17 RX ADMIN — IPRATROPIUM BROMIDE AND ALBUTEROL SULFATE SCH NEB: .5; 3 SOLUTION RESPIRATORY (INHALATION) at 23:06

## 2018-11-17 RX ADMIN — PROPOFOL SCH MLS/HR: 10 INJECTION, EMULSION INTRAVENOUS at 08:23

## 2018-11-17 RX ADMIN — MOMETASONE FUROATE AND FORMOTEROL FUMARATE DIHYDRATE SCH: 200; 5 AEROSOL RESPIRATORY (INHALATION) at 19:42

## 2018-11-17 RX ADMIN — CHLORHEXIDINE GLUCONATE 0.12% ORAL RINSE SCH ML: 1.2 LIQUID ORAL at 07:19

## 2018-11-17 RX ADMIN — PIPERACILLIN AND TAZOBACTAM SCH MLS/HR: 3; .375 INJECTION, POWDER, LYOPHILIZED, FOR SOLUTION INTRAVENOUS; PARENTERAL at 02:08

## 2018-11-17 RX ADMIN — NYSTATIN SCH APPLIC: 100000 POWDER TOPICAL at 08:13

## 2018-11-17 RX ADMIN — NYSTATIN SCH APPLIC: 100000 POWDER TOPICAL at 13:51

## 2018-11-17 RX ADMIN — PROPOFOL SCH MLS/HR: 10 INJECTION, EMULSION INTRAVENOUS at 15:20

## 2018-11-17 RX ADMIN — IPRATROPIUM BROMIDE AND ALBUTEROL SULFATE SCH NEB: .5; 3 SOLUTION RESPIRATORY (INHALATION) at 11:55

## 2018-11-17 RX ADMIN — PROPOFOL SCH MLS/HR: 10 INJECTION, EMULSION INTRAVENOUS at 12:07

## 2018-11-17 RX ADMIN — NOREPINEPHRINE BITARTRATE SCH MLS/HR: 1 INJECTION INTRAVENOUS at 23:30

## 2018-11-17 RX ADMIN — IPRATROPIUM BROMIDE AND ALBUTEROL SULFATE SCH NEB: .5; 3 SOLUTION RESPIRATORY (INHALATION) at 14:49

## 2018-11-17 RX ADMIN — TAMSULOSIN HYDROCHLORIDE SCH MG: 0.4 CAPSULE ORAL at 21:17

## 2018-11-17 RX ADMIN — IPRATROPIUM BROMIDE AND ALBUTEROL SULFATE SCH NEB: .5; 3 SOLUTION RESPIRATORY (INHALATION) at 03:36

## 2018-11-17 RX ADMIN — MOMETASONE FUROATE AND FORMOTEROL FUMARATE DIHYDRATE SCH: 200; 5 AEROSOL RESPIRATORY (INHALATION) at 07:19

## 2018-11-17 RX ADMIN — IPRATROPIUM BROMIDE AND ALBUTEROL SULFATE SCH NEB: .5; 3 SOLUTION RESPIRATORY (INHALATION) at 07:51

## 2018-11-17 RX ADMIN — FENTANYL CITRATE PRN MCG: 0.05 INJECTION, SOLUTION INTRAMUSCULAR; INTRAVENOUS at 09:47

## 2018-11-17 RX ADMIN — PIPERACILLIN AND TAZOBACTAM SCH MLS/HR: 3; .375 INJECTION, POWDER, LYOPHILIZED, FOR SOLUTION INTRAVENOUS; PARENTERAL at 13:51

## 2018-11-17 RX ADMIN — NOREPINEPHRINE BITARTRATE SCH MLS/HR: 1 INJECTION INTRAVENOUS at 12:29

## 2018-11-17 RX ADMIN — CHLORHEXIDINE GLUCONATE 0.12% ORAL RINSE SCH ML: 1.2 LIQUID ORAL at 21:17

## 2018-11-17 RX ADMIN — CHLORHEXIDINE GLUCONATE 0.12% ORAL RINSE SCH ML: 1.2 LIQUID ORAL at 16:41

## 2018-11-17 RX ADMIN — PROPOFOL SCH MLS/HR: 10 INJECTION, EMULSION INTRAVENOUS at 01:46

## 2018-11-17 RX ADMIN — NOREPINEPHRINE BITARTRATE SCH MLS/HR: 1 INJECTION INTRAVENOUS at 17:48

## 2018-11-17 RX ADMIN — ACETAMINOPHEN PRN MG: 650 SOLUTION ORAL at 12:19

## 2018-11-17 NOTE — PN
Date of Service: 11/17/18


Critical Care Services: 





68M with htn, hld, ckd, afib, chf s/p aicd, copd, hi/ohs, bph with chronic 

indwelling alexandre, recurrent UTI, presents with hypercapnic respiratory failure. 

Intubated in the ER.





11/14: remains intubated.


11/15: ET tube migrated out. Advanced via bronchoscope at bedside. Copious 

secretions throughout airway.


11/16: Remains intubated. zosyn for proteus in urine. Weaning levophed.


11/17: low grade temp overnight. renal function improving slowly.





Vital Signs: 











Temp Pulse Resp BP SpO2 FiO2


 


100.8 F 77 22 105/50 96 40


 


11/17/18 08:01 11/17/18 08:01 11/17/18 08:00 11/17/18 08:00 11/17/18 08:01 11/17 /18 07:33











Physical Exam: 





Gen -  Intubated and sedated


HEENT - nact, perrl


neck  - unable to assess jvd, no thyromegaly


cv - s1/s2, no murmur


lung - dec bs bilaterally


abd - soft, nt


ext - no cce


neuro - unresponisve








Fluid Balance (Past 24 Hours): 


I=     O=     Net 


 Intake & Output











 11/15/18 11/16/18 11/17/18 11/18/18





 06:59 06:59 06:59 06:59


 


Intake Total 2874 3644 4050 129


 


Output Total 1527 2150 2555 340


 


Balance 1347 1494 1495 -211


 


Weight   235.7 kg 


 


Intake:    


 


  IV Fluids 1053 189 154 


 


    ABX - ZOSYN  95  


 


    NS (0.9%) 1053 94 154 


 


  IVPB 7 222 234 


 


    ABX - ZOSYN   220 


 


    NS (0.9%) 7 222 14 


 


  Medicated IV 1409 1789 2174 


 


    CC - Norepinephrine/ 580 1166 1525 





    Levophed    


 


    CC - Propofol/Diprivan 594 623 649 


 


    GEN - Pantoprazole/ 235   





    Protonix    


 


  Oral  0 0 


 


  Tube Feeding 405 1219 1198 129


 


  Tube Feeding Flush Amount  100 290 


 


  Alexandre Irrigate Amount  125  


 


Output:    


 


  Urine 0   


 


  Alexandre 1527 2150 2555 340





 








Labs: 


 Laboratory Results - last 24 hr











  11/17/18 11/17/18 11/17/18





  05:20 05:20 05:20


 


WBC   9.6 


 


RBC   3.64 L 


 


Hgb   9.0 L 


 


Hct   29 L 


 


MCV   79 L 


 


MCH   25 L 


 


MCHC   32 


 


RDW   18 H 


 


Plt Count   254 


 


MPV   7.2 L 


 


Neut % (Auto)   80.9 


 


Lymph % (Auto)   7.3 L 


 


Mono % (Auto)   6.5 


 


Eos % (Auto)   4.0 


 


Baso % (Auto)   1.3 


 


Absolute Neuts (auto)   7.8 H 


 


Absolute Lymphs (auto)   0.7 L 


 


Absolute Monos (auto)   0.6 


 


Absolute Eos (auto)   0.4 


 


Absolute Basos (auto)   0.1 


 


Absolute Nucleated RBC   0 


 


Nucleated RBC %   0.1 


 


INR (Anticoag Therapy)    2.53 H


 


Sodium  147 H  


 


Potassium  4.4  


 


Chloride  107  


 


Carbon Dioxide  33 H  


 


Anion Gap  7  


 


BUN  105 H  


 


Creatinine  3.67 H  


 


Est GFR ( Amer)  20.0  


 


Est GFR (Non-Af Amer)  16.5  


 


BUN/Creatinine Ratio  28.6 H  


 


Glucose  133 H  


 


Calcium  8.4 L  


 


Phosphorus  5.7 H  


 


Magnesium  3.0 H  











Studies: 





CXR 11/16


IMPRESSION:


1. LIMITED STUDY.


2. FINDINGS MOST CONSISTENT WITH CONGESTIVE HEART FAILURE, UNCHANGED.


3. PROBABLE HERNIA OF A SMALL PORTION OF THE ANTEROLATERAL ASPECT OF THE LEFT 

LUNG,


UNCHANGED.





CXR 11/13


IMPRESSION: FINDINGS SUGGESTIVE OF CONGESTIVE HEART FAILURE, RECOMMEND FOLLOW-

UP CHEST


X-RAYS TO RESOLUTION.





CT Head 11/13


Very limited examination secondary to patient positioning, patient body


habitus, presence of portion of shoulder within the field-of-view the


examination, and motion artifact. No definite evidence of midline shift. Cannot


assess for presence of intracranial hemorrhage or gray-white matter


differentiation.





Impression: 





68M with htn, hld, ckd, afib, chf s/p aicd, copd, hi/ohs, bph with chronic 

indwelling alexandre, recurrent UTI, presents with hypercapnic respiratory failure. 

Intubated in the ER.





Plan: 





Neuro - AMS


- 2/2 hypercapnea +/- uremia


- ct poor study





CV - htn, hld, chf, afib


- c/w ac with coumadin for afib


- bp dropped due to sedation


- weaning levophed





Pulm - copd, hi/ohs


- c/w home laba/ics


- nebulizers q4h


- wean vent


- extubate to bipap when ready





ID - low grade temp overnight


- proteus in urine


- strep mitis in 1/4 blood cultures


- zosyn for 7 days





GI - ppi





Renal - ckd, uremia


- monitor lytes


- strict i/o


- if mental status does not improve will discuss with renal about HD for uremia





Heme - anemia


- monitor for bleeding


- hgb stable





Endo - check fs, niss





lines - tlc, alexandre





PPx - gi/dvt





Full Code





Critical Care Time: 45 mins

## 2018-11-18 LAB
BASOPHILS # BLD AUTO: 0.1 10^3/UL (ref 0–0.2)
EOSINOPHIL # BLD AUTO: 0.4 10^3/UL (ref 0–0.6)
HCT VFR BLD AUTO: 31 % (ref 42–52)
HGB BLD-MCNC: 9.5 G/DL (ref 14–18)
INR PPP/BLD: 2.61 (ref 0.77–1.02)
LYMPHOCYTES # BLD AUTO: 0.6 10^3/UL (ref 1–4.8)
MCH RBC QN AUTO: 25 PG (ref 27–31)
MCHC RBC AUTO-ENTMCNC: 31 G/DL (ref 31–36)
MCV RBC AUTO: 80 FL (ref 80–94)
MONOCYTES # BLD AUTO: 0.7 10^3/UL (ref 0–0.8)
NEUTROPHILS # BLD AUTO: 8.1 10^3/UL (ref 1.5–7.7)
NRBC # BLD AUTO: 0 10^3/UL
NRBC BLD QL AUTO: 0.1
PLATELET # BLD AUTO: 243 10^3/UL (ref 150–450)
RBC # BLD AUTO: 3.86 10^6/UL (ref 4–5.4)
WBC # BLD AUTO: 9.8 10^3/UL (ref 3.5–10.8)

## 2018-11-18 RX ADMIN — PROPOFOL SCH MLS/HR: 10 INJECTION, EMULSION INTRAVENOUS at 23:02

## 2018-11-18 RX ADMIN — NOREPINEPHRINE BITARTRATE SCH MLS/HR: 1 INJECTION INTRAVENOUS at 19:33

## 2018-11-18 RX ADMIN — CHLORHEXIDINE GLUCONATE 0.12% ORAL RINSE SCH ML: 1.2 LIQUID ORAL at 01:29

## 2018-11-18 RX ADMIN — ACETAMINOPHEN PRN MG: 650 SOLUTION ORAL at 00:32

## 2018-11-18 RX ADMIN — PROPOFOL SCH MLS/HR: 10 INJECTION, EMULSION INTRAVENOUS at 02:13

## 2018-11-18 RX ADMIN — NOREPINEPHRINE BITARTRATE SCH MLS/HR: 1 INJECTION INTRAVENOUS at 04:58

## 2018-11-18 RX ADMIN — FENTANYL CITRATE PRN MCG: 0.05 INJECTION, SOLUTION INTRAMUSCULAR; INTRAVENOUS at 11:15

## 2018-11-18 RX ADMIN — FENTANYL CITRATE PRN MCG: 0.05 INJECTION, SOLUTION INTRAMUSCULAR; INTRAVENOUS at 13:53

## 2018-11-18 RX ADMIN — NYSTATIN SCH APPLIC: 100000 POWDER TOPICAL at 08:04

## 2018-11-18 RX ADMIN — MOMETASONE FUROATE AND FORMOTEROL FUMARATE DIHYDRATE SCH: 200; 5 AEROSOL RESPIRATORY (INHALATION) at 19:47

## 2018-11-18 RX ADMIN — IPRATROPIUM BROMIDE AND ALBUTEROL SULFATE SCH NEB: .5; 3 SOLUTION RESPIRATORY (INHALATION) at 11:55

## 2018-11-18 RX ADMIN — IPRATROPIUM BROMIDE AND ALBUTEROL SULFATE SCH NEB: .5; 3 SOLUTION RESPIRATORY (INHALATION) at 19:45

## 2018-11-18 RX ADMIN — NYSTATIN SCH APPLIC: 100000 POWDER TOPICAL at 15:11

## 2018-11-18 RX ADMIN — CHLORHEXIDINE GLUCONATE 0.12% ORAL RINSE SCH ML: 1.2 LIQUID ORAL at 08:04

## 2018-11-18 RX ADMIN — CHLORHEXIDINE GLUCONATE 0.12% ORAL RINSE SCH ML: 1.2 LIQUID ORAL at 16:20

## 2018-11-18 RX ADMIN — PIPERACILLIN AND TAZOBACTAM SCH MLS/HR: 3; .375 INJECTION, POWDER, LYOPHILIZED, FOR SOLUTION INTRAVENOUS; PARENTERAL at 13:54

## 2018-11-18 RX ADMIN — NOREPINEPHRINE BITARTRATE SCH MLS/HR: 1 INJECTION INTRAVENOUS at 23:02

## 2018-11-18 RX ADMIN — IPRATROPIUM BROMIDE AND ALBUTEROL SULFATE SCH: .5; 3 SOLUTION RESPIRATORY (INHALATION) at 05:45

## 2018-11-18 RX ADMIN — PROPOFOL SCH MLS/HR: 10 INJECTION, EMULSION INTRAVENOUS at 10:44

## 2018-11-18 RX ADMIN — IPRATROPIUM BROMIDE AND ALBUTEROL SULFATE SCH NEB: .5; 3 SOLUTION RESPIRATORY (INHALATION) at 11:56

## 2018-11-18 RX ADMIN — CHLORHEXIDINE GLUCONATE 0.12% ORAL RINSE SCH ML: 1.2 LIQUID ORAL at 04:56

## 2018-11-18 RX ADMIN — CHLORHEXIDINE GLUCONATE 0.12% ORAL RINSE SCH ML: 1.2 LIQUID ORAL at 11:16

## 2018-11-18 RX ADMIN — MOMETASONE FUROATE AND FORMOTEROL FUMARATE DIHYDRATE SCH: 200; 5 AEROSOL RESPIRATORY (INHALATION) at 07:08

## 2018-11-18 RX ADMIN — IPRATROPIUM BROMIDE AND ALBUTEROL SULFATE SCH NEB: .5; 3 SOLUTION RESPIRATORY (INHALATION) at 07:29

## 2018-11-18 RX ADMIN — PROPOFOL SCH MLS/HR: 10 INJECTION, EMULSION INTRAVENOUS at 17:17

## 2018-11-18 RX ADMIN — CHLORHEXIDINE GLUCONATE 0.12% ORAL RINSE SCH ML: 1.2 LIQUID ORAL at 19:53

## 2018-11-18 RX ADMIN — LANSOPRAZOLE SCH MG: KIT at 08:04

## 2018-11-18 RX ADMIN — TAMSULOSIN HYDROCHLORIDE SCH MG: 0.4 CAPSULE ORAL at 19:53

## 2018-11-18 RX ADMIN — NYSTATIN SCH APPLIC: 100000 POWDER TOPICAL at 19:53

## 2018-11-18 RX ADMIN — PIPERACILLIN AND TAZOBACTAM SCH MLS/HR: 3; .375 INJECTION, POWDER, LYOPHILIZED, FOR SOLUTION INTRAVENOUS; PARENTERAL at 01:29

## 2018-11-18 NOTE — PN
Date of Service: 11/18/18


Critical Care Services: 





68M with htn, hld, ckd, afib, chf s/p aicd, copd, hi/ohs, bph with chronic 

indwelling alexandre, recurrent UTI, presents with hypercapnic respiratory failure. 

Intubated in the ER.





11/14: remains intubated.


11/15: ET tube migrated out. Advanced via bronchoscope at bedside. Copious 

secretions throughout airway.


11/16: Remains intubated. zosyn for proteus in urine. Weaning levophed.


11/17: low grade temp overnight. renal function improving slowly.


11/18: febrile yesterday. on vanc/zosyn. cxr slighly better. did not tolerate 

sbt today.








Vital Signs: 











Temp Pulse Resp BP SpO2 FiO2


 


100.9 F 80 23 137/60 96 50


 


11/18/18 08:01 11/18/18 08:01 11/18/18 08:00 11/18/18 08:01 11/18/18 08:01 11/18 /18 07:47











Physical Exam: 





Gen -  Intubated and sedated


HEENT - nact, perrl


neck  - unable to assess jvd, no thyromegaly


cv - s1/s2, no murmur


lung - dec bs bilaterally


abd - soft, nt


ext - no cce


neuro - unresponisve





Fluid Balance (Past 24 Hours): 


I=     O=     Net 


 Intake & Output











 11/16/18 11/17/18 11/18/18 11/19/18





 06:59 06:59 06:59 06:59


 


Intake Total 3644 4050 3833 100


 


Output Total 2150 2555 2570 260


 


Balance 1494 1495 1263 -160


 


Weight  235.7 kg 238.5 kg 


 


Intake:    


 


  IV Fluids 189 154 811 


 


    ABX - ZOSYN 95   


 


    NS (0.9%) 94 154 811 


 


  IVPB 222 234 198 


 


    ABX - ZOSYN  220 198 


 


    NS (0.9%) 222 14  


 


  Medicated IV 1789 2174 1750 


 


    CC - Norepinephrine/ 1166 1525 1112 





    Levophed    


 


    CC - Propofol/Diprivan 623 649 638 


 


  Oral 0 0  


 


  Tube Feeding 1219 1198 944 


 


  Tube Feeding Flush Amount 100 290 130 100


 


  Alexandre Irrigate Amount 125   


 


Output:    


 


  Alexandre 2150 2555 2570 260


 


Other:    


 


  Date of Last Bowel   11/18/2018 





  Movement    


 


  # Bowel Movements   1 


 


  Estimated Stool Amount   Large 





 








Labs: 


 Laboratory Results - last 24 hr











  11/18/18 11/18/18 11/18/18





  05:30 05:30 05:30


 


WBC   9.8 


 


RBC   3.86 L 


 


Hgb   9.5 L 


 


Hct   31 L 


 


MCV   80 


 


MCH   25 L 


 


MCHC   31 


 


RDW   19 H 


 


Plt Count   243 


 


MPV   7.2 L 


 


Neut % (Auto)   82.2 


 


Lymph % (Auto)   6.3 L 


 


Mono % (Auto)   6.6 


 


Eos % (Auto)   3.7 


 


Baso % (Auto)   1.2 


 


Absolute Neuts (auto)   8.1 H 


 


Absolute Lymphs (auto)   0.6 L 


 


Absolute Monos (auto)   0.7 


 


Absolute Eos (auto)   0.4 


 


Absolute Basos (auto)   0.1 


 


Absolute Nucleated RBC   0 


 


Nucleated RBC %   0.1 


 


INR (Anticoag Therapy)    2.61 H


 


Sodium  149 H  


 


Potassium  4.2  


 


Chloride  109  


 


Carbon Dioxide  32  


 


Anion Gap  8  


 


BUN  96 H  


 


Creatinine  3.40 H  


 


Est GFR ( Amer)  21.8  


 


Est GFR (Non-Af Amer)  18.0  


 


BUN/Creatinine Ratio  28.2 H  


 


Glucose  139 H  


 


Calcium  8.4 L  


 


Phosphorus  5.0  


 


Magnesium  3.0 H  


 


Random Vancomycin  18.5  











Studies: 





CXR 11/16


IMPRESSION:


1. LIMITED STUDY.


2. FINDINGS MOST CONSISTENT WITH CONGESTIVE HEART FAILURE, UNCHANGED.


3. PROBABLE HERNIA OF A SMALL PORTION OF THE ANTEROLATERAL ASPECT OF THE LEFT 

LUNG,


UNCHANGED.





CXR 11/13


IMPRESSION: FINDINGS SUGGESTIVE OF CONGESTIVE HEART FAILURE, RECOMMEND FOLLOW-

UP CHEST


X-RAYS TO RESOLUTION.





CT Head 11/13


Very limited examination secondary to patient positioning, patient body


habitus, presence of portion of shoulder within the field-of-view the


examination, and motion artifact. No definite evidence of midline shift. Cannot


assess for presence of intracranial hemorrhage or gray-white matter


differentiation.








Impression: 





68M with htn, hld, ckd, afib, chf s/p aicd, copd, hi/ohs, bph with chronic 

indwelling alexandre, recurrent UTI, presents with hypercapnic respiratory failure. 

Intubated in the ER.





Plan: 





Neuro - AMS


- 2/2 hypercapnea +/- uremia


- ct poor study





CV - htn, hld, chf, afib


- c/w ac with coumadin for afib


- bp dropped due to sedation


- weaning levophed





Pulm - copd, hi/ohs


- c/w home laba/ics


- nebulizers q4h


- wean vent


- failed sbt today


- extubate to bipap when ready





ID - hcap?


- proteus in urine


- strep mitis in 1/4 blood cultures


- on vanc/zosyn


- f/u cultures





GI - ppi





Renal - ckd, uremia, hypernatremia


- monitor lytes


- strict i/o


- kidney function slowly improving


- free water added for hypernatremia





Heme - anemia


- monitor for bleeding


- hgb stable





Endo - check fs, niss





lines - tlc, alexandre





PPx - gi/dvt





Full Code





Critical Care Time: 50 mins

## 2018-11-19 LAB
BASOPHILS # BLD AUTO: 0.1 10^3/UL (ref 0–0.2)
EOSINOPHIL # BLD AUTO: 0.4 10^3/UL (ref 0–0.6)
HCT VFR BLD AUTO: 28 % (ref 42–52)
HGB BLD-MCNC: 8.6 G/DL (ref 14–18)
INR PPP/BLD: 2.44 (ref 0.77–1.02)
LYMPHOCYTES # BLD AUTO: 0.7 10^3/UL (ref 1–4.8)
MCH RBC QN AUTO: 25 PG (ref 27–31)
MCHC RBC AUTO-ENTMCNC: 31 G/DL (ref 31–36)
MCV RBC AUTO: 80 FL (ref 80–94)
MONOCYTES # BLD AUTO: 0.6 10^3/UL (ref 0–0.8)
NEUTROPHILS # BLD AUTO: 7.2 10^3/UL (ref 1.5–7.7)
NRBC # BLD AUTO: 0 10^3/UL
NRBC BLD QL AUTO: 0
PLATELET # BLD AUTO: 186 10^3/UL (ref 150–450)
RBC # BLD AUTO: 3.49 10^6/UL (ref 4–5.4)
WBC # BLD AUTO: 9 10^3/UL (ref 3.5–10.8)

## 2018-11-19 RX ADMIN — IPRATROPIUM BROMIDE AND ALBUTEROL SULFATE SCH NEB: .5; 3 SOLUTION RESPIRATORY (INHALATION) at 12:49

## 2018-11-19 RX ADMIN — PROPOFOL SCH MLS/HR: 10 INJECTION, EMULSION INTRAVENOUS at 20:24

## 2018-11-19 RX ADMIN — NOREPINEPHRINE BITARTRATE SCH MLS/HR: 1 INJECTION INTRAVENOUS at 20:24

## 2018-11-19 RX ADMIN — CHLORHEXIDINE GLUCONATE 0.12% ORAL RINSE SCH ML: 1.2 LIQUID ORAL at 08:45

## 2018-11-19 RX ADMIN — NYSTATIN SCH APPLIC: 100000 POWDER TOPICAL at 15:11

## 2018-11-19 RX ADMIN — CHLORHEXIDINE GLUCONATE 0.12% ORAL RINSE SCH ML: 1.2 LIQUID ORAL at 00:05

## 2018-11-19 RX ADMIN — PIPERACILLIN AND TAZOBACTAM SCH MLS/HR: 3; .375 INJECTION, POWDER, LYOPHILIZED, FOR SOLUTION INTRAVENOUS; PARENTERAL at 02:02

## 2018-11-19 RX ADMIN — NYSTATIN SCH APPLIC: 100000 POWDER TOPICAL at 09:01

## 2018-11-19 RX ADMIN — NOREPINEPHRINE BITARTRATE SCH MLS/HR: 1 INJECTION INTRAVENOUS at 19:59

## 2018-11-19 RX ADMIN — CHLORHEXIDINE GLUCONATE 0.12% ORAL RINSE SCH ML: 1.2 LIQUID ORAL at 03:24

## 2018-11-19 RX ADMIN — WARFARIN SODIUM SCH MG: 3 TABLET ORAL at 18:23

## 2018-11-19 RX ADMIN — IPRATROPIUM BROMIDE AND ALBUTEROL SULFATE SCH NEB: .5; 3 SOLUTION RESPIRATORY (INHALATION) at 01:16

## 2018-11-19 RX ADMIN — PIPERACILLIN AND TAZOBACTAM SCH MLS/HR: 3; .375 INJECTION, POWDER, LYOPHILIZED, FOR SOLUTION INTRAVENOUS; PARENTERAL at 15:11

## 2018-11-19 RX ADMIN — CHLORHEXIDINE GLUCONATE 0.12% ORAL RINSE SCH ML: 1.2 LIQUID ORAL at 12:33

## 2018-11-19 RX ADMIN — IPRATROPIUM BROMIDE AND ALBUTEROL SULFATE SCH NEB: .5; 3 SOLUTION RESPIRATORY (INHALATION) at 08:02

## 2018-11-19 RX ADMIN — PROPOFOL SCH MLS/HR: 10 INJECTION, EMULSION INTRAVENOUS at 08:45

## 2018-11-19 RX ADMIN — PROPOFOL SCH MLS/HR: 10 INJECTION, EMULSION INTRAVENOUS at 15:03

## 2018-11-19 RX ADMIN — NYSTATIN SCH APPLIC: 100000 POWDER TOPICAL at 20:24

## 2018-11-19 RX ADMIN — PROPOFOL SCH MLS/HR: 10 INJECTION, EMULSION INTRAVENOUS at 03:24

## 2018-11-19 RX ADMIN — TAMSULOSIN HYDROCHLORIDE SCH MG: 0.4 CAPSULE ORAL at 20:04

## 2018-11-19 RX ADMIN — MOMETASONE FUROATE AND FORMOTEROL FUMARATE DIHYDRATE SCH: 200; 5 AEROSOL RESPIRATORY (INHALATION) at 08:02

## 2018-11-19 RX ADMIN — MOMETASONE FUROATE AND FORMOTEROL FUMARATE DIHYDRATE SCH: 200; 5 AEROSOL RESPIRATORY (INHALATION) at 19:56

## 2018-11-19 RX ADMIN — CHLORHEXIDINE GLUCONATE 0.12% ORAL RINSE SCH ML: 1.2 LIQUID ORAL at 20:04

## 2018-11-19 RX ADMIN — CHLORHEXIDINE GLUCONATE 0.12% ORAL RINSE SCH ML: 1.2 LIQUID ORAL at 18:23

## 2018-11-19 RX ADMIN — LANSOPRAZOLE SCH MG: KIT at 09:06

## 2018-11-19 NOTE — PN
Date of Service: 11/19/18


Critical Care Services: 





68M with htn, hld, ckd, afib, chf s/p aicd, copd, hi/ohs, bph with chronic 

indwelling alexandre, recurrent UTI, presents with hypercapnic respiratory failure. 

Intubated in the ER.





11/14: remains intubated.


11/15: ET tube migrated out. Advanced via bronchoscope at bedside. Copious 

secretions throughout airway.


11/16: Remains intubated. zosyn for proteus in urine. Weaning levophed.


11/17: low grade temp overnight. renal function improving slowly.


11/18: febrile yesterday. on vanc/zosyn. cxr slighly better. did not tolerate 

sbt today.


11/19: Sodium 151 today. Cr stable. On PS 15/5 and doing ok. Excessive 

secretions.





Vital Signs: 











Temp Pulse Resp BP SpO2 FiO2


 


98.8 F 99 23 101/49 97 50


 


11/19/18 08:00 11/19/18 08:03 11/19/18 08:03 11/19/18 07:30 11/19/18 08:03 11/19 /18 08:03











Physical Exam: 





Gen -  Intubated and sedated


HEENT - nact, perrl


neck  - unable to assess jvd, no thyromegaly


cv - s1/s2, no murmur


lung - dec bs bilaterally


abd - soft, nt


ext - no cce


neuro - arousable to physical stimuli





Fluid Balance (Past 24 Hours): 


I=     O=     Net 


 Intake & Output











 11/17/18 11/18/18 11/19/18 11/20/18





 06:59 06:59 06:59 06:59


 


Intake Total 4050 3833 3628.6 


 


Output Total 2555 2570 1850 230


 


Balance 1495 1263 1778.6 -230


 


Weight 235.7 kg 238.5 kg 234 kg 


 


Intake:    


 


  IV Fluids 154 811 347.6 


 


    ABX - VANCOMYCIN   53 


 


    ABX - ZOSYN   199.6 


 


    NS (0.9%) 154 811 95 


 


  IVPB 234 198 301 


 


    ABX - VANCOMYCIN   301 


 


    ABX - ZOSYN 220 198  


 


    NS (0.9%) 14   


 


  Medicated IV 2174 1750 707 


 


    CC - Norepinephrine/ 1525 1112 298 





    Levophed    


 


    CC - Propofol/Diprivan [phone] 


 


  Oral 0  0 


 


  Tube Feeding [phone] 


 


  Tube Feeding Flush Amount 290 130 200 


 


  NG Tube Irrigate Amount   203 


 


Output:    


 


  Alexandre 2555 2570 1850 230


 


Other:    


 


  Date of Last Bowel  11/18/2018  





  Movement    


 


  # Bowel Movements  1  


 


  Estimated Stool Amount  Large  





 








Labs: 


 Laboratory Results - last 24 hr











  11/19/18 11/19/18 11/19/18





  04:40 04:40 04:40


 


WBC   9.0 


 


RBC   3.49 L 


 


Hgb   8.6 L 


 


Hct   28 L 


 


MCV   80 


 


MCH   25 L 


 


MCHC   31 


 


RDW   19 H 


 


Plt Count   186 


 


MPV   7.5 


 


Neut % (Auto)   80.6 


 


Lymph % (Auto)   7.5 L 


 


Mono % (Auto)   6.5 


 


Eos % (Auto)   4.5 


 


Baso % (Auto)   0.9 


 


Absolute Neuts (auto)   7.2 


 


Absolute Lymphs (auto)   0.7 L 


 


Absolute Monos (auto)   0.6 


 


Absolute Eos (auto)   0.4 


 


Absolute Basos (auto)   0.1 


 


Absolute Nucleated RBC   0 


 


Nucleated RBC %   0 


 


INR (Anticoag Therapy)    2.44 H


 


Sodium  151 H  


 


Potassium  4.0  


 


Chloride  111  


 


Carbon Dioxide  31  


 


Anion Gap  9  


 


BUN  103 H  


 


Creatinine  3.41 H  


 


Est GFR ( Amer)  21.8  


 


Est GFR (Non-Af Amer)  18.0  


 


BUN/Creatinine Ratio  30.2 H  


 


Glucose  138 H  


 


Calcium  8.3 L  


 


Phosphorus  5.4 H  


 


Magnesium  3.0 H  


 


Random Vancomycin  23.5  











Studies: 





CXR 11/16


IMPRESSION:


1. LIMITED STUDY.


2. FINDINGS MOST CONSISTENT WITH CONGESTIVE HEART FAILURE, UNCHANGED.


3. PROBABLE HERNIA OF A SMALL PORTION OF THE ANTEROLATERAL ASPECT OF THE LEFT 

LUNG,


UNCHANGED.





CXR 11/13


IMPRESSION: FINDINGS SUGGESTIVE OF CONGESTIVE HEART FAILURE, RECOMMEND FOLLOW-

UP CHEST


X-RAYS TO RESOLUTION.





CT Head 11/13


Very limited examination secondary to patient positioning, patient body


habitus, presence of portion of shoulder within the field-of-view the


examination, and motion artifact. No definite evidence of midline shift. Cannot


assess for presence of intracranial hemorrhage or gray-white matter


differentiation.





Impression: 





68M with htn, hld, ckd, afib, chf s/p aicd, copd, hi/ohs, bph with chronic 

indwelling alexandre, recurrent UTI, presents with hypercapnic respiratory failure. 

Intubated in the ER.





Plan: 





Neuro - AMS


- 2/2 hypercapnea +/- uremia


- ct poor study





CV - htn, hld, chf, afib


- c/w ac with coumadin for afib


- bp dropped due to sedation


- weaning levophed





Pulm - copd, hi/ohs


- c/w home laba/ics


- nebulizers q4h


- wean vent


- tolerating PS 15/5. 


- extubate to bipap when ready





ID - hcap?


- proteus in urine


- strep mitis in 1/4 blood cultures


- on vanc/zosyn


- f/u cultures





GI - ppi





Renal - ckd, uremia, hypernatremia


- monitor lytes


- strict i/o


- kidney function slowly improving


- free water added for hypernatremia





Heme - anemia


- monitor for bleeding


- hgb stable





Endo - check fs, niss





lines - tlc, alexandre





PPx - gi/dvt





Full Code





Critical Care Time: 40 mins

## 2018-11-20 LAB
BASOPHILS # BLD AUTO: 0.1 10^3/UL (ref 0–0.2)
EOSINOPHIL # BLD AUTO: 0.6 10^3/UL (ref 0–0.6)
HCT VFR BLD AUTO: 29 % (ref 42–52)
HGB BLD-MCNC: 8.9 G/DL (ref 14–18)
INR PPP/BLD: 2.1 (ref 0.77–1.02)
LYMPHOCYTES # BLD AUTO: 0.7 10^3/UL (ref 1–4.8)
MCH RBC QN AUTO: 25 PG (ref 27–31)
MCHC RBC AUTO-ENTMCNC: 31 G/DL (ref 31–36)
MCV RBC AUTO: 80 FL (ref 80–94)
MONOCYTES # BLD AUTO: 0.5 10^3/UL (ref 0–0.8)
NEUTROPHILS # BLD AUTO: 6.9 10^3/UL (ref 1.5–7.7)
NRBC # BLD AUTO: 0 10^3/UL
NRBC BLD QL AUTO: 0
PLATELET # BLD AUTO: 204 10^3/UL (ref 150–450)
RBC # BLD AUTO: 3.63 10^6/UL (ref 4–5.4)
WBC # BLD AUTO: 8.8 10^3/UL (ref 3.5–10.8)

## 2018-11-20 RX ADMIN — MOMETASONE FUROATE AND FORMOTEROL FUMARATE DIHYDRATE SCH: 200; 5 AEROSOL RESPIRATORY (INHALATION) at 20:21

## 2018-11-20 RX ADMIN — CHLORHEXIDINE GLUCONATE 0.12% ORAL RINSE SCH ML: 1.2 LIQUID ORAL at 17:22

## 2018-11-20 RX ADMIN — CHLORHEXIDINE GLUCONATE 0.12% ORAL RINSE SCH ML: 1.2 LIQUID ORAL at 06:11

## 2018-11-20 RX ADMIN — CHLORHEXIDINE GLUCONATE 0.12% ORAL RINSE SCH ML: 1.2 LIQUID ORAL at 00:10

## 2018-11-20 RX ADMIN — CHLORHEXIDINE GLUCONATE 0.12% ORAL RINSE SCH ML: 1.2 LIQUID ORAL at 09:17

## 2018-11-20 RX ADMIN — CHLORHEXIDINE GLUCONATE 0.12% ORAL RINSE SCH ML: 1.2 LIQUID ORAL at 20:03

## 2018-11-20 RX ADMIN — MOMETASONE FUROATE AND FORMOTEROL FUMARATE DIHYDRATE SCH: 200; 5 AEROSOL RESPIRATORY (INHALATION) at 09:47

## 2018-11-20 RX ADMIN — NYSTATIN SCH APPLIC: 100000 POWDER TOPICAL at 20:03

## 2018-11-20 RX ADMIN — PROPOFOL SCH MLS/HR: 10 INJECTION, EMULSION INTRAVENOUS at 03:15

## 2018-11-20 RX ADMIN — PROPOFOL SCH MLS/HR: 10 INJECTION, EMULSION INTRAVENOUS at 06:11

## 2018-11-20 RX ADMIN — NYSTATIN SCH APPLIC: 100000 POWDER TOPICAL at 17:22

## 2018-11-20 RX ADMIN — PIPERACILLIN AND TAZOBACTAM SCH MLS/HR: 3; .375 INJECTION, POWDER, LYOPHILIZED, FOR SOLUTION INTRAVENOUS; PARENTERAL at 02:03

## 2018-11-20 RX ADMIN — PROPOFOL SCH MLS/HR: 10 INJECTION, EMULSION INTRAVENOUS at 21:56

## 2018-11-20 RX ADMIN — PROPOFOL SCH MLS/HR: 10 INJECTION, EMULSION INTRAVENOUS at 17:32

## 2018-11-20 RX ADMIN — NOREPINEPHRINE BITARTRATE SCH MLS/HR: 1 INJECTION INTRAVENOUS at 10:59

## 2018-11-20 RX ADMIN — CHLORHEXIDINE GLUCONATE 0.12% ORAL RINSE SCH ML: 1.2 LIQUID ORAL at 14:00

## 2018-11-20 RX ADMIN — PROPOFOL SCH MLS/HR: 10 INJECTION, EMULSION INTRAVENOUS at 00:10

## 2018-11-20 RX ADMIN — NYSTATIN SCH APPLIC: 100000 POWDER TOPICAL at 09:17

## 2018-11-20 RX ADMIN — WARFARIN SODIUM SCH MG: 3 TABLET ORAL at 17:22

## 2018-11-20 RX ADMIN — NOREPINEPHRINE BITARTRATE SCH: 1 INJECTION INTRAVENOUS at 13:25

## 2018-11-20 RX ADMIN — LANSOPRAZOLE SCH MG: KIT at 10:59

## 2018-11-20 RX ADMIN — TAMSULOSIN HYDROCHLORIDE SCH MG: 0.4 CAPSULE ORAL at 20:03

## 2018-11-20 RX ADMIN — PIPERACILLIN AND TAZOBACTAM SCH MLS/HR: 3; .375 INJECTION, POWDER, LYOPHILIZED, FOR SOLUTION INTRAVENOUS; PARENTERAL at 10:58

## 2018-11-20 RX ADMIN — PIPERACILLIN AND TAZOBACTAM SCH MLS/HR: 3; .375 INJECTION, POWDER, LYOPHILIZED, FOR SOLUTION INTRAVENOUS; PARENTERAL at 21:05

## 2018-11-20 NOTE — PN
Date of Service: 11/20/18


Critical Care Services: 





68M with htn, hld, ckd, afib, chf s/p aicd, copd, hi/ohs, bph with chronic 

indwelling alexandre, recurrent UTI, presents with hypercapnic respiratory failure. 

Intubated in the ER.





11/14: remains intubated.


11/15: ET tube migrated out. Advanced via bronchoscope at bedside. Copious 

secretions throughout airway.


11/16: Remains intubated. zosyn for proteus in urine. Weaning levophed.


11/17: low grade temp overnight. renal function improving slowly.


11/18: febrile yesterday. on vanc/zosyn. cxr slighly better. did not tolerate 

sbt today.


11/19: Sodium 151 today. Cr stable. On PS 15/5 and doing ok. Excessive 

secretions.


11/20: OGT removed overnight. Creatinine down to 3.1. 








Vital Signs: 











Temp Pulse Resp BP SpO2 FiO2


 


97.3 F 70 20 111/63 100 40


 


11/20/18 07:16 11/20/18 07:16 11/20/18 06:00 11/20/18 07:15 11/20/18 07:16 11/20 /18 04:00











Physical Exam: 





Gen -  Intubated and sedated


HEENT - nact, perrl


neck  - unable to assess jvd, no thyromegaly


cv - s1/s2, no murmur


lung - dec bs bilaterally


abd - soft, nt


ext - no cce


neuro - arousable to physical stimuli





Fluid Balance (Past 24 Hours): 


I=     O=     Net 


 Intake & Output











 11/18/18 11/19/18 11/20/18 11/21/18





 06:59 06:59 06:59 06:59


 


Intake Total 3833 3628.6 3988 


 


Output Total 2570 1850 2042 


 


Balance 1263 1778.6 1946 


 


Weight 238.5 kg 234 kg 230.4 kg 


 


Intake:    


 


  IV Fluids 811 347.6 1450 


 


    ABX - VANCOMYCIN  53  


 


    ABX - ZOSYN  199.6 205 


 


    LR   1245 


 


    NS (0.9%) 811 95  


 


  IVPB 198 301  


 


    ABX - VANCOMYCIN  301  


 


    ABX - ZOSYN 198   


 


  Medicated IV 2230 606 7178 


 


    CC - Norepinephrine/ 1112 298 709 





    Levophed    


 


    CC - Propofol/Diprivan 638 409 517 


 


  Oral  0 0 


 


  Tube Feeding 944 1870 1072 


 


  Tube Feeding Flush Amount 130 200 100 


 


  NG Tube Irrigate Amount  203 140 


 


Output:    


 


  [place] 2570 1850 2042 


 


Other:    


 


  Date of Last Bowel 11/18/2018 11/20/18 





  Movement    


 


  # Bowel Movements 1  1 


 


  Estimated Stool Amount Large  Large 





 








Labs: 


 Laboratory Results - last 24 hr











  11/20/18 11/20/18 11/20/18





  03:50 03:50 03:50


 


WBC   8.8 


 


RBC   3.63 L 


 


Hgb   8.9 L 


 


Hct   29 L 


 


MCV   80 


 


MCH   25 L 


 


MCHC   31 


 


RDW   19 H 


 


Plt Count   204 


 


MPV   7.5 


 


Neut % (Auto)   78.7 


 


Lymph % (Auto)   7.5 L 


 


Mono % (Auto)   5.4 


 


Eos % (Auto)   7.1 H 


 


Baso % (Auto)   1.3 


 


Absolute Neuts (auto)   6.9 


 


Absolute Lymphs (auto)   0.7 L 


 


Absolute Monos (auto)   0.5 


 


Absolute Eos (auto)   0.6 


 


Absolute Basos (auto)   0.1 


 


Absolute Nucleated RBC   0 


 


Nucleated RBC %   0 


 


INR (Anticoag Therapy)    2.10 H


 


Sodium  150 H  


 


Potassium  3.9  


 


Chloride  112 H  


 


Carbon Dioxide  31  


 


Anion Gap  7  


 


BUN  95 H  


 


Creatinine  3.09 H  


 


Est GFR ( Amer)  24.4  


 


Est GFR (Non-Af Amer)  20.2  


 


BUN/Creatinine Ratio  30.7 H  


 


Glucose  119 H  


 


Calcium  8.4 L  


 


Magnesium  3.1 H  











Studies: 





CXR 11/16


IMPRESSION:


1. LIMITED STUDY.


2. FINDINGS MOST CONSISTENT WITH CONGESTIVE HEART FAILURE, UNCHANGED.


3. PROBABLE HERNIA OF A SMALL PORTION OF THE ANTEROLATERAL ASPECT OF THE LEFT 

LUNG,


UNCHANGED.





CXR 11/13


IMPRESSION: FINDINGS SUGGESTIVE OF CONGESTIVE HEART FAILURE, RECOMMEND FOLLOW-

UP CHEST


X-RAYS TO RESOLUTION.





CT Head 11/13


Very limited examination secondary to patient positioning, patient body


habitus, presence of portion of shoulder within the field-of-view the


examination, and motion artifact. No definite evidence of midline shift. Cannot


assess for presence of intracranial hemorrhage or gray-white matter


differentiation.





Impression: 





68M with htn, hld, ckd, afib, chf s/p aicd, copd, hi/ohs, bph with chronic 

indwelling alexandre, recurrent UTI, presents with hypercapnic respiratory failure. 

Intubated in the ER.





Plan: 





Neuro - AMS


- 2/2 hypercapnea +/- uremia


- ct poor study





CV - htn, hld, chf, afib


- c/w ac with coumadin for afib


- bp dropped due to sedation


- weaning levophed





Pulm - copd, hi/ohs


- c/w home laba/ics


- nebulizers q4h


- wean vent


- tolerating PS 15/5. 


- extubate to bipap when ready





ID - hcap?


- proteus in urine


- strep mitis in 1/4 blood cultures


- on vanc/zosyn


- f/u cultures





GI - ppi





Renal - ckd, uremia, hypernatremia


- monitor lytes


- strict i/o


- kidney function slowly improving


- free water added for hypernatremia





Heme - anemia


- monitor for bleeding


- hgb stable





Endo - check fs, niss





lines - tlc, alexandre





PPx - gi/dvt





Full Code





Critical Care Time: 40 mins

## 2018-11-21 LAB
BASOPHILS # BLD AUTO: 0.1 10^3/UL (ref 0–0.2)
EOSINOPHIL # BLD AUTO: 0.5 10^3/UL (ref 0–0.6)
HCT VFR BLD AUTO: 28 % (ref 42–52)
HGB BLD-MCNC: 8.8 G/DL (ref 14–18)
INR PPP/BLD: 2.11 (ref 0.77–1.02)
LYMPHOCYTES # BLD AUTO: 0.6 10^3/UL (ref 1–4.8)
MCH RBC QN AUTO: 25 PG (ref 27–31)
MCHC RBC AUTO-ENTMCNC: 31 G/DL (ref 31–36)
MCV RBC AUTO: 81 FL (ref 80–94)
MONOCYTES # BLD AUTO: 0.4 10^3/UL (ref 0–0.8)
NEUTROPHILS # BLD AUTO: 6.2 10^3/UL (ref 1.5–7.7)
NRBC # BLD AUTO: 0 10^3/UL
NRBC BLD QL AUTO: 0
PLATELET # BLD AUTO: 186 10^3/UL (ref 150–450)
RBC # BLD AUTO: 3.52 10^6/UL (ref 4–5.4)
WBC # BLD AUTO: 7.8 10^3/UL (ref 3.5–10.8)

## 2018-11-21 RX ADMIN — CHLORHEXIDINE GLUCONATE 0.12% ORAL RINSE SCH ML: 1.2 LIQUID ORAL at 01:20

## 2018-11-21 RX ADMIN — PROPOFOL SCH MLS/HR: 10 INJECTION, EMULSION INTRAVENOUS at 01:20

## 2018-11-21 RX ADMIN — CHLORHEXIDINE GLUCONATE 0.12% ORAL RINSE SCH ML: 1.2 LIQUID ORAL at 10:24

## 2018-11-21 RX ADMIN — NOREPINEPHRINE BITARTRATE SCH: 1 INJECTION INTRAVENOUS at 16:56

## 2018-11-21 RX ADMIN — CHLORHEXIDINE GLUCONATE 0.12% ORAL RINSE SCH ML: 1.2 LIQUID ORAL at 17:07

## 2018-11-21 RX ADMIN — CHLORHEXIDINE GLUCONATE 0.12% ORAL RINSE SCH ML: 1.2 LIQUID ORAL at 20:19

## 2018-11-21 RX ADMIN — NOREPINEPHRINE BITARTRATE SCH: 1 INJECTION INTRAVENOUS at 05:39

## 2018-11-21 RX ADMIN — MOMETASONE FUROATE AND FORMOTEROL FUMARATE DIHYDRATE SCH: 200; 5 AEROSOL RESPIRATORY (INHALATION) at 15:23

## 2018-11-21 RX ADMIN — NYSTATIN SCH APPLIC: 100000 POWDER TOPICAL at 20:20

## 2018-11-21 RX ADMIN — CHLORHEXIDINE GLUCONATE 0.12% ORAL RINSE SCH ML: 1.2 LIQUID ORAL at 03:47

## 2018-11-21 RX ADMIN — LANSOPRAZOLE SCH MG: KIT at 10:24

## 2018-11-21 RX ADMIN — CHLORHEXIDINE GLUCONATE 0.12% ORAL RINSE SCH ML: 1.2 LIQUID ORAL at 23:38

## 2018-11-21 RX ADMIN — NYSTATIN SCH: 100000 POWDER TOPICAL at 13:03

## 2018-11-21 RX ADMIN — WARFARIN SODIUM SCH MG: 3 TABLET ORAL at 17:07

## 2018-11-21 RX ADMIN — CHLORHEXIDINE GLUCONATE 0.12% ORAL RINSE SCH ML: 1.2 LIQUID ORAL at 14:16

## 2018-11-21 RX ADMIN — PROPOFOL SCH MLS/HR: 10 INJECTION, EMULSION INTRAVENOUS at 21:35

## 2018-11-21 RX ADMIN — NYSTATIN SCH APPLIC: 100000 POWDER TOPICAL at 14:16

## 2018-11-21 RX ADMIN — PROPOFOL SCH MLS/HR: 10 INJECTION, EMULSION INTRAVENOUS at 14:04

## 2018-11-21 RX ADMIN — PROPOFOL SCH MLS/HR: 10 INJECTION, EMULSION INTRAVENOUS at 05:40

## 2018-11-21 RX ADMIN — PROPOFOL SCH MLS/HR: 10 INJECTION, EMULSION INTRAVENOUS at 23:35

## 2018-11-21 NOTE — PN
Date of Service: 11/21/18


Critical Care Services: 


Patient continues on ventilator (Day 8) - attempts to wean have been 

unsuccessful.





Vital Signs: 











Temp Pulse Resp BP SpO2 FiO2


 


97.0 F 69 23 89/55 99 40











Physical Exam: 


Gen:Eyes open but does not follow verbal commands


Lungs:BS very distant


Extremities:3-4+ edema. Multiple epidermal fluid-filled blisters





Fluid Balance (Past 24 Hours): 











 11/20/18 11/21/18 11/22/18





 06:59 06:59 06:59


 


Intake Total 3988 1991.4 863.8


 


Output Total 2042 1985 4090


 


Balance 1946 6.4 -3226.2


 


Weight 507 lb 511 lb 


 


Intake:   


 


  IV Fluids 1450 476.4 42.3


 


    ABX - VANCOMYCIN   


 


    ABX - ZOSYN 205 81.4 


 


    D5W   42.3


 


    LR 1245 395 


 


    NS (0.9%)   


 


  IVPB  400 


 


    ABX - VANCOMYCIN  300 


 


    ABX - ZOSYN  100 


 


  Medicated IV 1226 905 185.5


 


    CC - Norepinephrine/ 709 406 76.5





    Levophed   


 


    CC - Propofol/Diprivan 517 499 109


 


  Oral 0 0 0


 


  Tube Feeding 1072  511


 


  Tube Feeding Flush Amount 100 160 125


 


  NG Tube Irrigate Amount 140  


 


  [place] Irrigate Amount  50 


 


Output:   


 


  [place] 2042 1985 2490


 


  Residual   1600


 


    Coude 18 Fr   1600


 


Other:   


 


  Date of Last Bowel 11/20/18 11/21/18 





  Movement   


 


  # Bowel Movements 1 1 


 


  Estimated Stool Amount Large Medium 





 








Labs: 











  11/21/18 11/21/18 11/21/18





  04:20 04:20 04:20


 


WBC   7.8 


 


RBC   3.52 L 


 


Hgb   8.8 L 


 


Hct   28 L 


 


MCV   81 


 


MCH   25 L 


 


MCHC   31 


 


RDW   19 H 


 


Plt Count   186 


 


MPV   7.6 


 


Neut % (Auto)   79.3 


 


Lymph % (Auto)   8.3 L 


 


Mono % (Auto)   4.7 


 


Eos % (Auto)   6.5 H 


 


Baso % (Auto)   1.2 


 


Absolute Neuts (auto)   6.2 


 


Absolute Lymphs (auto)   0.6 L 


 


Absolute Monos (auto)   0.4 


 


Absolute Eos (auto)   0.5 


 


Absolute Basos (auto)   0.1 


 


Absolute Nucleated RBC   0 


 


Nucleated RBC %   0 


 


INR (Anticoag Therapy)    2.11 H


 


Sodium  155 H  


 


Potassium  3.8  


 


Chloride  111  


 


Carbon Dioxide  31  


 


Anion Gap  13 H  


 


BUN  85 H  


 


Creatinine  3.01 H  


 


Est GFR ( Amer)  25.1  


 


Est GFR (Non-Af Amer)  20.8  


 


BUN/Creatinine Ratio  28.2 H  


 


Glucose  115 H  


 


Calcium  8.3 L  


 


Magnesium  3.0 H  











Studies: 


None today





Nutrition: 


Nepro at 60 cc/hr





Impression: 


Is ventilator dependent due to Obesity-Hypoventilation Syndrome and massive 

fluid overload.





Plan: 


Aggressive diuresis. I have spoken with family about tracheostomy, which might 

be necessary if patient not weaning, and would also help with the patients 

obstructive sleep apnea.








Critical Care Time: 40 minutes

## 2018-11-22 LAB
HCT VFR BLD AUTO: 29 % (ref 42–52)
HGB BLD-MCNC: 9.2 G/DL (ref 14–18)
INR PPP/BLD: 2 (ref 0.77–1.02)
MCH RBC QN AUTO: 25 PG (ref 27–31)
MCHC RBC AUTO-ENTMCNC: 32 G/DL (ref 31–36)
MCV RBC AUTO: 79 FL (ref 80–94)
PLATELET # BLD AUTO: 182 10^3/UL (ref 150–450)
RBC # BLD AUTO: 3.68 10^6/UL (ref 4–5.4)
WBC # BLD AUTO: 6 10^3/UL (ref 3.5–10.8)

## 2018-11-22 RX ADMIN — NOREPINEPHRINE BITARTRATE SCH: 1 INJECTION INTRAVENOUS at 05:29

## 2018-11-22 RX ADMIN — NOREPINEPHRINE BITARTRATE SCH MLS/HR: 1 INJECTION INTRAVENOUS at 01:54

## 2018-11-22 RX ADMIN — CHLORHEXIDINE GLUCONATE 0.12% ORAL RINSE SCH ML: 1.2 LIQUID ORAL at 04:45

## 2018-11-22 RX ADMIN — CHLORHEXIDINE GLUCONATE 0.12% ORAL RINSE SCH ML: 1.2 LIQUID ORAL at 08:55

## 2018-11-22 RX ADMIN — FAMOTIDINE SCH MG: 20 TABLET, FILM COATED ORAL at 08:55

## 2018-11-22 RX ADMIN — PROPOFOL SCH MLS/HR: 10 INJECTION, EMULSION INTRAVENOUS at 03:07

## 2018-11-22 RX ADMIN — FUROSEMIDE SCH MG: 10 INJECTION, SOLUTION INTRAMUSCULAR; INTRAVENOUS at 08:56

## 2018-11-22 RX ADMIN — PROPOFOL SCH MLS/HR: 10 INJECTION, EMULSION INTRAVENOUS at 17:53

## 2018-11-22 RX ADMIN — PROPOFOL SCH MLS/HR: 10 INJECTION, EMULSION INTRAVENOUS at 20:17

## 2018-11-22 RX ADMIN — PROPOFOL SCH MLS/HR: 10 INJECTION, EMULSION INTRAVENOUS at 05:35

## 2018-11-22 RX ADMIN — NYSTATIN SCH APPLIC: 100000 POWDER TOPICAL at 15:16

## 2018-11-22 RX ADMIN — PROPOFOL SCH MLS/HR: 10 INJECTION, EMULSION INTRAVENOUS at 12:20

## 2018-11-22 RX ADMIN — CHLORHEXIDINE GLUCONATE 0.12% ORAL RINSE SCH ML: 1.2 LIQUID ORAL at 16:36

## 2018-11-22 RX ADMIN — NOREPINEPHRINE BITARTRATE SCH: 1 INJECTION INTRAVENOUS at 17:08

## 2018-11-22 RX ADMIN — WARFARIN SODIUM SCH MG: 3 TABLET ORAL at 16:51

## 2018-11-22 RX ADMIN — PROPOFOL SCH MLS/HR: 10 INJECTION, EMULSION INTRAVENOUS at 22:56

## 2018-11-22 RX ADMIN — CHLORHEXIDINE GLUCONATE 0.12% ORAL RINSE SCH ML: 1.2 LIQUID ORAL at 13:42

## 2018-11-22 RX ADMIN — PROPOFOL SCH MLS/HR: 10 INJECTION, EMULSION INTRAVENOUS at 15:14

## 2018-11-22 RX ADMIN — CHLORHEXIDINE GLUCONATE 0.12% ORAL RINSE SCH ML: 1.2 LIQUID ORAL at 20:17

## 2018-11-22 RX ADMIN — NYSTATIN SCH APPLIC: 100000 POWDER TOPICAL at 08:56

## 2018-11-22 RX ADMIN — NYSTATIN SCH APPLIC: 100000 POWDER TOPICAL at 20:17

## 2018-11-22 NOTE — PN
Date of Service: 11/22/18


Critical Care Services: 


Continues on ventilator. Has diuresed over 4 liters in past 24 hours.





Vital Signs: 











Temp Pulse Resp BP SpO2 FiO2


 


97.0 F 65 20 76/42 95 20


 


11/21/18 11:16 11/22/18 09:46 11/22/18 08:00 11/22/18 09:46 11/22/18 09:46 11/22 /18 08:00











Physical Exam: 


Gen:Unresponsive (on propofol)


HEENT:OT and OG tubes in place


Lungs:Scattered rhonchi


Extremities:3-4+ edema. Multiple vesicular lesions on all limbs and on back.





Fluid Balance (Past 24 Hours): 











 11/20/18 11/21/18 11/22/18





 06:59 06:59 06:59


 


Intake Total 3988 1991.4 1215.8


 


Output Total 2042 1985 5650


 


Balance 1946 6.4 -4434.2


 


Weight 507 lb 511 lb 512 lb


 


Intake:   


 


  IV Fluids 1450 476.4 42.3


 


    ABX - ZOSYN 205 81.4 


 


    D5W   42.3


 


    LR 1245 395 


 


  IVPB  400 


 


    ABX - VANCOMYCIN  300 


 


    ABX - ZOSYN  100 


 


  Medicated IV 1226 905 537.5


 


    CC - Norepinephrine/ 709 406 92.5





    Levophed   


 


    CC - Propofol/Diprivan 517 499 445


 


  Oral 0 0 0


 


  Tube Feeding 1072  511


 


  Tube Feeding Flush Amount 100 160 125


 


  NG Tube Irrigate Amount 140  


 


  [place] Irrigate Amount  50 


 


Output:   


 


  Urine   175


 


  [place] 2042 1985 3875


 


  Residual   1600


 


    Coude 18 Fr   1600


 


Other:   


 


  Date of Last Bowel 11/20/18 11/21/18 





  Movement   


 


  # Bowel Movements 1 1 


 


  Estimated Stool Amount Large Medium 





 





Labs: 











  11/21/18 11/22/18 11/22/18





  17:20 05:10 05:10


 


   


 


   


 


   


 


   


 


   


 


   


 


Sodium   152 H 


 


Potassium   3.6 


 


Chloride   114 H 


 


Carbon Dioxide   32 


 


Anion Gap   6 


 


BUN   85 H 


 


Creatinine   3.08 H 


 


Glucose   111 H 


 


Calcium   8.3 L 


 


B-Natriuretic Peptide    260 H


 


U Sodium Concentration  95  














  11/22/18 11/22/18





  05:10 05:10


 


WBC  6.0 


 


RBC  3.68 L 


 


Hgb  9.2 L 


 


Hct  29 L 


 


MCV  79 L 


 


MCH  25 L 


 


MCHC  32 


 


RDW  18 H 


 


Plt Count  182 


 


MPV  8.0 


 


INR (Anticoag Therapy)   2.00


 


Sodium  


 


Potassium  


 


Chloride  


 


Carbon Dioxide  


 


Anion Gap  


 


BUN  


 


Creatinine  


 


Est GFR ( Amer)  


 


Est GFR (Non-Af Amer)  


 


BUN/Creatinine Ratio  


 


Glucose  


 


Calcium  


 


B-Natriuretic Peptide  


 


U Sodium Concentration  











Studies: 


NONE





Nutrition: 


Tube feedings with Nepro at about 1200 marco/day





Impression: 


No change in clinical status, but is diuresing satisfactorily.





Plan: 


Continue aggressive diuresis in an attempt to wean from the ventilator.  If not 

successful, will proceed with tracheostomy (if family agrees).








Critical Care Time: 40 minutes

## 2018-11-23 RX ADMIN — FAMOTIDINE SCH MG: 20 TABLET, FILM COATED ORAL at 09:22

## 2018-11-23 RX ADMIN — CHLORHEXIDINE GLUCONATE 0.12% ORAL RINSE SCH ML: 1.2 LIQUID ORAL at 16:47

## 2018-11-23 RX ADMIN — NYSTATIN SCH APPLIC: 100000 POWDER TOPICAL at 09:25

## 2018-11-23 RX ADMIN — NYSTATIN SCH APPLIC: 100000 POWDER TOPICAL at 14:23

## 2018-11-23 RX ADMIN — CHLORHEXIDINE GLUCONATE 0.12% ORAL RINSE SCH ML: 1.2 LIQUID ORAL at 19:50

## 2018-11-23 RX ADMIN — NYSTATIN SCH APPLIC: 100000 POWDER TOPICAL at 23:03

## 2018-11-23 RX ADMIN — PROPOFOL SCH MLS/HR: 10 INJECTION, EMULSION INTRAVENOUS at 01:46

## 2018-11-23 RX ADMIN — PROPOFOL SCH MLS/HR: 10 INJECTION, EMULSION INTRAVENOUS at 04:29

## 2018-11-23 RX ADMIN — CHLORHEXIDINE GLUCONATE 0.12% ORAL RINSE SCH ML: 1.2 LIQUID ORAL at 23:41

## 2018-11-23 RX ADMIN — CHLORHEXIDINE GLUCONATE 0.12% ORAL RINSE SCH ML: 1.2 LIQUID ORAL at 13:36

## 2018-11-23 RX ADMIN — PROPOFOL SCH MLS/HR: 10 INJECTION, EMULSION INTRAVENOUS at 16:53

## 2018-11-23 RX ADMIN — PROPOFOL SCH MLS/HR: 10 INJECTION, EMULSION INTRAVENOUS at 23:39

## 2018-11-23 RX ADMIN — CHLORHEXIDINE GLUCONATE 0.12% ORAL RINSE SCH ML: 1.2 LIQUID ORAL at 07:28

## 2018-11-23 RX ADMIN — NOREPINEPHRINE BITARTRATE SCH MLS/HR: 1 INJECTION INTRAVENOUS at 09:22

## 2018-11-23 RX ADMIN — PROPOFOL SCH MLS/HR: 10 INJECTION, EMULSION INTRAVENOUS at 20:17

## 2018-11-23 RX ADMIN — PROPOFOL SCH MLS/HR: 10 INJECTION, EMULSION INTRAVENOUS at 07:28

## 2018-11-23 RX ADMIN — CHLORHEXIDINE GLUCONATE 0.12% ORAL RINSE SCH ML: 1.2 LIQUID ORAL at 05:14

## 2018-11-23 RX ADMIN — PROPOFOL SCH MLS/HR: 10 INJECTION, EMULSION INTRAVENOUS at 13:36

## 2018-11-23 RX ADMIN — FUROSEMIDE SCH MG: 10 INJECTION, SOLUTION INTRAMUSCULAR; INTRAVENOUS at 09:22

## 2018-11-23 RX ADMIN — NOREPINEPHRINE BITARTRATE SCH: 1 INJECTION INTRAVENOUS at 19:50

## 2018-11-23 RX ADMIN — CHLORHEXIDINE GLUCONATE 0.12% ORAL RINSE SCH ML: 1.2 LIQUID ORAL at 00:03

## 2018-11-23 RX ADMIN — WARFARIN SODIUM SCH MG: 3 TABLET ORAL at 16:47

## 2018-11-23 NOTE — PN
Date of Service: 11/23/18


Critical Care Services: 


Continued attempts to diurese patient. Wean attempt today was unsuccessful.








Vital Signs: 











Temp Pulse Resp BP SpO2 FiO2


 


98.3 F 74 18 115/42 94 21











Physical Exam: 


Gen: On propofol. Eyes open but does not follow commands.


Lungs:BS distant. Scattered rhonchi


Extremities:2-3+ edema. No cyanosis





Fluid Balance (Past 24 Hours): 











 11/21/18 11/22/18 11/23/18 11/24/18





 06:59 06:59 06:59 06:59


 


Intake Total 1991.4 1215.8 2717.4 635.1


 


Output Total 1985 5650 3374 1885


 


Balance 6.4 -4434.2 -656.6 -1249.9


 


Weight 511 lb 512 lb  511 lb


 


Intake:    


 


  IV Fluids 476.4 42.3  


 


    ABX - ZOSYN 81.4   


 


    D5W  42.3  


 


       


 


  IVPB 400   


 


    ABX - VANCOMYCIN 300   


 


    ABX - ZOSYN 100   


 


  Medicated  537.5 985.4 515.1


 


    CC - Norepinephrine/ 406 92.5 190.4 95.1





    Levophed    


 


    CC - Propofol/Diprivan 499 445 795 420


 


  Oral 0 0  0


 


  Tube Feeding  511 1131 60


 


  Tube Feeding Flush Amount 160 125 601 


 


  NG Tube Irrigate Amount    60


 


  [place] Irrigate Amount 50   


 


Output:    


 


  Urine  175  


 


  [place] 1985 3875 3374 1885


 


  Residual  1600  


 


    Coude 18 Fr  1600  


 


Other:    


 


  Date of Last Bowel 11/21/18   





  Movement    


 


  # Bowel Movements 1   


 


  Estimated Stool Amount Medium   





 





Labs: 











  11/23/18





  06:00


 


Sodium  151 H


 


Potassium  3.6


 


Chloride  113 H


 


Carbon Dioxide  32


 


Anion Gap  6


 


BUN  85 H


 


Creatinine  3.03 H


 


Est GFR ( Amer)  24.9


 


Est GFR (Non-Af Amer)  20.6


 


BUN/Creatinine Ratio  28.1 H


 


Glucose  119 H


 


Calcium  8.5 L


 


Magnesium  2.9 H











Studies: 


None today





Nutrition: 


As before





Impression: 


Diuresis continues, but still unable to wean.





Plan: 


Same as before. If diuresis does not allow weaning, will proceed with 

tracheostomy.

## 2018-11-24 LAB — INR PPP/BLD: 1.55 (ref 0.77–1.02)

## 2018-11-24 RX ADMIN — PROPOFOL SCH MLS/HR: 10 INJECTION, EMULSION INTRAVENOUS at 18:47

## 2018-11-24 RX ADMIN — CHLORHEXIDINE GLUCONATE 0.12% ORAL RINSE SCH ML: 1.2 LIQUID ORAL at 21:17

## 2018-11-24 RX ADMIN — PROPOFOL SCH MLS/HR: 10 INJECTION, EMULSION INTRAVENOUS at 05:58

## 2018-11-24 RX ADMIN — NYSTATIN SCH APPLIC: 100000 POWDER TOPICAL at 21:17

## 2018-11-24 RX ADMIN — PROPOFOL SCH MLS/HR: 10 INJECTION, EMULSION INTRAVENOUS at 14:29

## 2018-11-24 RX ADMIN — FAMOTIDINE SCH MG: 20 TABLET, FILM COATED ORAL at 09:14

## 2018-11-24 RX ADMIN — NYSTATIN SCH APPLIC: 100000 POWDER TOPICAL at 14:30

## 2018-11-24 RX ADMIN — FUROSEMIDE SCH MG: 10 INJECTION, SOLUTION INTRAMUSCULAR; INTRAVENOUS at 09:14

## 2018-11-24 RX ADMIN — CHLORHEXIDINE GLUCONATE 0.12% ORAL RINSE SCH ML: 1.2 LIQUID ORAL at 09:14

## 2018-11-24 RX ADMIN — NYSTATIN SCH APPLIC: 100000 POWDER TOPICAL at 09:15

## 2018-11-24 RX ADMIN — PROPOFOL SCH MLS/HR: 10 INJECTION, EMULSION INTRAVENOUS at 09:43

## 2018-11-24 RX ADMIN — CHLORHEXIDINE GLUCONATE 0.12% ORAL RINSE SCH ML: 1.2 LIQUID ORAL at 04:48

## 2018-11-24 RX ADMIN — PROPOFOL SCH MLS/HR: 10 INJECTION, EMULSION INTRAVENOUS at 03:15

## 2018-11-24 RX ADMIN — CHLORHEXIDINE GLUCONATE 0.12% ORAL RINSE SCH ML: 1.2 LIQUID ORAL at 17:04

## 2018-11-24 RX ADMIN — NOREPINEPHRINE BITARTRATE SCH: 1 INJECTION INTRAVENOUS at 09:14

## 2018-11-24 RX ADMIN — WARFARIN SODIUM SCH MG: 3 TABLET ORAL at 17:04

## 2018-11-24 RX ADMIN — CHLORHEXIDINE GLUCONATE 0.12% ORAL RINSE SCH ML: 1.2 LIQUID ORAL at 14:29

## 2018-11-24 NOTE — PN
Date of Service: 11/24/18


Critical Care Services: 


Remains on ventilator - Clinical status unchanged





Vital Signs: 











Temp Pulse Resp BP SpO2 FiO2


 


98 F 70 18 95/53 96 25











Physical Exam: 


Gen:On propofol


Lungs:BS distant


Extremities: 2+ edema





Fluid Balance (Past 24 Hours): 


I=     O=     Net 


 Intake & Output











 11/22/18 11/23/18 11/24/18





 06:59 06:59 06:59


 


Intake Total 1215.8 2717.4 1066.1


 


Output Total 5650 3374 2965


 


Balance -4434.2 -656.6 -1898.9


 


Weight 512 lb  509 lb


 


Intake:   


 


  IV Fluids 42.3  


 


    D5W 42.3  


 


  Medicated .5 985.4 946.1


 


    CC - Norepinephrine/ 92.5 190.4 95.1





    Levophed   


 


    CC - Propofol/Diprivan 445 795 851


 


  Oral 0  0


 


  Tube Feeding 511 1131 60


 


  Tube Feeding Flush Amount 125 601 


 


  NG Tube Irrigate Amount   60


 


Output:   


 


  Urine 175  


 


  [place] 3875 3374 2965


 


  Residual 1600  


 


    Coude 18 Fr 1600  





 





Labs: 











  11/24/18 11/24/18





  05:30 05:30


 


INR (Anticoag Therapy)   1.55 H


 


Sodium  150  


 


Potassium  3.5 


 


Chloride  112  


 


Carbon Dioxide  33 H 


 


BUN  84 H 


 


Creatinine  2.98 H 


 


Glucose  121 H 


 


Calcium  8.4 L 











Studies: 


None





Nutrition: 


Tube feedings





Impression: 


No change in clinical status





Plan: 


Trach if unable to wean. Continue aggressive diuresis.

## 2018-11-25 RX ADMIN — CHLORHEXIDINE GLUCONATE 0.12% ORAL RINSE SCH ML: 1.2 LIQUID ORAL at 23:15

## 2018-11-25 RX ADMIN — CHLORHEXIDINE GLUCONATE 0.12% ORAL RINSE SCH ML: 1.2 LIQUID ORAL at 14:03

## 2018-11-25 RX ADMIN — NYSTATIN SCH APPLIC: 100000 POWDER TOPICAL at 09:03

## 2018-11-25 RX ADMIN — FUROSEMIDE SCH MG: 10 INJECTION, SOLUTION INTRAMUSCULAR; INTRAVENOUS at 08:52

## 2018-11-25 RX ADMIN — PROPOFOL SCH MLS/HR: 10 INJECTION, EMULSION INTRAVENOUS at 17:56

## 2018-11-25 RX ADMIN — PROPOFOL SCH MLS/HR: 10 INJECTION, EMULSION INTRAVENOUS at 00:42

## 2018-11-25 RX ADMIN — CHLORHEXIDINE GLUCONATE 0.12% ORAL RINSE SCH ML: 1.2 LIQUID ORAL at 17:15

## 2018-11-25 RX ADMIN — CHLORHEXIDINE GLUCONATE 0.12% ORAL RINSE SCH ML: 1.2 LIQUID ORAL at 20:45

## 2018-11-25 RX ADMIN — FAMOTIDINE SCH MG: 20 TABLET, FILM COATED ORAL at 08:52

## 2018-11-25 RX ADMIN — CHLORHEXIDINE GLUCONATE 0.12% ORAL RINSE SCH ML: 1.2 LIQUID ORAL at 04:10

## 2018-11-25 RX ADMIN — WARFARIN SODIUM SCH MG: 3 TABLET ORAL at 17:15

## 2018-11-25 RX ADMIN — NYSTATIN SCH APPLIC: 100000 POWDER TOPICAL at 14:03

## 2018-11-25 RX ADMIN — NYSTATIN SCH APPLIC: 100000 POWDER TOPICAL at 20:45

## 2018-11-25 RX ADMIN — PROPOFOL SCH MLS/HR: 10 INJECTION, EMULSION INTRAVENOUS at 07:51

## 2018-11-25 RX ADMIN — CHLORHEXIDINE GLUCONATE 0.12% ORAL RINSE SCH ML: 1.2 LIQUID ORAL at 00:42

## 2018-11-25 RX ADMIN — CHLORHEXIDINE GLUCONATE 0.12% ORAL RINSE SCH ML: 1.2 LIQUID ORAL at 08:52

## 2018-11-25 NOTE — PN
Date of Service: 11/25/18


Critical Care Services: 


No change in clinical status - Still uable to wean (tidal volumes less than 100 

ml during spontaneous breathing trial).





Vital Signs: 











Temp Pulse Resp BP SpO2 FiO2


 


97.6 F 69 16 93/51 95 25











Physical Exam: 


Gen:Eyes open but does not follow verbal commands


Lungs:BS distant


Extremities:2-3+edema





Fluid Balance (Past 24 Hours): 











 11/23/18 11/24/18 11/25/18





 06:59 06:59 06:59


 


Intake Total 2717.4 1066.1 3889


 


Output Total 3374 2965 3450


 


Balance -656.6 -1898.9 439


 


Weight  509 lb 500 lb


 


Intake:   


 


  Medicated .4 946.1 408


 


    CC - Norepinephrine/ 190.4 95.1 





    Levophed   


 


    CC - Propofol/Diprivan 795 851 408


 


  Oral  0 


 


  Tube Feeding 1131 60 1831


 


  Tube Feeding Flush Amount 601  1650


 


  NG Tube Irrigate Amount  60 


 


Output:   


 


  [place] 3374 2965 3450





 





Labs: 














  11/25/18





  04:15


 


Sodium  149 H


 


Potassium  3.5


 


Chloride  109


 


Carbon Dioxide  32


 


BUN  85 H


 


Creatinine  2.98 H


 


Glucose  116 H


 


Calcium  8.5 L











Studies: 


None





Nutrition: 


Same as yesterday





Impression: 


Still unable to wean despite attempts at aggressive diuresis.





Plan: 


Will proceed with tracheostomy, although I feel the prognosis here is very 

poor. I have spoken with the brother (the one who is the health care proxy) 

again today about withdrawal of care (considering the poor prognosis), and he 

will discuss with family and get back to me.

## 2018-11-26 VITALS — DIASTOLIC BLOOD PRESSURE: 50 MMHG | SYSTOLIC BLOOD PRESSURE: 93 MMHG

## 2018-11-26 PROCEDURE — 0BP1XDZ REMOVAL OF INTRALUMINAL DEVICE FROM TRACHEA, EXTERNAL APPROACH: ICD-10-PCS | Performed by: INTERNAL MEDICINE

## 2018-11-26 RX ADMIN — FAMOTIDINE SCH MG: 20 TABLET, FILM COATED ORAL at 09:13

## 2018-11-26 RX ADMIN — FUROSEMIDE SCH MG: 10 INJECTION, SOLUTION INTRAMUSCULAR; INTRAVENOUS at 09:13

## 2018-11-26 RX ADMIN — PROPOFOL SCH MLS/HR: 10 INJECTION, EMULSION INTRAVENOUS at 04:00

## 2018-11-26 RX ADMIN — CHLORHEXIDINE GLUCONATE 0.12% ORAL RINSE SCH ML: 1.2 LIQUID ORAL at 09:13

## 2018-11-26 RX ADMIN — NYSTATIN SCH APPLIC: 100000 POWDER TOPICAL at 09:14

## 2018-11-26 RX ADMIN — CHLORHEXIDINE GLUCONATE 0.12% ORAL RINSE SCH ML: 1.2 LIQUID ORAL at 04:25

## 2018-11-27 NOTE — DS
DEATH SUMMARY:

 

DATE OF ADMISSION:  18

 

DATE OF DEATH:  18

 

HOSPITAL COURSE:  This is a 68-year-old male with morbid obesity and multiple 
admissions in the past for hypercapnic respiratory failure, who was admitted on 
, for the same, and was intubated and placed on mechanical ventilation. 
Multiple attempts to remove the patient from the ventilator were unsuccessful 
and there was no reversible cause for ventilator-dependance.  After discussions 
with the family, the patient was placed on "comfort measures only" care and was 
extubated and removed from ventilatory support.  The patient was pronounced 
 at 12:10 on 18.  Family was present at the bedside and autopsy 
was denied.

 

FINAL DISCHARGE DIAGNOSES:

1.  Morbid obesity.

2.  Hypercapnic respiratory failure.

3.  Atrial fibrillation.

4.  Congestive heart failure.

5.  Obstructive sleep apnea.

 

 [id number]/CPS #: [id number]

MTDD